# Patient Record
Sex: FEMALE | Race: WHITE | NOT HISPANIC OR LATINO | Employment: OTHER | URBAN - METROPOLITAN AREA
[De-identification: names, ages, dates, MRNs, and addresses within clinical notes are randomized per-mention and may not be internally consistent; named-entity substitution may affect disease eponyms.]

---

## 2017-01-16 ENCOUNTER — THERAPY VISIT (OUTPATIENT)
Dept: OCCUPATIONAL THERAPY | Facility: CLINIC | Age: 57
End: 2017-01-16
Payer: COMMERCIAL

## 2017-01-16 DIAGNOSIS — S52.591D OTHER CLOSED FRACTURE OF DISTAL END OF RIGHT RADIUS WITH ROUTINE HEALING, SUBSEQUENT ENCOUNTER: ICD-10-CM

## 2017-01-16 DIAGNOSIS — M25.531 RIGHT WRIST PAIN: Primary | ICD-10-CM

## 2017-01-16 DIAGNOSIS — M25.631 WRIST STIFFNESS, RIGHT: ICD-10-CM

## 2017-01-16 PROCEDURE — 97022 WHIRLPOOL THERAPY: CPT | Mod: GO | Performed by: OCCUPATIONAL THERAPIST

## 2017-01-16 PROCEDURE — 97110 THERAPEUTIC EXERCISES: CPT | Mod: GO | Performed by: OCCUPATIONAL THERAPIST

## 2017-01-16 PROCEDURE — 97140 MANUAL THERAPY 1/> REGIONS: CPT | Mod: GO | Performed by: OCCUPATIONAL THERAPIST

## 2017-01-16 PROCEDURE — 97035 APP MDLTY 1+ULTRASOUND EA 15: CPT | Mod: GO | Performed by: OCCUPATIONAL THERAPIST

## 2017-01-16 NOTE — PROGRESS NOTES
SOAP Note - Hand Therapy    Current Date:  1/16/17  Referring Physician: Dr. Nicholas Gonzalez ESTUARDO Mayo is a 56 year old right hand dominant female.    Diagnosis:Right Distal Radius Fracture  DOI:  9/10/16  DOS:  9/27/16  Procedure: Open reduction internal fixation right intraarticular distal radius fracture, greater than 3 parts with the use of corticocancellous bone graft.    Post: 15w 6d    Patient reports symptoms of pain, stiffness/loss of motion, weakness/loss of strength and edema of the right wrist and hand  which occurred due to a fall on gravel and landed on right wrist.     S:  Subjective changes as noted by patient:.  I don't feel the wrist has gotten any more motion.  Functional changes noted by patient: I can do most everything now in my everyday activities.    Upper Extremity Functional Index Score:  SCORE:   Column Totals: /80: 67   (A lower score indicates greater disability.)      O:  Pain:    VAS(0-10) 10/3/16 10/19/16 10/26/16 11/9/16 11/16/16 11/23/16 12/7/16 12/12/16 12/26/16 1/16/17   At Rest:  3/10 0/10 2/10 1/10 1/10 1/10 3/10 2/10 2/10 2/10   With Use:  5/10 3/10 3/10 2/10 2/10 2/10 3/10 2/10 2/10 2/10     Report of Pain:  Location: right wrist and thumb  Description: Ache, soreness  Frequency:  Occasional  Duration:  Worse during the day  Exacerbated by:  activity  Relieved by, ice,  rest  Progression:  Gradually improving    ROM:  Right Left Right Right Right Right Right Right Right Right Right Right   Wrist AROM  (PROM) 10/3/16 10/12/16 10/26/16 11/16/16  post 11/23/16  post 11/31/16 12/7/16  post 12/12/16  post 12/26/16 1/16/17   70 Ext -5 5 30 50 55 35 pre  55 post 55 60 45 pre  60 post 65 post   85 Flex 15 15 20 35 45 30 pre   45 post 40 post  50 post US 47 post  55 post US 40 pre  50 post 50 post  55 post us   25 RD  5  20 20 15 pre  23 post 20  20 pre  25 post 25   50 UD  5  20 25 15 pre  30 post 30  20 pre  35 post 35   90 Sup  30 40 65 70 65 pre   70 post 80 75 70 pre  80 post 80  post   85 Pron  20 40 75 80 70 pre  83 post 85 75 70 pre  83 post 85 post     ROM:  Thumb 10/3/16 10/3/16 10/12/16 12/1/16   AROM(PROM) Right Left Right Right   MP / /     IP / /     RAbd / /     PAbd       Retropulsion       Opposition  [facility] (0-10/10) 7/10 10/10 8/10 9/10     Digits: able to make full fist    Strength:   (Measured in pounds) deferred at this time   16   Trials Right Left Right Left Right Left  Right Left Right   1  2  3  Av  14  14  13 45  48  50  47 16  19  18  18  25  23  20  23  22  23  23  23  28  30  28  29     Lat Pinch 16   Trials Right Left Right Left Right Left  Right Left   1  2  3  Av 13 10  10  10      3 Pt.  Pinch 16   Trials Right Left Right Left Right Left  Right Left   1  2  3  Avg: 3 11 6  6  10      Edema (circumference)   Distal Wrist Crease 10/3/16 10/26/16 12/7/16              Right 17.5 17.3 16.6               Left 15.5          Edema:  []     None   [x]     Mild    []     Moderate      []     Severe                  Location: right wrist     Scar:  []    NA           [x]  Adherent      []   Non-adherent       []   Raised     []   Flattened              Palpation:  []     Normal        [x]   Tender       [x]  Mild         []   Moderate []   Severe     Location: radial and ulnar sides of wrist and on and around incision     Recommendations for Continued Therapy  Additions to Treatment Plan -  Therapeutic Exercise:  Isotonics and Stabilization    Please refer to the daily flowsheet for treatment provided today.     Home Program:   Warmth  AROM of wrist and forearm all planes  Forearm based wrist orthosis full time, remove for exercise and hygiene  Avoid activities that exacerbate pain   Scar mobilization  PROM for wrist E/F, UD/RD, and S/P   strengthening  Wrist PREs     Next Visit:   Heat  US  A/PROM of wrist and fingers  Strengthening of  wrist and hand

## 2017-01-30 ENCOUNTER — THERAPY VISIT (OUTPATIENT)
Dept: OCCUPATIONAL THERAPY | Facility: CLINIC | Age: 57
End: 2017-01-30
Payer: COMMERCIAL

## 2017-01-30 DIAGNOSIS — M25.531 RIGHT WRIST PAIN: Primary | ICD-10-CM

## 2017-01-30 DIAGNOSIS — M25.631 WRIST STIFFNESS, RIGHT: ICD-10-CM

## 2017-01-30 DIAGNOSIS — S52.591D OTHER CLOSED FRACTURE OF DISTAL END OF RIGHT RADIUS WITH ROUTINE HEALING, SUBSEQUENT ENCOUNTER: ICD-10-CM

## 2017-01-30 PROCEDURE — 97022 WHIRLPOOL THERAPY: CPT | Mod: GO | Performed by: OCCUPATIONAL THERAPIST

## 2017-01-30 PROCEDURE — 97035 APP MDLTY 1+ULTRASOUND EA 15: CPT | Mod: GO | Performed by: OCCUPATIONAL THERAPIST

## 2017-01-30 PROCEDURE — 97530 THERAPEUTIC ACTIVITIES: CPT | Mod: GO | Performed by: OCCUPATIONAL THERAPIST

## 2017-01-30 PROCEDURE — 97110 THERAPEUTIC EXERCISES: CPT | Mod: GO | Performed by: OCCUPATIONAL THERAPIST

## 2017-01-30 NOTE — PROGRESS NOTES
SOAP Note - Hand Therapy    Current Date:  1/30/17  Referring Physician: Dr. Nicholas MARTE Gael is a 56 year old right hand dominant female.    Diagnosis:Right Distal Radius Fracture  DOI:  9/10/16  DOS:  9/27/16  Procedure: Open reduction internal fixation right intraarticular distal radius fracture, greater than 3 parts with the use of corticocancellous bone graft.    Post: 17w 6d    Patient reports symptoms of pain, stiffness/loss of motion, weakness/loss of strength and edema of the right wrist and hand  which occurred due to a fall on gravel and landed on right wrist.     S:  Subjective changes as noted by patient:.  The wrist is about the same.  I have trouble reaching behind my back to fasten my bra still  Functional changes noted by patient:  The wrist is getting stronger.    Upper Extremity Functional Index Score:  SCORE:   Column Totals: /80: 67   (A lower score indicates greater disability.)      O:  Pain:    VAS(0-10) 10/3/16 10/19/16 10/26/16 11/9/16 11/16/16 11/23/16 12/7/16 12/12/16 12/26/16 1/16/17 1/30/17   At Rest:  3/10 0/10 2/10 1/10 1/10 1/10 3/10 2/10 2/10 2/10 2/10   With Use:  5/10 3/10 3/10 2/10 2/10 2/10 3/10 2/10 2/10 2/10 2/10     Report of Pain:  Location: right wrist and thumb  Description: Ache, soreness  Frequency:  Occasional  Duration:  Worse during the day  Exacerbated by:  activity  Relieved by, ice,  rest  Progression:  unchanged    ROM:  Right Left Right Right Right Right Right Right Right Right Right Right Right   Wrist AROM  (PROM) 10/3/16 10/12/16 10/26/16 11/16/16  post 11/23/16  post 11/31/16 12/7/16  post 12/12/16  post 12/26/16 1/16/17 1/30/17   70 Ext -5 5 30 50 55 35 pre  55 post 55 60 45 pre  60 post 65 post 58 pre  62   85 Flex 15 15 20 35 45 30 pre   45 post 40 post  50 post US 47 post  55 post US 40 pre  50 post 50 post  55 post us 40 pre  50 post  55 post US     25 RD  5  20 20 15 pre  23 post 20  20 pre  25 post 25 25   50 UD  5  20 25 15 pre  30 post 30   20 pre  35 post 35 30 pre  35 post   90 Sup  30 40 65 70 65 pre   70 post 80 75 70 pre  80 post 80 post 65 pre  80 post   85 Pron  20 40 75 80 70 pre  83 post 85 75 70 pre  83 post 85 post 80 pre     ROM:  Thumb 10/3/16 10/3/16 10/12/16 12/1/16   AROM(PROM) Right Left Right Right   MP / /     IP / /     RAbd / /     PAbd       Retropulsion       Opposition  [facility] Opposition Scale (0-10/10) 7/10 10/10 8/10 9/10     Digits: able to make full fist    Strength:   (Measured in pounds) deferred at this time   16   Trials Right Left Right Left Right Left  Right Left Right Right   1  2  3  Av  14  14  13 45  48  50  47 16  19  18  18  25  23  20  23  22  23  23  23  28  30  28  29 36  34  32  34     Lat Pinch 16   Trials Right Left Right Left Right Left  Right Left   1  2  3  Av 13 10  10  10      3 Pt.  Pinch 16   Trials Right Left Right Left Right Left  Right Left   1  2  3  Avg: 3 11 6  6  10      Edema (circumference)   Distal Wrist Crease 10/3/16 10/26/16 12/7/16              Right 17.5 17.3 16.6               Left 15.5          Edema:  []     None   [x]     Mild    []     Moderate      []     Severe                  Location: right wrist     Scar:  []    NA           [x]  Adherent      []   Non-adherent       []   Raised     []   Flattened              Palpation:  []     Normal        [x]   Tender       [x]  Mild         []   Moderate []   Severe     Location: radial and ulnar sides of wrist and on and around incision     Recommendations for Continued Therapy  Additions to Treatment Plan -  Therapeutic Exercise:  Isotonics and Stabilization    Please refer to the daily flowsheet for treatment provided today.     Home Program:   Warmth  AROM of wrist and forearm all planes  Forearm based wrist orthosis full time, remove for exercise and hygiene  Avoid activities that exacerbate pain   Scar  mobilization  PROM for wrist E/F, UD/RD, and S/P   strengthening  Wrist PREs     Next Visit: 2 weeks  Heat  US  A/PROM of wrist and fingers  Strengthening of wrist and hand

## 2017-03-01 ENCOUNTER — THERAPY VISIT (OUTPATIENT)
Dept: OCCUPATIONAL THERAPY | Facility: CLINIC | Age: 57
End: 2017-03-01
Payer: COMMERCIAL

## 2017-03-01 DIAGNOSIS — M25.631 WRIST STIFFNESS, RIGHT: ICD-10-CM

## 2017-03-01 DIAGNOSIS — M25.531 RIGHT WRIST PAIN: ICD-10-CM

## 2017-03-01 DIAGNOSIS — S52.591D OTHER CLOSED FRACTURE OF DISTAL END OF RIGHT RADIUS WITH ROUTINE HEALING, SUBSEQUENT ENCOUNTER: ICD-10-CM

## 2017-03-01 PROCEDURE — 97530 THERAPEUTIC ACTIVITIES: CPT | Mod: GO | Performed by: OCCUPATIONAL THERAPIST

## 2017-03-01 PROCEDURE — 97140 MANUAL THERAPY 1/> REGIONS: CPT | Mod: GO | Performed by: OCCUPATIONAL THERAPIST

## 2017-03-01 PROCEDURE — 97110 THERAPEUTIC EXERCISES: CPT | Mod: GO | Performed by: OCCUPATIONAL THERAPIST

## 2017-03-01 PROCEDURE — 97035 APP MDLTY 1+ULTRASOUND EA 15: CPT | Mod: GO | Performed by: OCCUPATIONAL THERAPIST

## 2017-03-01 NOTE — MR AVS SNAPSHOT
After Visit Summary   3/1/2017    Carlos Mayo    MRN: [id number]           Patient Information     Date Of Birth          1960        Visit Information        Provider Department      3/1/2017 3:00 PM Holly Wills OT [facility]        Today's Diagnoses     Right wrist pain        Wrist stiffness, right        Other closed fracture of distal end of right radius with routine healing, subsequent encounter           Follow-ups after your visit        Your next 10 appointments already scheduled     Mar 13, 2017  9:15 AM CDT   Return Visit with Sammie Peetrson MD   [facility] ([facility]    [address] [place] [address] [address]  [facility] [address] [phone]            Mar 13, 2017 10:00 AM CDT   CRISTOBAL Hand with Holly Wills OT   [facility] ([facility])    [address] [place], [place] [address]  [facility] [address]  [place] [id number] [phone]              Who to contact     If you have questions or need follow up information about today's clinic visit or your schedule please contact [facility] directly at [phone].  Normal or non-critical lab and imaging results will be communicated to you by [employer], letter or phone within 4 business days after the clinic has received the results. If you do not hear from us within 7 days, please contact the clinic through [employer] or phone. If you have a critical or abnormal lab result, we will notify you by phone as soon as possible.  Submit refill requests through [employer] or call your pharmacy and they will forward the refill request to us. Please allow 3 business days for your refill to be completed.          Additional Information About Your Visit        MyChart Information     [employer] gives you secure access to your electronic health record. If you see a primary care provider, you can also send messages to your care team and make appointments. If you have questions, please call  your primary care clinic.  If you do not have a primary care provider, please call [phone] and they will assist you.        Care EveryWhere ID     This is your Care EveryWhere ID. This could be used by other organizations to access your [facility] medical records  [id number]        Your Vitals Were     Last Period                   11/01/2007            Blood Pressure from Last 3 Encounters:   12/12/16 127/80   10/31/16 131/84   10/03/16 130/78    Weight from Last 3 Encounters:   09/27/16 72.8 kg (160 lb 9.6 oz)   09/26/16 72.8 kg (160 lb 9.6 oz)   09/26/16 71.7 kg (158 lb)              We Performed the Following     MANUAL THER TECH,1+REGIONS,EA 15 MIN     THERAPEUTIC ACTIVITIES     THERAPEUTIC EXERCISES     ULTRASOUND THERAPY        Primary Care Provider Office Phone # Fax #    Mayelin Briggs [phone]       [facility] [address] [place] [address]        Thank you!     Thank you for choosing [facility]  for your care. Our goal is always to provide you with excellent care. Hearing back from our patients is one way we can continue to improve our services. Please take a few minutes to complete the written survey that you may receive in the mail after your visit with us. Thank you!             Your Updated Medication List - Protect others around you: Learn how to safely use, store and throw away your medicines at www.disposemymeds.org.          This list is accurate as of: 3/1/17  6:03 PM.  Always use your most recent med list.                   Brand Name Dispense Instructions for use    acetaminophen 325 MG tablet    TYLENOL    100 tablet    Take 2 tablets (650 mg) by mouth every 6 hours       vitamin B complex with vitamin C Tabs tablet      Take 1 tablet by mouth daily       VITAMIN C PO          vitamin D 2000 UNITS Caps

## 2017-03-13 ENCOUNTER — OFFICE VISIT (OUTPATIENT)
Dept: ORTHOPEDICS | Facility: CLINIC | Age: 57
End: 2017-03-13
Payer: COMMERCIAL

## 2017-03-13 ENCOUNTER — THERAPY VISIT (OUTPATIENT)
Dept: OCCUPATIONAL THERAPY | Facility: CLINIC | Age: 57
End: 2017-03-13
Payer: COMMERCIAL

## 2017-03-13 VITALS — DIASTOLIC BLOOD PRESSURE: 62 MMHG | SYSTOLIC BLOOD PRESSURE: 122 MMHG

## 2017-03-13 DIAGNOSIS — M25.531 RIGHT WRIST PAIN: ICD-10-CM

## 2017-03-13 DIAGNOSIS — S52.571D OTHER CLOSED INTRA-ARTICULAR FRACTURE OF DISTAL END OF RIGHT RADIUS WITH ROUTINE HEALING, SUBSEQUENT ENCOUNTER: Primary | ICD-10-CM

## 2017-03-13 DIAGNOSIS — M25.631 WRIST STIFFNESS, RIGHT: ICD-10-CM

## 2017-03-13 DIAGNOSIS — S52.591D OTHER CLOSED FRACTURE OF DISTAL END OF RIGHT RADIUS WITH ROUTINE HEALING, SUBSEQUENT ENCOUNTER: ICD-10-CM

## 2017-03-13 PROCEDURE — 97140 MANUAL THERAPY 1/> REGIONS: CPT | Mod: GO | Performed by: OCCUPATIONAL THERAPIST

## 2017-03-13 PROCEDURE — 97035 APP MDLTY 1+ULTRASOUND EA 15: CPT | Mod: GO | Performed by: OCCUPATIONAL THERAPIST

## 2017-03-13 PROCEDURE — 99213 OFFICE O/P EST LOW 20 MIN: CPT | Performed by: ORTHOPAEDIC SURGERY

## 2017-03-13 PROCEDURE — 97530 THERAPEUTIC ACTIVITIES: CPT | Mod: GO | Performed by: OCCUPATIONAL THERAPIST

## 2017-03-13 PROCEDURE — 97110 THERAPEUTIC EXERCISES: CPT | Mod: GO | Performed by: OCCUPATIONAL THERAPIST

## 2017-03-13 ASSESSMENT — PAIN SCALES - GENERAL: PAINLEVEL: NO PAIN (1)

## 2017-03-13 NOTE — PROGRESS NOTES
Discharge Note - Hand Therapy    Current Date:  3/13/17  Initial Evaluation: 10/3/17  Reporting Period:  3/1/17 to 3/13/17  Number of visits: 16  Referring Physician: Dr. Nicholas Gonzalez ESTUARDO Mayo is a 56 year old right hand dominant female.    Diagnosis:Right Distal Radius Fracture  DOI:  9/10/16  DOS:  9/27/16  Procedure: Open reduction internal fixation right intraarticular distal radius fracture, greater than 3 parts with the use of corticocancellous bone graft.    Post: 5 months     Patient reports symptoms of pain, stiffness/loss of motion, weakness/loss of strength and edema of the right wrist and hand  which occurred due to a fall on gravel and landed on right wrist.     S:  Subjective changes as noted by patient:  I think I can go on my own now.  What do you think?  Functional changes noted by patient: Independent with most functional tasks.  Still has trouble with reaching and applying seat belt.    Upper Extremity Functional Index Score:  SCORE:   Column Totals: /80: 69   (A lower score indicates greater disability.)    O:  Pain:    VAS(0-10) 10/3/16 11/9/16 11/16/16 12/7/16 12/26/16 1/16/17 1/30/17 3/1/17 3/13/17   At Rest:  3/10 1/10 1/10 3/10 2/10 2/10 2/10 0/10 0/10   With Use:  5/10 2/10 2/10 3/10 2/10 2/10 2/10 1/10 1/10     Report of Pain:  Location: right ulnar side of wrist   Description: Ache, soreness  Frequency:  Occasional  Duration:  Worse during the day  Exacerbated by:  activity  Relieved by, ice,  rest  Progression:  improved    ROM:  Right Left Right Right Right Right Right Right Right Right Right Right Right Right Right   Wrist AROM  (PROM) 10/3/16 10/12/16 10/26/16 11/16/16  post 11/23/16  post 11/31/16 12/7/16  post 12/12/16  post 12/26/16 1/16/17 1/30/17 3/1/17 3/13/17   70 Ext -5 5 30 50 55 35 pre  55 post 55 60 45 pre  60 post 65 post 58 pre  62 58 pre  65 post 60 pre  70 post   85 Flex 15 15 20 35 45 30 pre   45 post 40 post  50 post US 47 post  55 post US 40 pre  50 post 50  pre  55 post us 40 pre  50 post  55 post US   45 pre  60 post 50 pre  60 post   25 RD  5  20 20 15 pre  23 post 20  20 pre  25 post 25 25 25 25 pre   50 UD  5  20 25 15 pre  30 post 30  20 pre  35 post 35 30 pre  35 post 38 pre  40 post 30 pre  35 pre   90 Sup  30 40 65 70 65 pre   70 post 80 75 70 pre  80 post 80 post 65 pre  80 post 78 pre  87 post 70 pre  80 post   85 Pron  20 40 75 80 70 pre  83 post 85 75 70 pre  83 post 85 post 80 pre 85 85 pre     ROM:  Thumb 10/3/16 10/3/16 10/12/16 12/1/16   AROM(PROM) Right Left Right Right   MP / /     IP / /     RAbd / /     PAbd       Retropulsion       Opposition  [facility] Opposition Scale (0-10/10) 7/10 10/10 8/10 9/10     Digits: able to make full fist    Strength:   (Measured in pounds) deferred at this time   11/9/16 11/23/16 12/7/16 12/26/16 1/16/17 1/30/16 3/1/17 3/13/17   Trials Right Left Right Left Right Left  Right Left Right Right Right Right   1  2  3  Av  14  14  13 45  48  50  47 16  19  18  18  25  23  20  23  22  23  23  23  28  30  28  29 36  34  32  34 37  38  40  38 42  40  39  40     Lat Pinch 11/9/16 12/7/16 12/26/16 1/16/17 3/1/17   Trials Right Left Right Left Right Left  Right Left Right   1  2  3  Av 13 10  10  10  10     3 Pt.  Pinch 11/9/16 12/7/16 12/26/16 1/16/17 3/1/17   Trials Right Left Right Left Right Left  Right Left Right   1  2  3  Avg: 3 11 6  6  10  10        Scar:  []    NA           [x]  Adherent      []   Non-adherent       []   Raised     []   Flattened              Palpation:  [x]     Normal        []   Tender       []  Mild         []   Moderate []   Severe         Please refer to the daily flowsheet for treatment provided today.     Assessment:  Overall Assessment:  Patient's symptoms are resolving.  Patient is independent in home exercise program.  Patient is ready to be discharged from therapy and continue their home treatment program.  STG/LTG:  See goal sheet for details and  updates.    Plan:  Frequency/Duration:  Hold chart open for one month, if no contact is received from patient, discharge will occur at that time with this note serving as the discharge summary.    Recommendations for Home Program -   Home Program:   Warmth  AROM of wrist and forearm all planes  Forearm based wrist orthosis full time, remove for exercise and hygiene  Avoid activities that exacerbate pain   Scar mobilization  PROM for wrist E/F, UD/RD, and S/P   strengthening  Wrist PREs

## 2017-03-13 NOTE — PATIENT INSTRUCTIONS
Thanks for coming today.  [facility]  [address] [place] [address]    To schedule future appointments in Ortho Clinic, you may call [phone].    To schedule ordered imaging by your provider:   Call Central Imaging [phone]    To schedule an injection ordered by your provider:  Call [facility] Injection scheduling line: [phone]  [facility] available online at:  [email or web address]    Please call if any further questions or concerns ([phone]).  Clinic hours 8 am to 5 pm.    Return to clinic (call) if symptoms worsen or fail to improve.

## 2017-03-13 NOTE — MR AVS SNAPSHOT
After Visit Summary   3/13/2017    Carlos Mayo    MRN: [id number]           Patient Information     Date Of Birth          1960        Visit Information        Provider Department      3/13/2017 9:15 AM Sammie Peterson MD [facility]        Today's Diagnoses     Other closed intra-articular fracture of distal end of right radius with routine healing, subsequent encounter    -  1      Care Instructions    Thanks for coming today.  Or[facility]  [address] [place] [address]    To schedule future appointments in Ortho Clinic, you may call [phone].    To schedule ordered imaging by your provider:   Call [facility] [phone]    To schedule an injection ordered by your provider:  Call [facility] Injection scheduling line: [phone]  [employer] available online at:  [email or web address]    Please call if any further questions or concerns ([phone]).  Clinic hours 8 am to 5 pm.    Return to clinic (call) if symptoms worsen or fail to improve.          Follow-ups after your visit        Your next 10 appointments already scheduled     Aug 07, 2017  9:00 AM CDT   Return Visit with Sammie Peterson MD   [facility] ([facility])    [address] [place] [address] [phone]              Who to contact     If you have questions or need follow up information about today's clinic visit or your schedule please contact [facility] directly at [phone].  Normal or non-critical lab and imaging results will be communicated to you by [employer], letter or phone within 4 business days after the clinic has received the results. If you do not hear from us within 7 days, please contact the clinic through [employer] or phone. If you have a critical or abnormal lab result, we will notify you by phone as soon as possible.  Submit refill requests through [employer]  or call your pharmacy and they will forward the refill request to us. Please allow 3 business days for your refill to be completed.          Additional Information About Your Visit        [employer] Information     [employer] gives you secure access to your electronic health record. If you see a primary care provider, you can also send messages to your care team and make appointments. If you have questions, please call your primary care clinic.  If you do not have a primary care provider, please call [phone] and they will assist you.      [employer] is an electronic gateway that provides easy, online access to your medical records. With [employer] you can request a clinic appointment, read your test results, renew a prescription or communicate with your care team.     To access your existing account, please contact your [facility] or call [phone] for assistance.        Care EveryWhere ID     This is your Care EveryWhere ID. This could be used by other organizations to access your [facility] medical records  [id number]        Your Vitals Were     Last Period                   11/01/2007            Blood Pressure from Last 3 Encounters:   03/13/17 122/62   12/12/16 127/80   10/31/16 131/84    Weight from Last 3 Encounters:   09/27/16 72.8 kg (160 lb 9.6 oz)   09/26/16 72.8 kg (160 lb 9.6 oz)   09/26/16 71.7 kg (158 lb)               Primary Care Provider Office Phone # Fax #    Mayelin Briggs [phone]       [facility] [address] [place] [address]        Thank you!     Thank you for choosing [facility]  for your care. Our goal is always to provide you with excellent care. Hearing back from our patients is one way we can continue to improve our services. Please take a few minutes to complete the written survey that you may receive in the mail after your visit with us. Thank you!             Your Updated Medication List - Protect  others around you: Learn how to safely use, store and throw away your medicines at www.disposemymeds.org.          This list is accurate as of: 3/13/17 11:59 PM.  Always use your most recent med list.                   Brand Name Dispense Instructions for use    acetaminophen 325 MG tablet    TYLENOL    100 tablet    Take 2 tablets (650 mg) by mouth every 6 hours       vitamin B complex with vitamin C Tabs tablet      Take 1 tablet by mouth daily       VITAMIN C PO          vitamin D 2000 UNITS Caps

## 2017-03-13 NOTE — NURSING NOTE
"Carlos Mayo's goals for this visit include: Recheck right distal radius ORIF, sx: 927/16  She requests these members of her care team be copied on today's visit information: yes    PCP: Mayelin Briggs    Referring Provider:  No referring provider defined for this encounter.    Chief Complaint   Patient presents with     RECHECK     Right ORIF distal radius, sx: 9/27/16       Initial /62  LMP 11/01/2007 Estimated body mass index is 26.73 kg/(m^2) as calculated from the following:    Height as of 9/27/16: 1.651 m (5' 5\").    Weight as of 9/27/16: 72.8 kg (160 lb 9.6 oz).  Medication Reconciliation: complete    "

## 2017-03-13 NOTE — PROGRESS NOTES
"Date of Service: Mar 13, 2017  Procedure: Open reduction internal fixation of right intraarticular distal radius fracture, greater than 3 parts, with the use of corticocancellous bone graft    Date of Procedure: Sep 27, 2016    History of Present Illness: Carlos Mayo is a 56 year old, right handed female now 5.5 months status post the above-stated procedure who presents today for routine follow up. She reports that things are going well overall, but does have persisting stiffness with wrist extension. She characterizes a sensation of something inside of her wrist stopping her from full extension of the right wrist. Additionally, she notes some ulnar-sided \"tugging\" with right wrist supination. We had previously discussed the prospect of removing the implants, but we had elected to hold off on doing so until around 1 year status post ORIF. She reports no new injuries. We had also previously obtained TSH, Ca, and Vitamin D levels that were all within normal limits. We had also discussed the prospect of obtaining a DEXA, but she states that she has not yet done this as she has not yet followed up with her primary care provider.     Physical examination:  Vitals: /62  LMP 11/01/2007  Pain is rated 1 out of 10 on the visual analog scale.  QUICKDASH: 9.09   strength: Level 3  are 45 pounds bilaterally.   GENERAL: Healthy-appearing adult female in no acute distress.  Alert and oriented times three.  Respiratory: Breathing regular and non-labored.  Examination of the right upper extremity reveals 75 degrees of supination (compared to 68 degrees 3 months ago). Full pronation.  She has 65 degrees of wrist flexion. She is able to oppose the right thumb to the middle phalanx of the small finger. Finger abduction, EPL and FPL remain intact. The patient's neurovascular exam is intact as well, and the digits are warm and well perfused with capillary refill in less than 2 seconds.   Skin: Intact without any " rashes or abrasions.    Radiographs: 3 views of the right wrist, dated 12/12/16, were reviewed. These demonstrated unchanged, proper alignment of the dorsal plate and subchondral screws and a well healed fracture.     Assessment: 56 year old, right handed female now 5.5 months status post open reduction and internal fixation of a right intra-articular distal radius fracture; doing very well but with some concern for stiffness and soreness secondary to the dorsal implants.     Plan: Mrs. Mayo and I had another discussion about her reassuring progress since her injury and we again discussed the possibility of removing the implants sometime in September. I reassured her that this is by no means a required procedure, but if she has persisting pain or wrist stiffness, then this would be a reasonable next step. I will plan to see her back in August, at which time we will obtain radiographs and reassess the necessity of removing the dorsal plate and screws. We will also order the DEXA scan for her today as well. She is comfortable with the plan and all of her questions were answered.     I, Sammie Peterson MD, have reviewed the above note and agree with the scribe's notation as written.   I, Medhat Argueta, am serving as a scribe to document services personally performed by Sammie Peterson MD, based upon my observations and the provider's statements to me. All documentation has been reviewed by the aforementioned doctor prior to being entered into the official medical record.

## 2017-03-13 NOTE — MR AVS SNAPSHOT
After Visit Summary   3/13/2017    Carlos Mayo    MRN: [id number]           Patient Information     Date Of Birth          1960        Visit Information        Provider Department      3/13/2017 10:00 AM Holly Wills OT [facility]        Today's Diagnoses     Right wrist pain        Wrist stiffness, right        Other closed fracture of distal end of right radius with routine healing, subsequent encounter           Follow-ups after your visit        Your next 10 appointments already scheduled     Aug 07, 2017  9:00 AM CDT   Return Visit with Sammie Peterson MD   [facility]    [address] [place] [id number] [phone]              Future tests that were ordered for you today     Open Future Orders        Priority Expected Expires Ordered    DX Hip/Pelvis/Spine Routine  3/13/2018 3/13/2017            Who to contact     If you have questions or need follow up information about today's clinic visit or your schedule please contact [facility] directly at [phone].  Normal or non-critical lab and imaging results will be communicated to you by [employer], letter or phone within 4 business days after the clinic has received the results. If you do not hear from us within 7 days, please contact the clinic through [employer] or phone. If you have a critical or abnormal lab result, we will notify you by phone as soon as possible.  Submit refill requests through [employer] or call your pharmacy and they will forward the refill request to us. Please allow 3 business days for your refill to be completed.          Additional Information About Your Visit        [employer] Information     [employer] gives you secure access to your electronic health record. If you see a primary care provider, you can also send messages to your care team and make appointments. If you have questions, please call your primary care clinic.   If you do not have a primary care provider, please call [phone] and they will assist you.        Care EveryWhere ID     This is your Care EveryWhere ID. This could be used by other organizations to access your [facility] medical records  [id number]        Your Vitals Were     Last Period                   11/01/2007            Blood Pressure from Last 3 Encounters:   03/13/17 122/62   12/12/16 127/80   10/31/16 131/84    Weight from Last 3 Encounters:   09/27/16 72.8 kg (160 lb 9.6 oz)   09/26/16 72.8 kg (160 lb 9.6 oz)   09/26/16 71.7 kg (158 lb)              We Performed the Following     MANUAL THER TECH,1+REGIONS,EA 15 MIN     THERAPEUTIC ACTIVITIES     THERAPEUTIC EXERCISES     ULTRASOUND THERAPY        Primary Care Provider Office Phone # Fax #    Mayelin Briggs [phone]       [place] [facility] [address] [place] [address]        Thank you!     Thank you for choosing [facility]  for your care. Our goal is always to provide you with excellent care. Hearing back from our patients is one way we can continue to improve our services. Please take a few minutes to complete the written survey that you may receive in the mail after your visit with us. Thank you!             Your Updated Medication List - Protect others around you: Learn how to safely use, store and throw away your medicines at www.disposemymeds.org.          This list is accurate as of: 3/13/17  5:37 PM.  Always use your most recent med list.                   Brand Name Dispense Instructions for use    acetaminophen 325 MG tablet    TYLENOL    100 tablet    Take 2 tablets (650 mg) by mouth every 6 hours       vitamin B complex with vitamin C Tabs tablet      Take 1 tablet by mouth daily       VITAMIN C PO          vitamin D 2000 UNITS Caps

## 2017-04-24 ENCOUNTER — RADIANT APPOINTMENT (OUTPATIENT)
Dept: BONE DENSITY | Facility: CLINIC | Age: 57
End: 2017-04-24
Attending: ORTHOPAEDIC SURGERY
Payer: COMMERCIAL

## 2017-04-24 DIAGNOSIS — S52.571D OTHER CLOSED INTRA-ARTICULAR FRACTURE OF DISTAL END OF RIGHT RADIUS WITH ROUTINE HEALING, SUBSEQUENT ENCOUNTER: ICD-10-CM

## 2017-04-24 PROCEDURE — 77080 DXA BONE DENSITY AXIAL: CPT | Performed by: RADIOLOGY

## 2017-08-03 DIAGNOSIS — S52.571D OTHER INTRAARTICULAR FRACTURE OF LOWER END OF RIGHT RADIUS, SUBSEQUENT ENCOUNTER FOR CLOSED FRACTURE WITH ROUTINE HEALING: Primary | ICD-10-CM

## 2017-08-07 ENCOUNTER — RADIANT APPOINTMENT (OUTPATIENT)
Dept: GENERAL RADIOLOGY | Facility: CLINIC | Age: 57
End: 2017-08-07
Attending: ORTHOPAEDIC SURGERY
Payer: COMMERCIAL

## 2017-08-07 ENCOUNTER — TELEPHONE (OUTPATIENT)
Dept: ORTHOPEDICS | Facility: CLINIC | Age: 57
End: 2017-08-07

## 2017-08-07 ENCOUNTER — OFFICE VISIT (OUTPATIENT)
Dept: ORTHOPEDICS | Facility: CLINIC | Age: 57
End: 2017-08-07
Payer: COMMERCIAL

## 2017-08-07 VITALS — DIASTOLIC BLOOD PRESSURE: 76 MMHG | SYSTOLIC BLOOD PRESSURE: 114 MMHG | HEART RATE: 66 BPM | OXYGEN SATURATION: 98 %

## 2017-08-07 DIAGNOSIS — S52.571D OTHER INTRAARTICULAR FRACTURE OF LOWER END OF RIGHT RADIUS, SUBSEQUENT ENCOUNTER FOR CLOSED FRACTURE WITH ROUTINE HEALING: ICD-10-CM

## 2017-08-07 DIAGNOSIS — S52.571D OTHER INTRAARTICULAR FRACTURE OF LOWER END OF RIGHT RADIUS, SUBSEQUENT ENCOUNTER FOR CLOSED FRACTURE WITH ROUTINE HEALING: Primary | ICD-10-CM

## 2017-08-07 PROCEDURE — 99213 OFFICE O/P EST LOW 20 MIN: CPT | Performed by: ORTHOPAEDIC SURGERY

## 2017-08-07 PROCEDURE — 73110 X-RAY EXAM OF WRIST: CPT | Mod: RT | Performed by: RADIOLOGY

## 2017-08-07 ASSESSMENT — PAIN SCALES - GENERAL: PAINLEVEL: NO PAIN (0)

## 2017-08-07 NOTE — NURSING NOTE
"Carlos Mayo's goals for this visit include: ORIF right distal sx: 9/27/16  She requests these members of her care team be copied on today's visit information: yes    PCP: Mayelin Briggs    Referring Provider:  No referring provider defined for this encounter.    Chief Complaint   Patient presents with     RECHECK       Initial /76  Pulse 66  LMP 11/01/2007  SpO2 98% Estimated body mass index is 26.73 kg/(m^2) as calculated from the following:    Height as of 9/27/16: 1.651 m (5' 5\").    Weight as of 9/27/16: 72.8 kg (160 lb 9.6 oz).  Medication Reconciliation: complete  "

## 2017-08-07 NOTE — PATIENT INSTRUCTIONS
[facility]  [address] [place] [address]  Phone [phone]  Fax [phone]    SURGICAL INFORMATION & INSTRUCTIONS  Dr. Sammie Peterson  Name of Surgery: Right distal radius removal of hardware  Date of Surgery:   A surgery scheduler will contact you within 1 week of your office visit with the surgeon.  If you don't receive a phone call, please call [phone].   Arrival Time:   Time of Surgery:    Please arrive early so that we can prepare you for surgery.  Please note that scheduled times may change.  A nurse will call you before surgery to confirm your instructions.    Location of Surgery:     ?  [facility]  [address] [place] [address]  2nd floor check-in 5  Phone [phone]  Fax [phone]  [email or web address]    Prior to surgery  ?   Call your insurance company   Ask if you need pre-approval for your surgery.  If pre-approval is needed, please call our surgery scheduler for assistance with the pre-approval process.   If you do not have insurance, please let us know.       ?   Schedule an appointment with a Primary Care Provider for a Pre-Op Physical.  This should be done within 30 days of surgery  If you do not have a primary care provider, you may call [facility] at [phone], for an appointment.  Please have your office note and any labs or tests faxed to the facility where you are having surgery. Please be sure to request a copy of your pre-op physical and bring it with you on the day of surgery.      Tell your provider if you have any of the following:   - IF you have a pacemaker or an ICD (implanted cardiac defibrillator). If you do, please bring the ID card with you on the day of surgery  - IF you're a smoker. People who smoke have a higher risk of infection after surgery. Ask your provider how you can quit smoking.  - If you have diabetes, work with your provider to control your blood sugar. If its not  well-controlled, we may need to delay surgery (or you may have problems healing afterward).  - If your surgeon asks you to see your dentist: You'll need to complete any dental work before surgery. Your dentist must send a letter to your surgery center saying it's ok to do the surgery.  ?   Pre-Op Phone Call  You will receive a pre-op phone call 1-3 days before surgery to review your eating and drinking restrictions, review medications, and confirm surgery times.      ?   7-10 days BEFORE surgery  ? Stop taking aspirin, Plavix or aspirin products 10 days before surgery or as directed by your doctor.    ? Stop taking non-steroidal anti-inflammatory medications (naproxen/Aleve, ibuprofen/Advil/Motrin, celecoxib/Celebrex, meloxicam/Mobic) 1 week before surgery or as directed by your surgeon.  This will reduce the risk of bleeding during surgery.    ? Stop taking fish oil (Omega-3-fatty acid) 1 week before surgery.    ? It is OK to take acetaminophen (Tylenol) up until 2 hours prior to surgery.    ? Take prescription medications as directed by your doctor.  Discuss which medications to take or hold prior to your surgery, with your primary care doctor.      ? If you have diabetes, ask your primary care doctor or endocrinologist how you should take your medication(s).  ?   24 hours BEFORE surgery    ? Stop drinking alcohol (beer, wine, liquor) at least 24-hours before and after your surgery.     ?   Evening BEFORE surgery      You may eat a normal meal the night before surgery, but eat nothing after midnight.       Take a shower - to help wash away bacteria (germs) from your skin.  It s normal to have bacteria on your skin and skin protects us from these germs.  When you have surgery, we cut the skin.  Sometimes germs get into the cuts and cause infection (illness caused by germs).  By following the showering instructions and using the special soap, you will lower the number of germs on your skin.  This decreases your  chance of an infection.    o Do not shave within 12 inches of your incision (surgical cut) area for at least 3 days before surgery.  Shaving can make small cuts in the skin.  This puts you at a higher risk of infection.  Items you will need for each shower:     Newly washed towel    4 ounces of one of the recommended soaps    Follow these instructions, the evening before surgery       Wash your hair and body with your regular shampoo and soap. Make sure you rinse the shampoo and soap from your hair and body.      Using clean hands, apply about 2 ounces of soap gently on your skin from the neck to your toes. Use on your groin area last. Do not use this soap on your face or head. If you get any soap in your eyes, ears or mouth, rinse right away.       Repeat step 2. It is very important to let the soap stay on your skin for at least 1 minute.       Rinse well and dry off using a clean towel.  If you feel any tingling, itching or other irritation, rinse right away. It is normal to feel some coolness on the skin after using the antiseptic soap. Your skin may feel a bit dry after the shower, but do not use any lotions, creams or moisturizers. Do not use hair spray or other products in your hair.      Dress in freshly washed clothes or pajamas. Use fresh pillowcases and sheets on your bed.    ?   Day of Surgery    You may drink clear liquids up to 2 hours before surgery or as directed by your surgeon.  Clear liquids include: Water,  Pedialyte, Gatorade, apple juice and liquids you can read through.  Please avoid liquids that are red or orange in color.    Do NOT drink: milk, coffee, liquids that have pulp, orange juice, and lemonade or tomato juice.     Do NOT chew gum, chew tobacco or suck on hard candy.    Take another shower  Follow these instructions:        Wash your hair and body with your regular shampoo and soap. Make sure you rinse the shampoo and soap from your hair and body.      Using clean hands, apply about  2 ounces of soap gently on your skin from the neck to your toes. Use on your groin area last. Do not use this soap on your face or head. If you get any soap in your eyes, ears or mouth, rinse right away.       Repeat step 2. It is very important to let the soap stay on your skin for at least 1 minute.       Rinse well and dry off using a clean towel.  If you feel any tingling, itching or other irritation, rinse right away. It is normal to feel some coolness on the skin after using the antiseptic soap. Your skin may feel a bit dry after the shower, but do not use any lotions, creams or moisturizers. Do not use hair spray or other products in your hair.      Dress in freshly washed clothes.    Do not wear deodorant, cologne, lotion, makeup, nail polish or jewelry to surgery.  ?   If there is any change in your health PRIOR to your surgery, please contact your surgeon's office  Such as a fever, cold, cough, rash, etc     ? Arrange for someone to drive you home after surgery.    will need to be a responsible adult (18 years or older) that will provide transportation to and from surgery and stay in the waiting room during your surgery. You may not drive yourself or take public transportation to and from surgery.    ?   Arrange for someone to stay with you for 24 hours after you go home.   This person must be a responsible adult (18 years or older).    ?   Bring these items to the hospital/surgery center:   ? Insurance card(s)  ? Money for parking and co-pays, if needed  ? A list of all the medications you take, including vitamins, minerals, herbs and over the counter medications.    ? A copy of your Advance Health Care Directive, if you have one.  This tells us what treatment(s) you would or would not want, and who would make health care decisions, if you could no longer speak for yourself.    ? A case for glasses, contact lenses, hearing aids or dentures.   ? Your inhaler or CPAP machine, if you use these at  home    ?   Leave extra cash, jewelry and other valuables at home.       ?   Other information:     Sleep Apnea: Let your nurse know if you have a history of sleep apnea, only if you are having surgery at the [facility].    When you arrive for surgery  When you get to the surgery center/hospital, you will:    Check in. If you are under age 18, you must be with a parent or legal guardian.      Sign consent forms, if you haven t already. These forms state that you know the risks and benefits of surgery. When you sign the forms, you give us permission to do the surgery. Do not sign them unless you understand what will happen during and after your surgery. If you have any questions about your surgery, ask to speak with your doctor before you sign the forms. If you don t understand the answers, ask again.      Receive a copy of the Patient s Bill of Rights. If you do not receive a copy, please ask for one.      Change into hospital clothes. Your belongings will be placed in a bag. We will return them to you after surgery.      Meet with the anesthesia provider. He or she will tell you what kind of anesthesia (medicine) will be used to keep you comfortable during surgery.      Remember: it s okay to remind doctors and nurses to wash their hands before touching you.      In most cases, your surgeon will use a marker to write his or her initials on the surgery site. This ensures that the exact site is operated on.      For safety reasons, we will ask you the same questions many times. For example, we may ask your name and birth date over and over again.      Friends and family can stay with you until it s time for surgery. While you re in surgery, they will be in the waiting area. Please note that cell phones are not allowed in some patient care areas.      If you have questions about what will happen in the operating room, talk to your care team.      You will meet with an anesthesiologist, before your  surgery.  He or she may reference types of anesthesia commonly used for surgeries:     General:  This involves the use of an IV for injection of medication and anesthesia. You are put into a sleep and have a breathing tube to assist you with breathing.    Sedation:  You are asleep, but not so deply that you need a breathing tube.     Local or Regional: a nerve is injected to numb the surgical area.    Spinal: you are numbed from the waist down with medicine injected into your back.    Femoral Nerve Block:  Anesthesia injected into the groin of leg which you are having surgery on.      After surgery  We will move you to a recovery room, where we will watch you closely. If you have any pain or discomfort, tell your nurse. He or she will try to make you comfortable.      If you are staying overnight, we will move you to your hospital room after you are awake.      If you are going home, we will move you to another room. Friends and family may be able to join you. The length of time you spend in recovery depend on the type of medicine you received, your medical condition, the type of surgery you had, or your response to the anesthesia given during your procedure.      When you are discharged from the recovery room, the nurses will review instructions with you and your caregiver.      Please wash your hands every time you touch the wound or change bandages or dressings.      Do not submerge the wound in water.  You may not use a bathtub or hot tub until the wound is closed. The wait time frame is generally 2-3 weeks, but any open area can be a source of incoming bacteria, so it is better to be on the safe side and avoid water submersion until your wound is fully healed.  Keep all dressings clean and dry.       If you had surgery on your arm or leg, elevate it as much as possible to help reduce swelling.      You may put ice on the surgical area for comfort, keeping ice on area for up to 20 minutes then off for 40  minutes.  You may do this the first few days after surgery to help reduce pain and swelling.        Drink at least 8-10 glasses of liquid each day to help your body heal.      Keep your lungs clear by coughing and taking deep breaths every couple hours.  This is especially important the first 48 hours after surgery.      Notify your doctor if you have any of the following:   o Fever of 101 F or higher  o Numbness and/or tingling  o Increased pain, redness or swelling  o Drainage from wound  o Prolonged or uncontrolled bleeding  o Difficulty with movement    Follow-up Appointments, in Clinic  If you don't already have an appointment scheduled, please call to set up an appointment at [phone].    ?   Nurse Only Appointment, For dressing change and wound check.   ?   Post-Op appointment with provider.     ?   Hand therapy appointment [phone]    Dealing with pain  A nurse will check your comfort level often during your stay. He or she will work with you to manage your pain.  It s expected that you will have pain after surgery.  Our goal is to reduce or minimize pain by:     Remember pain is real. There are many ways to control pain. We can help you decide what works best for you.    Ask for pain medicine when you need it.  Don t try to  tough it out --this can make you feel worse. Always take your medicine as ordered.    Medicine doesn t work the same for everyone. If your medicine isn t working, tell your nurse. There may be other medicines or treatments we can try.    Medication Refills.  If you need refills on your pain medication, please call the clinic as soon as possible.  It may take 72-business hours to obtain a refill.  Refills must be picked up at check-in 2, [facility] or mailed to a pharmacy of your choice.      It is expected that you will wean off the pain medications in a timely manner.     Constipation is a common side effect of pain medication, decreased activity and  anesthesia from surgery.  Take a stool softener as prescribed by your doctor at the time of discharge.  You may also use over the counter medications as needed.  Be sure to increase your fiber (fruits and vegetables) and your water intake.      Please call the clinic at [phone], if you experience any problems or have questions.  If you are having an emergency, always call 911 or seek immediate evaluation at the emergency room.    Thank you for selecting the [facility] for your care!  ---------------------------------------------          Thanks for coming today.  [facility]  [address] [place] [address]    To schedule future appointments in Ortho Clinic, you may call [phone].    To schedule ordered imaging by your provider:   Call [facility] [phone]    To schedule an injection ordered by your provider:  Call [facility] Injection scheduling line: [phone]  [email or web address] available online at:  [email or web address]    Please call if any further questions or concerns ([phone]).  Clinic hours 8 am to 5 pm.    Return to clinic (call) if symptoms worsen or fail to improve.

## 2017-08-07 NOTE — MR AVS SNAPSHOT
After Visit Summary   8/7/2017    Carlos Mayo    MRN: [id number]           Patient Information     Date Of Birth          1960        Visit Information        Provider Department      8/7/2017 9:00 AM Sammie Peterson MD [facility]        Care Instructions      [facility]  [address] [place] [address]  Phone [phone]  Fax [phone]    SURGICAL INFORMATION & INSTRUCTIONS  Dr. Sammie Peterson  Name of Surgery: Right distal radius removal of hardware  Date of Surgery:   A surgery scheduler will contact you within 1 week of your office visit with the surgeon.  If you don't receive a phone call, please call [phone].   Arrival Time:   Time of Surgery:    Please arrive early so that we can prepare you for surgery.  Please note that scheduled times may change.  A nurse will call you before surgery to confirm your instructions.    Location of Surgery:     ?  [address] [address]  2nd floor check-in 5  Phone [phone]  Fax [phone]  [email or web address]    Prior to surgery  ?   Call your insurance company   Ask if you need pre-approval for your surgery.  If pre-approval is needed, please call our surgery scheduler for assistance with the pre-approval process.   If you do not have insurance, please let us know.       ?   Schedule an appointment with a Primary Care Provider for a Pre-Op Physical.  This should be done within 30 days of surgery  If you do not have a primary care provider, you may call [facility] at [phone], for an appointment.  Please have your office note and any labs or tests faxed to the facility where you are having surgery. Please be sure to request a copy of your pre-op physical and bring it with you on the day of surgery.      Tell your provider if you have any of the following:   - IF you have a pacemaker or an ICD (implanted cardiac  defibrillator). If you do, please bring the ID card with you on the day of surgery  - IF you're a smoker. People who smoke have a higher risk of infection after surgery. Ask your provider how you can quit smoking.  - If you have diabetes, work with your provider to control your blood sugar. If its not well-controlled, we may need to delay surgery (or you may have problems healing afterward).  - If your surgeon asks you to see your dentist: You'll need to complete any dental work before surgery. Your dentist must send a letter to your surgery center saying it's ok to do the surgery.  ?   Pre-Op Phone Call  You will receive a pre-op phone call 1-3 days before surgery to review your eating and drinking restrictions, review medications, and confirm surgery times.      ?   7-10 days BEFORE surgery  ? Stop taking aspirin, Plavix or aspirin products 10 days before surgery or as directed by your doctor.    ? Stop taking non-steroidal anti-inflammatory medications (naproxen/Aleve, ibuprofen/Advil/Motrin, celecoxib/Celebrex, meloxicam/Mobic) 1 week before surgery or as directed by your surgeon.  This will reduce the risk of bleeding during surgery.    ? Stop taking fish oil (Omega-3-fatty acid) 1 week before surgery.    ? It is OK to take acetaminophen (Tylenol) up until 2 hours prior to surgery.    ? Take prescription medications as directed by your doctor.  Discuss which medications to take or hold prior to your surgery, with your primary care doctor.      ? If you have diabetes, ask your primary care doctor or endocrinologist how you should take your medication(s).  ?   24 hours BEFORE surgery    ? Stop drinking alcohol (beer, wine, liquor) at least 24-hours before and after your surgery.     ?   Evening BEFORE surgery      You may eat a normal meal the night before surgery, but eat nothing after midnight.       Take a shower - to help wash away bacteria (germs) from your skin.  It s normal to have bacteria on your skin  and skin protects us from these germs.  When you have surgery, we cut the skin.  Sometimes germs get into the cuts and cause infection (illness caused by germs).  By following the showering instructions and using the special soap, you will lower the number of germs on your skin.  This decreases your chance of an infection.    o Do not shave within 12 inches of your incision (surgical cut) area for at least 3 days before surgery.  Shaving can make small cuts in the skin.  This puts you at a higher risk of infection.  Items you will need for each shower:     Newly washed towel    4 ounces of one of the recommended soaps    Follow these instructions, the evening before surgery       Wash your hair and body with your regular shampoo and soap. Make sure you rinse the shampoo and soap from your hair and body.      Using clean hands, apply about 2 ounces of soap gently on your skin from the neck to your toes. Use on your groin area last. Do not use this soap on your face or head. If you get any soap in your eyes, ears or mouth, rinse right away.       Repeat step 2. It is very important to let the soap stay on your skin for at least 1 minute.       Rinse well and dry off using a clean towel.  If you feel any tingling, itching or other irritation, rinse right away. It is normal to feel some coolness on the skin after using the antiseptic soap. Your skin may feel a bit dry after the shower, but do not use any lotions, creams or moisturizers. Do not use hair spray or other products in your hair.      Dress in freshly washed clothes or pajamas. Use fresh pillowcases and sheets on your bed.    ?   Day of Surgery    You may drink clear liquids up to 2 hours before surgery or as directed by your surgeon.  Clear liquids include: Water,  Pedialyte, Gatorade, apple juice and liquids you can read through.  Please avoid liquids that are red or orange in color.    Do NOT drink: milk, coffee, liquids that have pulp, orange juice, and  lemonade or tomato juice.     Do NOT chew gum, chew tobacco or suck on hard candy.    Take another shower  Follow these instructions:        Wash your hair and body with your regular shampoo and soap. Make sure you rinse the shampoo and soap from your hair and body.      Using clean hands, apply about 2 ounces of soap gently on your skin from the neck to your toes. Use on your groin area last. Do not use this soap on your face or head. If you get any soap in your eyes, ears or mouth, rinse right away.       Repeat step 2. It is very important to let the soap stay on your skin for at least 1 minute.       Rinse well and dry off using a clean towel.  If you feel any tingling, itching or other irritation, rinse right away. It is normal to feel some coolness on the skin after using the antiseptic soap. Your skin may feel a bit dry after the shower, but do not use any lotions, creams or moisturizers. Do not use hair spray or other products in your hair.      Dress in freshly washed clothes.    Do not wear deodorant, cologne, lotion, makeup, nail polish or jewelry to surgery.  ?   If there is any change in your health PRIOR to your surgery, please contact your surgeon's office  Such as a fever, cold, cough, rash, etc     ? Arrange for someone to drive you home after surgery.    will need to be a responsible adult (18 years or older) that will provide transportation to and from surgery and stay in the waiting room during your surgery. You may not drive yourself or take public transportation to and from surgery.    ?   Arrange for someone to stay with you for 24 hours after you go home.   This person must be a responsible adult (18 years or older).    ?   Bring these items to the hospital/surgery center:   ? Insurance card(s)  ? Money for parking and co-pays, if needed  ? A list of all the medications you take, including vitamins, minerals, herbs and over the counter medications.    ? A copy of your Advance Health  Care Directive, if you have one.  This tells us what treatment(s) you would or would not want, and who would make health care decisions, if you could no longer speak for yourself.    ? A case for glasses, contact lenses, hearing aids or dentures.   ? Your inhaler or CPAP machine, if you use these at home    ?   Leave extra cash, jewelry and other valuables at home.       ?   Other information:     Sleep Apnea: Let your nurse know if you have a history of sleep apnea, only if you are having surgery at the [facility].    When you arrive for surgery  When you get to the surgery center/hospital, you will:    Check in. If you are under age 18, you must be with a parent or legal guardian.      Sign consent forms, if you haven t already. These forms state that you know the risks and benefits of surgery. When you sign the forms, you give us permission to do the surgery. Do not sign them unless you understand what will happen during and after your surgery. If you have any questions about your surgery, ask to speak with your doctor before you sign the forms. If you don t understand the answers, ask again.      Receive a copy of the Patient s Bill of Rights. If you do not receive a copy, please ask for one.      Change into hospital clothes. Your belongings will be placed in a bag. We will return them to you after surgery.      Meet with the anesthesia provider. He or she will tell you what kind of anesthesia (medicine) will be used to keep you comfortable during surgery.      Remember: it s okay to remind doctors and nurses to wash their hands before touching you.      In most cases, your surgeon will use a marker to write his or her initials on the surgery site. This ensures that the exact site is operated on.      For safety reasons, we will ask you the same questions many times. For example, we may ask your name and birth date over and over again.      Friends and family can stay with you until it s time  for surgery. While you re in surgery, they will be in the waiting area. Please note that cell phones are not allowed in some patient care areas.      If you have questions about what will happen in the operating room, talk to your care team.      You will meet with an anesthesiologist, before your surgery.  He or she may reference types of anesthesia commonly used for surgeries:     General:  This involves the use of an IV for injection of medication and anesthesia. You are put into a sleep and have a breathing tube to assist you with breathing.    Sedation:  You are asleep, but not so deply that you need a breathing tube.     Local or Regional: a nerve is injected to numb the surgical area.    Spinal: you are numbed from the waist down with medicine injected into your back.    Femoral Nerve Block:  Anesthesia injected into the groin of leg which you are having surgery on.      After surgery  We will move you to a recovery room, where we will watch you closely. If you have any pain or discomfort, tell your nurse. He or she will try to make you comfortable.      If you are staying overnight, we will move you to your hospital room after you are awake.      If you are going home, we will move you to another room. Friends and family may be able to join you. The length of time you spend in recovery depend on the type of medicine you received, your medical condition, the type of surgery you had, or your response to the anesthesia given during your procedure.      When you are discharged from the recovery room, the nurses will review instructions with you and your caregiver.      Please wash your hands every time you touch the wound or change bandages or dressings.      Do not submerge the wound in water.  You may not use a bathtub or hot tub until the wound is closed. The wait time frame is generally 2-3 weeks, but any open area can be a source of incoming bacteria, so it is better to be on the safe side and avoid water  submersion until your wound is fully healed.  Keep all dressings clean and dry.       If you had surgery on your arm or leg, elevate it as much as possible to help reduce swelling.      You may put ice on the surgical area for comfort, keeping ice on area for up to 20 minutes then off for 40 minutes.  You may do this the first few days after surgery to help reduce pain and swelling.        Drink at least 8-10 glasses of liquid each day to help your body heal.      Keep your lungs clear by coughing and taking deep breaths every couple hours.  This is especially important the first 48 hours after surgery.      Notify your doctor if you have any of the following:   o Fever of 101 F or higher  o Numbness and/or tingling  o Increased pain, redness or swelling  o Drainage from wound  o Prolonged or uncontrolled bleeding  o Difficulty with movement    Follow-up Appointments, in Clinic  If you don't already have an appointment scheduled, please call to set up an appointment at [phone].    ?   Nurse Only Appointment, For dressing change and wound check.   ?   Post-Op appointment with provider.     ?   Hand therapy appointment [phone]    Dealing with pain  A nurse will check your comfort level often during your stay. He or she will work with you to manage your pain.  It s expected that you will have pain after surgery.  Our goal is to reduce or minimize pain by:     Remember pain is real. There are many ways to control pain. We can help you decide what works best for you.    Ask for pain medicine when you need it.  Don t try to  tough it out --this can make you feel worse. Always take your medicine as ordered.    Medicine doesn t work the same for everyone. If your medicine isn t working, tell your nurse. There may be other medicines or treatments we can try.    Medication Refills.  If you need refills on your pain medication, please call the clinic as soon as possible.  It may take 72-business hours to obtain a  refill.  Refills must be picked up at check-in 2, [facility] or mailed to a pharmacy of your choice.      It is expected that you will wean off the pain medications in a timely manner.     Constipation is a common side effect of pain medication, decreased activity and anesthesia from surgery.  Take a stool softener as prescribed by your doctor at the time of discharge.  You may also use over the counter medications as needed.  Be sure to increase your fiber (fruits and vegetables) and your water intake.      Please call the clinic at [phone], if you experience any problems or have questions.  If you are having an emergency, always call 911 or seek immediate evaluation at the emergency room.    Thank you for selecting the [facility] for your care!  ---------------------------------------------          Thanks for coming today.  Ortho[facility] Medicine Clinic  [address] [place] [address]    To schedule future appointments in [facility], you may call [phone].    To schedule ordered imaging by your provider:   Call Central Imaging [phone]    To schedule an injection ordered by your provider:  Call Central Imaging Injection scheduling line: [phone]  MyChart available online at:  [email or web address]    Please call if any further questions or concerns ([phone]).  Clinic hours 8 am to 5 pm.    Return to clinic (call) if symptoms worsen or fail to improve.            Follow-ups after your visit        Who to contact     If you have questions or need follow up information about today's clinic visit or your schedule please contact [facility] directly at [phone].  Normal or non-critical lab and imaging results will be communicated to you by MyChart, letter or phone within 4 business days after the clinic has received the results. If you do not hear from us within 7 days, please contact the clinic through  [employer] or phone. If you have a critical or abnormal lab result, we will notify you by phone as soon as possible.  Submit refill requests through [employer] or call your pharmacy and they will forward the refill request to us. Please allow 3 business days for your refill to be completed.          Additional Information About Your Visit        [employer] Information     [employer] gives you secure access to your electronic health record. If you see a primary care provider, you can also send messages to your care team and make appointments. If you have questions, please call your primary care clinic.  If you do not have a primary care provider, please call [phone] and they will assist you.      [employer] is an electronic gateway that provides easy, online access to your medical records. With [employer], you can request a clinic appointment, read your test results, renew a prescription or communicate with your care team.     To access your existing account, please contact your [facility] or call [phone] for assistance.        Care EveryWhere ID     This is your Care EveryWhere ID. This could be used by other organizations to access your [facility] medical records  [id number]        Your Vitals Were     Pulse Last Period Pulse Oximetry             66 11/01/2007 98%          Blood Pressure from Last 3 Encounters:   08/07/17 114/76   03/13/17 122/62   12/12/16 127/80    Weight from Last 3 Encounters:   09/27/16 72.8 kg (160 lb 9.6 oz)   09/26/16 72.8 kg (160 lb 9.6 oz)   09/26/16 71.7 kg (158 lb)              Today, you had the following     No orders found for display       Primary Care Provider Office Phone # Fax #    Mayelin Briggs [phone]       [facility] [place] [address] [place] [address]        Equal Access to Services     ANTOINE ANDRADE AH: Michelle Mendoza, renetta blakely, qabeth jeff, prakash ramirez  ah. So [facility] [phone].    ATENCIÓN: Si taiwola francia, tiene a gonzalez disposición servicios gratuitos de asistencia lingüística. Billy al [phone].    We comply with applicable federal civil rights laws and [place] laws. We do not discriminate on the basis of race, color, national origin, age, disability sex, sexual orientation or gender identity.            Thank you!     Thank you for choosing [facility]  for your care. Our goal is always to provide you with excellent care. Hearing back from our patients is one way we can continue to improve our services. Please take a few minutes to complete the written survey that you may receive in the mail after your visit with us. Thank you!             Your Updated Medication List - Protect others around you: Learn how to safely use, store and throw away your medicines at www.disposemymeds.org.          This list is accurate as of: 8/7/17  9:13 AM.  Always use your most recent med list.                   Brand Name Dispense Instructions for use Diagnosis    acetaminophen 325 MG tablet    TYLENOL    100 tablet    Take 2 tablets (650 mg) by mouth every 6 hours    Other closed intra-articular fracture of distal end of right radius with routine healing, subsequent encounter       vitamin B complex with vitamin C Tabs tablet      Take 1 tablet by mouth daily        VITAMIN C PO           vitamin D 2000 UNITS Caps

## 2017-08-07 NOTE — TELEPHONE ENCOUNTER
Date Scheduled: 9-14-17  Facility: [facility]  Surgeon: Dr. Peterson   Post-op appointment scheduled:   Next 5 appointments (look out 90 days)     Sep 25, 2017  8:00 AM CDT   Return Visit with Sammie Peterson MD   [facility] ([facility])    [address] [place] [id number] [phone]                   scheduled?: No  Surgery packet/instructions confirmed received?  Yes  Special Considerations:   Suad Ferrara, Surgery Scheduling Coordinator

## 2017-08-07 NOTE — TELEPHONE ENCOUNTER
Procedure: right distal radius removal of hardware  Facility: [facility]  Length: 90minute(s)  Surgeon: Nicholas RHODES  Anesthesia: Regional  BMI: There is no height or weight on file to calculate BMI. (If over 43 for general or 45 for MAC cannot be scheduled at [facility])   Pre-op Appointments needed: H&P within 30 days  Post-op appointments needed:2 weeks   provider only   needed:  No  Surgery packet/instructions given to patient?  Yes   Special Considerations: none  Maria E Dumont RN

## 2017-08-21 NOTE — PROGRESS NOTES
[facility]  [address] [address]  [phone]  Dept: [phone]    PRE-OP EVALUATION:  Today's date: 2017    Carlos Mayo (: 1960) presents for pre-operative evaluation assessment as requested by Dr. Peterson.  She requires evaluation and anesthesia risk assessment prior to undergoing surgery/procedure for treatment of right wrist .  Proposed procedure: REMOVE HARDWARE WRIST    Date of Surgery/ Procedure: 2017  Time of Surgery/ Procedure: 10:30AM  Hospital/Surgical Facility: [facility]  Primary Physician: Mayelin Briggs  Type of Anesthesia Anticipated: Regional    Patient has a Health Care Directive or Living Will:  NO    1. NO - Do you have a history of heart attack, stroke, stent, bypass or surgery on an artery in the head, neck, heart or legs?  2. NO - Do you ever have any pain or discomfort in your chest?  3. NO - Do you have a history of  Heart Failure?  4. YES - Are you troubled by shortness of breath when: walking on the level, up a slight hill or at night?  5. NO - Do you currently have a cold, bronchitis or other respiratory infection?  6. NO - Do you have a cough, shortness of breath or wheezing?  7. NO - Do you sometimes get pains in the calves of your legs when you walk?  8. YES - Do you or anyone in your family have previous history of blood clots? Father  9. NO - Do you or does anyone in your family have a serious bleeding problem such as prolonged bleeding following surgeries or cuts?  10. NO - Have you ever had problems with anemia or been told to take iron pills?  11. NO - Have you had any abnormal blood loss such as black, tarry or bloody stools, or abnormal vaginal bleeding?  12. NO - Have you ever had a blood transfusion?  13. YES - Have you or any of your relatives ever had problems with anesthesia? Father  14. NO - Do you have sleep apnea, excessive snoring or daytime drowsiness?  15. NO - Do you have any prosthetic  heart valves?  16. NO - Do you have prosthetic joints?  17. NO - Is there any chance that you may be pregnant?        HPI:                                                      Brief HPI related to upcoming procedure: Patient is a history of a right wrist fracture approximately one year ago. Open reduction internal fixation was done. Overall, she has done well, but notes some pain in the right wrist from the surgical hardware placed at the time of the repair. She comes in today for preop evaluation for removal of the surgical hardware.      See problem list for active medical problems.  Problems all longstanding and stable, except as noted/documented.  See ROS for pertinent symptoms related to these conditions.                                                                                                  .    MEDICAL HISTORY:                                                      Patient Active Problem List    Diagnosis Date Noted     Advanced directives, counseling/discussion 05/24/2016     Priority: Medium     Advance Care Planning 5/24/2016: Information given             24 hr info given 03/26/2012     Priority: Medium     EMERGENCY CARE PLAN  Presenting Problem Signs and Symptoms Treatment Plan    Questions or conerns during clinic hours    I will call the clinic directly     Questions or conerns outside clinic hours    I will call the 24 hour nurse line at [phone]    Patient needs to schedule an appointment    I will call the 24 hour scheduling team at [phone] or clinic directly    Same day treatment     I will call the clinic first, nurse line if after hours, urgent care and express care if needed                                 CARDIOVASCULAR SCREENING; LDL GOAL LESS THAN 160 10/31/2010     Priority: Medium     Cervical stenosis (uterine cervix) 09/09/2009     Priority: Medium     PALPITATIONS - rare 08/16/2001     Priority: Medium      In 2001, 2005 had palpitations w/u negative with stress test,  event monitor, holter, labs all normal.    Thought due to anxiety.          Past Medical History:   Diagnosis Date     Esophageal reflux     Resolved     Palpitations     Holter monitor cleared per patient.      Unspecified acute reaction to stress      Past Surgical History:   Procedure Laterality Date     OPEN REDUCTION INTERNAL FIXATION WRIST Right 9/27/2016    Procedure: OPEN REDUCTION INTERNAL FIXATION WRIST;  Surgeon: Sammie Peterson MD;  Location:  OR     SURGICAL HISTORY OF -   1980    Extraction of [place] teeth     Current Outpatient Prescriptions   Medication Sig Dispense Refill     acetaminophen (TYLENOL) 325 MG tablet Take 2 tablets (650 mg) by mouth every 6 hours 100 tablet 0     Cholecalciferol (VITAMIN D) 2000 UNITS CAPS        Ascorbic Acid (VITAMIN C PO)        vitamin  B complex with vitamin C (VITAMIN  B COMPLEX) TABS Take 1 tablet by mouth daily       OTC products: None, except as noted above    No Known Allergies   Latex Allergy: NO    Social History   Substance Use Topics     Smoking status: Never Smoker     Smokeless tobacco: Not on file     Alcohol use 1.0 oz/week      Comment: 2-3 times a month     History   Drug Use No       REVIEW OF SYSTEMS:                                                    Constitutional, HEENT, cardiovascular, pulmonary, gi and gu systems are negative, except as otherwise noted.      EXAM:                                                    [facility] 11/01/2007    GENERAL APPEARANCE: healthy, alert and no distress     EYES: EOMI, PERRL     HENT: ear canals and TM's normal and nose and mouth without ulcers or lesions     RESP: lungs clear to auscultation - no rales, rhonchi or wheezes     CV: regular rates and rhythm     ABDOMEN:  soft, nontender     SKIN: no suspicious lesions or rashes     NEURO: Normal strength and tone, sensory exam grossly normal, mentation intact and speech normal     PSYCH: mentation appears normal. and affect normal/bright     LYMPHATICS: No  axillary, cervical, or supraclavicular nodes    DIAGNOSTICS:                                                    EKG: Sinus rhythm, incomplete right bundle branch. No acute ischemic changes noted.    Recent Labs   Lab Test  02/09/15   0802  11/22/11   0830  06/23/09   0913   HGB   --   13.2  13.0   NA  139   --   143   POTASSIUM  4.5   --   5.0   CR  0.78   --   0.76      Results for orders placed or performed in visit on 08/28/17   Basic metabolic panel  (Ca, Cl, CO2, Creat, Gluc, K, Na, BUN)   Result Value Ref Range    Sodium 137 133 - 144 mmol/L    Potassium 3.8 3.4 - 5.3 mmol/L    Chloride 103 94 - 109 mmol/L    Carbon Dioxide 28 20 - 32 mmol/L    Anion Gap 6 3 - 14 mmol/L    Glucose 110 (H) 70 - 99 mg/dL    Urea Nitrogen 12 7 - 30 mg/dL    Creatinine 0.72 0.52 - 1.04 mg/dL    GFR Estimate 83 >60 mL/min/1.7m2    GFR Estimate If Black >90 >60 mL/min/1.7m2    Calcium 9.2 8.5 - 10.1 mg/dL   CBC with platelets   Result Value Ref Range    WBC 10.2 4.0 - 11.0 10e9/L    RBC Count 4.30 3.8 - 5.2 10e12/L    Hemoglobin 12.6 11.7 - 15.7 g/dL    Hematocrit 40.5 35.0 - 47.0 %    MCV 94 78 - 100 fl    MCH 29.3 26.5 - 33.0 pg    MCHC 31.1 (L) 31.5 - 36.5 g/dL    RDW 13.4 10.0 - 15.0 %    Platelet Count 243 150 - 450 10e9/L      IMPRESSION:                                                        The proposed surgical procedure is considered INTERMEDIATE risk.    REVISED CARDIAC RISK INDEX  The patient has the following serious cardiovascular risks for perioperative complications such as (MI, PE, VFib and 3  AV Block):  No serious cardiac risks  INTERPRETATION: 1 risks: Class II (low risk - 0.9% complication rate)    The patient has the following additional risks for perioperative complications:  No identified additional risks    (Z01.818) Preop general physical exam  (primary encounter diagnosis)  Plan: EKG 12-lead complete w/read - Clinics, Basic         metabolic panel  (Ca, Cl, CO2, Creat, Gluc, K,         Na, BUN), CBC with  platelets      (Z00.00) Routine general medical examination at a [facility] facility      (S62.101S) Fracture of wrist, right, sequela          RECOMMENDATIONS:                                                        Advised the patient to discontinue supplements prior to surgery.    The incomplete right bundle branch seen on EKG should not impact the surgery.    Anticoagulant or Antiplatelet Medication Use  NSAIDS: Ibuprofen (Motrin):         Stop one day prior to surgery      APPROVAL GIVEN to proceed with proposed procedure, without further diagnostic evaluation       Signed Electronically by: Mayelin Briggs MD    Copy of this evaluation report is provided to requesting physician.    [facility] Preop Guidelines

## 2017-08-21 NOTE — PROGRESS NOTES
SUBJECTIVE:   CC: Carlos Mayo is an 56 year old woman who presents for preventive health visit.     Healthy Habits:    Do you get at least three servings of calcium containing foods daily (dairy, green leafy vegetables, etc.)? yes    Amount of exercise or daily activities, outside of work: 2-3 day(s) per week    Problems taking medications regularly No    Medication side effects: No    Have you had an eye exam in the past two years? yes    Do you see a dentist twice per year? yes    Do you have sleep apnea, excessive snoring or daytime drowsiness?yes, drowsiness    - Ongoing right shoulder pain since fall last year.    - FMHx of stroke - grandfather early 60'  s; CHF grandfather in 80's    Today's PHQ-2 Score:   PHQ-2 ( 1999 [employer]) 5/24/2016 2/12/2015   Q1: Little interest or pleasure in doing things 0 0   Q2: Feeling down, depressed or hopeless 0 0   PHQ-2 Score 0 0   Q1: Little interest or pleasure in doing things - -   Q2: Feeling down, depressed or hopeless - -   PHQ-2 Score - -         Abuse: Current or Past(Physical, Sexual or Emotional)- No  Do you feel safe in your environment - Yes  Social History   Substance Use Topics     Smoking status: Never Smoker     Smokeless tobacco: Not on file     Alcohol use 1.0 oz/week      Comment: 2-3 times a month     The patient does not drink >3 drinks per day nor >7 drinks per week.    Reviewed orders with patient.  Reviewed health maintenance and updated orders accordingly - Yes      Patient over age 50, mutual decision to screen reflected in health maintenance.      Pertinent mammograms are reviewed under the imaging tab.  History of abnormal Pap smear:   NO - age 30- 65 PAP every 3 years recommended  Last 3 Pap Results:   PAP (no units)   Date Value   02/12/2015 NIL   11/22/2011 NIL   10/04/2010 ASC-US (A)       Reviewed and updated as needed this visit by clinical staff         Reviewed and updated as needed this visit by Provider          ROS:  C: NEGATIVE  "for fever, chills, change in weight  I: NEGATIVE for worrisome rashes, moles or lesions  E: NEGATIVE for vision changes or irritation  ENT: NEGATIVE for ear, mouth and throat problems  R: NEGATIVE for significant cough or SOB  B: NEGATIVE for masses, tenderness or discharge  CV: NEGATIVE for chest pain, palpitations or peripheral edema  GI: NEGATIVE for nausea, abdominal pain, heartburn, or change in bowel habits  : NEGATIVE for unusual urinary or vaginal symptoms. No vaginal bleeding.  M: POSITIVE for right shoulder pain. NEGATIVE for other significant arthralgias or myalgia  N: NEGATIVE for weakness, dizziness or paresthesias  P: NEGATIVE for changes in mood or affect     This document serves as a record of the services and decisions personally performed and made by Mayelin Briggs MD. It was created on his behalf by Franco Shah, a trained medical scribe. The creation of this document is based the provider's statements to the medical scribe.  Franco Shah 4:29 PM August 28, 2017  OBJECTIVE:   /64  Pulse 62  Ht 5' 5\" (1.651 m)  Wt 160 lb 2 oz (72.6 kg)  LMP 11/01/2007  BMI 26.65 kg/m2       EXAM:  GENERAL APPEARANCE: healthy, alert and no distress  EYES: Eyes grossly normal to inspection, conjunctivae and sclerae normal  HENT: ear canals and TM's normal, nose and mouth without ulcers or lesions  RESP: lungs clear to auscultation - no rales, rhonchi or wheezes  CV: regular rate and rhythm, normal S1 S2, no murmur  MS: no musculoskeletal defects are noted and gait is age appropriate without ataxia  SKIN: no suspicious lesions or rashes  NEURO: Normal strength and tone, sensory exam grossly normal, mentation intact and speech normal  PSYCH: mentation appears normal and affect normal/bright      ASSESSMENT/PLAN:   (Z00.00) Routine general medical examination at a health care facility  (primary encounter diagnosis)  Comment: Reviewed lifestyle, current conditions, medications, routine screenings, " "labs.  Plan: Annual physical in 1 year, sooner for other health maintenance.     (Z01.818) Preop general physical exam  Comment: EKG was normal. Renal function and CBC results pending.   Plan: EKG 12-lead complete w/read - Clinics, Basic         metabolic panel  (Ca, Cl, CO2, Creat, Gluc, K,         Na, BUN), CBC with platelets          Patient Instructions     Preventive Health Recommendations  Female Ages 50 - 64        *   Call about setting up a mammogram: [phone].       COUNSELING:   Reviewed preventive health counseling, as reflected in patient instructions       Regular exercise       Healthy diet/nutrition         reports that she has never smoked. She does not have any smokeless tobacco history on file.    Estimated body mass index is 26.73 kg/(m^2) as calculated from the following:    Height as of 9/27/16: 5' 5\" (1.651 m).    Weight as of 9/27/16: 160 lb 9.6 oz (72.8 kg).     Patient will follow up in 12 months or sooner, PRN. Patient instructed to call with any questions or concerns.      Counseling Resources:  ATP IV Guidelines  Pooled Cohorts Equation Calculator  Breast Cancer Risk Calculator  FRAX Risk Assessment  ICSI Preventive Guidelines  Dietary Guidelines for Americans, 2010  USDA's MyPlate  ASA Prophylaxis  Lung CA Screening  The information in this document, created by a scribe for me, accurately reflects the services I personally performed and the decisions made by me. I have reviewed and approved this document for accuracy. 4:29 PM 8/28/2017    Mayelin Briggs MD  [facility]      "

## 2017-08-21 NOTE — PATIENT INSTRUCTIONS
Preventive Health Recommendations  Female Ages 50 - 64        *   Call about setting up a mammogram: [phone].       Yearly exam: See your health care provider every year in order to  o Review health changes.   o Discuss preventive care.    o Review your medicines if your doctor has prescribed any.      Get a Pap test every three years (unless you have an abnormal result and your provider advises testing more often).    If you get Pap tests with HPV test, you only need to test every 5 years, unless you have an abnormal result.     You do not need a Pap test if your uterus was removed (hysterectomy) and you have not had cancer.    You should be tested each year for STDs (sexually transmitted diseases) if you're at risk.     Have a mammogram every 1 to 2 years.    Have a colonoscopy at age 50, or have a yearly FIT test (stool test). These exams screen for colon cancer.      Have a cholesterol test every 5 years, or more often if advised.    Have a diabetes test (fasting glucose) every three years. If you are at risk for diabetes, you should have this test more often.     If you are at risk for osteoporosis (brittle bone disease), think about having a bone density scan (DEXA).    Shots: Get a flu shot each year. Get a tetanus shot every 10 years.    Nutrition:     Eat at least 5 servings of fruits and vegetables each day.    Eat whole-grain bread, whole-wheat pasta and brown rice instead of white grains and rice.    Talk to your provider about Calcium and Vitamin D.     Lifestyle    Exercise at least 150 minutes a week (30 minutes a day, 5 days a week). This will help you control your weight and prevent disease.    Limit alcohol to one drink per day.    No smoking.     Wear sunscreen to prevent skin cancer.     See your dentist every six months for an exam and cleaning.    See your eye doctor every 1 to 2 years.    Before Your Surgery      Call your surgeon if there is any change in your health. This includes  signs of a cold or flu (such as a sore throat, runny nose, cough, rash or fever).    Do not smoke, drink alcohol or take over the counter medicine (unless your surgeon or primary care doctor tells you to) for the 24 hours before and after surgery.    If you take prescribed drugs: Follow your doctor s orders about which medicines to take and which to stop until after surgery.    Eating and drinking prior to surgery: follow the instructions from your surgeon    Take a shower or bath the night before surgery. Use the soap your surgeon gave you to gently clean your skin. If you do not have soap from your surgeon, use your regular soap. Do not shave or scrub the surgery site.  Wear clean pajamas and have clean sheets on your bed.

## 2017-08-28 ENCOUNTER — OFFICE VISIT (OUTPATIENT)
Dept: FAMILY MEDICINE | Facility: CLINIC | Age: 57
End: 2017-08-28
Payer: COMMERCIAL

## 2017-08-28 VITALS
BODY MASS INDEX: 26.68 KG/M2 | HEIGHT: 65 IN | HEART RATE: 62 BPM | DIASTOLIC BLOOD PRESSURE: 64 MMHG | SYSTOLIC BLOOD PRESSURE: 104 MMHG | WEIGHT: 160.13 LBS

## 2017-08-28 DIAGNOSIS — S62.101S FRACTURE OF WRIST, RIGHT, SEQUELA: ICD-10-CM

## 2017-08-28 DIAGNOSIS — Z00.00 ROUTINE GENERAL MEDICAL EXAMINATION AT A HEALTH CARE FACILITY: ICD-10-CM

## 2017-08-28 DIAGNOSIS — Z01.818 PREOP GENERAL PHYSICAL EXAM: Primary | ICD-10-CM

## 2017-08-28 LAB
ERYTHROCYTE [DISTWIDTH] IN BLOOD BY AUTOMATED COUNT: 13.4 % (ref 10–15)
HCT VFR BLD AUTO: 40.5 % (ref 35–47)
HGB BLD-MCNC: 12.6 G/DL (ref 11.7–15.7)
MCH RBC QN AUTO: 29.3 PG (ref 26.5–33)
MCHC RBC AUTO-ENTMCNC: 31.1 G/DL (ref 31.5–36.5)
MCV RBC AUTO: 94 FL (ref 78–100)
PLATELET # BLD AUTO: 243 10E9/L (ref 150–450)
RBC # BLD AUTO: 4.3 10E12/L (ref 3.8–5.2)
WBC # BLD AUTO: 10.2 10E9/L (ref 4–11)

## 2017-08-28 PROCEDURE — 80048 BASIC METABOLIC PNL TOTAL CA: CPT | Performed by: FAMILY MEDICINE

## 2017-08-28 PROCEDURE — 93000 ELECTROCARDIOGRAM COMPLETE: CPT | Performed by: FAMILY MEDICINE

## 2017-08-28 PROCEDURE — 36415 COLL VENOUS BLD VENIPUNCTURE: CPT | Performed by: FAMILY MEDICINE

## 2017-08-28 PROCEDURE — 99213 OFFICE O/P EST LOW 20 MIN: CPT | Mod: 25 | Performed by: FAMILY MEDICINE

## 2017-08-28 PROCEDURE — 99396 PREV VISIT EST AGE 40-64: CPT | Performed by: FAMILY MEDICINE

## 2017-08-28 PROCEDURE — 85027 COMPLETE CBC AUTOMATED: CPT | Performed by: FAMILY MEDICINE

## 2017-08-28 NOTE — NURSING NOTE
"Chief Complaint   Patient presents with     Physical     Pre-Op Exam       Initial /64  Pulse 62  Ht 5' 5\" (1.651 m)  Wt 160 lb 2 oz (72.6 kg)  LMP 11/01/2007  BMI 26.65 kg/m2 Estimated body mass index is 26.65 kg/(m^2) as calculated from the following:    Height as of this encounter: 5' 5\" (1.651 m).    Weight as of this encounter: 160 lb 2 oz (72.6 kg).  Medication Reconciliation: complete  "

## 2017-08-28 NOTE — MR AVS SNAPSHOT
After Visit Summary   8/28/2017    Carlos Mayo    MRN: [id number]           Patient Information     Date Of Birth          1960        Visit Information        Provider Department      8/28/2017 4:20 PM Mayelin Briggs MD [facility]        Today's Diagnoses     Routine general medical examination at a health care facility    -  1    Preop general physical exam          Care Instructions      Preventive Health Recommendations  Female Ages 50 - 64        *   Call about setting up a mammogram: [phone].       Yearly exam: See your health care provider every year in order to  o Review health changes.   o Discuss preventive care.    o Review your medicines if your doctor has prescribed any.      Get a Pap test every three years (unless you have an abnormal result and your provider advises testing more often).    If you get Pap tests with HPV test, you only need to test every 5 years, unless you have an abnormal result.     You do not need a Pap test if your uterus was removed (hysterectomy) and you have not had cancer.    You should be tested each year for STDs (sexually transmitted diseases) if you're at risk.     Have a mammogram every 1 to 2 years.    Have a colonoscopy at age 50, or have a yearly FIT test (stool test). These exams screen for colon cancer.      Have a cholesterol test every 5 years, or more often if advised.    Have a diabetes test (fasting glucose) every three years. If you are at risk for diabetes, you should have this test more often.     If you are at risk for osteoporosis (brittle bone disease), think about having a bone density scan (DEXA).    Shots: Get a flu shot each year. Get a tetanus shot every 10 years.    Nutrition:     Eat at least 5 servings of fruits and vegetables each day.    Eat whole-grain bread, whole-wheat pasta and brown rice instead of white grains and rice.    Talk to your provider about Calcium and Vitamin D.      Lifestyle    Exercise at least 150 minutes a week (30 minutes a day, 5 days a week). This will help you control your weight and prevent disease.    Limit alcohol to one drink per day.    No smoking.     Wear sunscreen to prevent skin cancer.     See your dentist every six months for an exam and cleaning.    See your eye doctor every 1 to 2 years.    Before Your Surgery      Call your surgeon if there is any change in your health. This includes signs of a cold or flu (such as a sore throat, runny nose, cough, rash or fever).    Do not smoke, drink alcohol or take over the counter medicine (unless your surgeon or primary care doctor tells you to) for the 24 hours before and after surgery.    If you take prescribed drugs: Follow your doctor s orders about which medicines to take and which to stop until after surgery.    Eating and drinking prior to surgery: follow the instructions from your surgeon    Take a shower or bath the night before surgery. Use the soap your surgeon gave you to gently clean your skin. If you do not have soap from your surgeon, use your regular soap. Do not shave or scrub the surgery site.  Wear clean pajamas and have clean sheets on your bed.           Follow-ups after your visit        Your next 10 appointments already scheduled     Sep 14, 2017   Procedure with Sammie Peterson MD   [facility] (--)    [id number] [place] [id number]   [phone]            Sep 14, 2017 10:30 AM CDT   XR SURGERY LIBRADO FLUORO L/T 5 MIN with [email or web address]   [facility]    [address] [place] [address]   [phone]           Please bring a list of your current medicines to your exam. (Include vitamins, minerals and over-thecounter medicines.) Leave your valuables at home.  Tell your doctor if there is a chance you may be pregnant.  You do not need to do anything special for this exam.            Sep 25, 2017  8:00  "AM CDT   Return Visit with Sammie Peterson MD   [facility] ([facility])    [address] [address] [place] [address] [phone]              Who to contact     Normal or non-critical lab and imaging results will be communicated to you by eSecure Systemshart, letter or phone within 4 business days after the clinic has received the results. If you do not hear from us within 7 days, please contact the clinic through eSecure Systemshart or phone. If you have a critical or abnormal lab result, we will notify you by phone as soon as possible.  Submit refill requests through [employer] (formerly [employer]) or call your pharmacy and they will forward the refill request to us. Please allow 3 business days for your refill to be completed.          If you need to speak with a  for additional information , please call: [phone]           Additional Information About Your Visit        [employer] (formerly [employer]) Information     [employer] ([employer]) gives you secure access to your electronic health record. If you see a primary care provider, you can also send messages to your care team and make appointments. If you have questions, please call your primary care clinic.  If you do not have a primary care provider, please call [phone] and they will assist you.        Care EveryWhere ID     This is your Care EveryWhere ID. This could be used by other organizations to access your [facility] medical records  [id number]        Your Vitals Were     Pulse Height Last Period BMI (Body Mass Index)          62 5' 5\" (1.651 m) 11/01/2007 26.65 kg/m2         Blood Pressure from Last 3 Encounters:   08/28/17 104/64   08/07/17 114/76   03/13/17 122/62    Weight from Last 3 Encounters:   08/28/17 160 lb 2 oz (72.6 kg)   09/27/16 160 lb 9.6 oz (72.8 kg)   09/26/16 160 lb 9.6 oz (72.8 kg)              We Performed the Following     Basic metabolic panel  (Ca, Cl, CO2, Creat, Gluc, K, Na, BUN)     CBC with platelets     EKG 12-lead complete w/read - " [facility]        Primary Care Provider Office Phone # Fax #    Mayelin Briggs [phone] [address] [place] [address]        Equal Access to Services     ANTOINE ANDRADE : Hadii jag martini michaelo Kelly, waaxda luqadaha, qaybta kaalmada adezhouyada, prakash quiroz laKayadelvin brady. So [facility] [phone].    ATENCIÓN: Si habla español, tiene a gonzalez disposición servicios gratuitos de asistencia lingüística. Llame al [phone].    We comply with applicable federal civil rights laws and [place] laws. We do not discriminate on the basis of race, color, national origin, age, disability sex, sexual orientation or gender identity.            Thank you!     Thank you for choosing [facility]  for your care. Our goal is always to provide you with excellent care. Hearing back from our patients is one way we can continue to improve our services. Please take a few minutes to complete the written survey that you may receive in the mail after your visit with us. Thank you!             Your Updated Medication List - Protect others around you: Learn how to safely use, store and throw away your medicines at www.disposemymeds.org.          This list is accurate as of: 8/28/17  4:38 PM.  Always use your most recent med list.                   Brand Name Dispense Instructions for use Diagnosis    acetaminophen 325 MG tablet    TYLENOL    100 tablet    Take 2 tablets (650 mg) by mouth every 6 hours    Other closed intra-articular fracture of distal end of right radius with routine healing, subsequent encounter       vitamin B complex with vitamin C Tabs tablet      Take 1 tablet by mouth daily        VITAMIN C PO           vitamin D 2000 UNITS Caps

## 2017-08-29 LAB
ANION GAP SERPL CALCULATED.3IONS-SCNC: 6 MMOL/L (ref 3–14)
BUN SERPL-MCNC: 12 MG/DL (ref 7–30)
CALCIUM SERPL-MCNC: 9.2 MG/DL (ref 8.5–10.1)
CHLORIDE SERPL-SCNC: 103 MMOL/L (ref 94–109)
CO2 SERPL-SCNC: 28 MMOL/L (ref 20–32)
CREAT SERPL-MCNC: 0.72 MG/DL (ref 0.52–1.04)
GFR SERPL CREATININE-BSD FRML MDRD: 83 ML/MIN/1.7M2
GLUCOSE SERPL-MCNC: 110 MG/DL (ref 70–99)
POTASSIUM SERPL-SCNC: 3.8 MMOL/L (ref 3.4–5.3)
SODIUM SERPL-SCNC: 137 MMOL/L (ref 133–144)

## 2017-09-13 ENCOUNTER — ANESTHESIA EVENT (OUTPATIENT)
Dept: SURGERY | Facility: AMBULATORY SURGERY CENTER | Age: 57
End: 2017-09-13

## 2017-09-14 ENCOUNTER — HOSPITAL ENCOUNTER (OUTPATIENT)
Facility: AMBULATORY SURGERY CENTER | Age: 57
Discharge: HOME | End: 2017-09-14
Attending: ORTHOPAEDIC SURGERY | Admitting: ORTHOPAEDIC SURGERY
Payer: COMMERCIAL

## 2017-09-14 ENCOUNTER — ANESTHESIA (OUTPATIENT)
Dept: SURGERY | Facility: AMBULATORY SURGERY CENTER | Age: 57
End: 2017-09-14

## 2017-09-14 VITALS
DIASTOLIC BLOOD PRESSURE: 68 MMHG | OXYGEN SATURATION: 99 % | RESPIRATION RATE: 16 BRPM | SYSTOLIC BLOOD PRESSURE: 109 MMHG | TEMPERATURE: 98.3 F

## 2017-09-14 DIAGNOSIS — S52.571D OTHER CLOSED INTRA-ARTICULAR FRACTURE OF DISTAL END OF RIGHT RADIUS WITH ROUTINE HEALING, SUBSEQUENT ENCOUNTER: Primary | ICD-10-CM

## 2017-09-14 PROCEDURE — G8907 PT DOC NO EVENTS ON DISCHARG: HCPCS

## 2017-09-14 PROCEDURE — 20680 REMOVAL OF IMPLANT DEEP: CPT

## 2017-09-14 PROCEDURE — G8918 PT W/O PREOP ORDER IV AB PRO: HCPCS

## 2017-09-14 PROCEDURE — 20680 REMOVAL OF IMPLANT DEEP: CPT | Mod: RT | Performed by: ORTHOPAEDIC SURGERY

## 2017-09-14 RX ORDER — ALBUTEROL SULFATE 0.83 MG/ML
2.5 SOLUTION RESPIRATORY (INHALATION) EVERY 4 HOURS PRN
Status: DISCONTINUED | OUTPATIENT
Start: 2017-09-14 | End: 2017-09-15 | Stop reason: HOSPADM

## 2017-09-14 RX ORDER — CEFAZOLIN SODIUM 2 G/100ML
2 INJECTION, SOLUTION INTRAVENOUS
Status: COMPLETED | OUTPATIENT
Start: 2017-09-14 | End: 2017-09-14

## 2017-09-14 RX ORDER — KETOROLAC TROMETHAMINE 30 MG/ML
30 INJECTION, SOLUTION INTRAMUSCULAR; INTRAVENOUS EVERY 6 HOURS PRN
Status: DISCONTINUED | OUTPATIENT
Start: 2017-09-14 | End: 2017-09-15 | Stop reason: HOSPADM

## 2017-09-14 RX ORDER — DEXAMETHASONE SODIUM PHOSPHATE 4 MG/ML
4 INJECTION, SOLUTION INTRA-ARTICULAR; INTRALESIONAL; INTRAMUSCULAR; INTRAVENOUS; SOFT TISSUE EVERY 10 MIN PRN
Status: DISCONTINUED | OUTPATIENT
Start: 2017-09-14 | End: 2017-09-15 | Stop reason: HOSPADM

## 2017-09-14 RX ORDER — NALOXONE HYDROCHLORIDE 0.4 MG/ML
.1-.4 INJECTION, SOLUTION INTRAMUSCULAR; INTRAVENOUS; SUBCUTANEOUS
Status: DISCONTINUED | OUTPATIENT
Start: 2017-09-14 | End: 2017-09-15 | Stop reason: HOSPADM

## 2017-09-14 RX ORDER — HYDROMORPHONE HYDROCHLORIDE 1 MG/ML
.3-.5 INJECTION, SOLUTION INTRAMUSCULAR; INTRAVENOUS; SUBCUTANEOUS EVERY 10 MIN PRN
Status: DISCONTINUED | OUTPATIENT
Start: 2017-09-14 | End: 2017-09-15 | Stop reason: HOSPADM

## 2017-09-14 RX ORDER — ONDANSETRON 2 MG/ML
4 INJECTION INTRAMUSCULAR; INTRAVENOUS EVERY 30 MIN PRN
Status: DISCONTINUED | OUTPATIENT
Start: 2017-09-14 | End: 2017-09-15 | Stop reason: HOSPADM

## 2017-09-14 RX ORDER — ONDANSETRON 4 MG/1
4 TABLET, ORALLY DISINTEGRATING ORAL EVERY 30 MIN PRN
Status: DISCONTINUED | OUTPATIENT
Start: 2017-09-14 | End: 2017-09-15 | Stop reason: HOSPADM

## 2017-09-14 RX ORDER — SODIUM CHLORIDE, SODIUM LACTATE, POTASSIUM CHLORIDE, CALCIUM CHLORIDE 600; 310; 30; 20 MG/100ML; MG/100ML; MG/100ML; MG/100ML
INJECTION, SOLUTION INTRAVENOUS CONTINUOUS
Status: DISCONTINUED | OUTPATIENT
Start: 2017-09-14 | End: 2017-09-15 | Stop reason: HOSPADM

## 2017-09-14 RX ORDER — FENTANYL CITRATE 50 UG/ML
25-50 INJECTION, SOLUTION INTRAMUSCULAR; INTRAVENOUS
Status: DISCONTINUED | OUTPATIENT
Start: 2017-09-14 | End: 2017-09-15 | Stop reason: HOSPADM

## 2017-09-14 RX ORDER — LIDOCAINE HYDROCHLORIDE 20 MG/ML
INJECTION, SOLUTION INFILTRATION; PERINEURAL PRN
Status: DISCONTINUED | OUTPATIENT
Start: 2017-09-14 | End: 2017-09-14

## 2017-09-14 RX ORDER — CEFAZOLIN SODIUM 1 G/3ML
1 INJECTION, POWDER, FOR SOLUTION INTRAMUSCULAR; INTRAVENOUS SEE ADMIN INSTRUCTIONS
Status: DISCONTINUED | OUTPATIENT
Start: 2017-09-14 | End: 2017-09-15 | Stop reason: HOSPADM

## 2017-09-14 RX ORDER — PROPOFOL 10 MG/ML
INJECTION, EMULSION INTRAVENOUS CONTINUOUS PRN
Status: DISCONTINUED | OUTPATIENT
Start: 2017-09-14 | End: 2017-09-14

## 2017-09-14 RX ORDER — GABAPENTIN 300 MG/1
300 CAPSULE ORAL ONCE
Status: COMPLETED | OUTPATIENT
Start: 2017-09-14 | End: 2017-09-14

## 2017-09-14 RX ORDER — MEPERIDINE HYDROCHLORIDE 25 MG/ML
12.5 INJECTION INTRAMUSCULAR; INTRAVENOUS; SUBCUTANEOUS
Status: DISCONTINUED | OUTPATIENT
Start: 2017-09-14 | End: 2017-09-15 | Stop reason: HOSPADM

## 2017-09-14 RX ORDER — ACETAMINOPHEN 325 MG/1
975 TABLET ORAL ONCE
Status: COMPLETED | OUTPATIENT
Start: 2017-09-14 | End: 2017-09-14

## 2017-09-14 RX ORDER — BUPIVACAINE HYDROCHLORIDE AND EPINEPHRINE 5; 5 MG/ML; UG/ML
INJECTION, SOLUTION PERINEURAL PRN
Status: DISCONTINUED | OUTPATIENT
Start: 2017-09-14 | End: 2017-09-14

## 2017-09-14 RX ORDER — LIDOCAINE HCL/EPINEPHRINE/PF 2%-1:200K
VIAL (ML) INJECTION PRN
Status: DISCONTINUED | OUTPATIENT
Start: 2017-09-14 | End: 2017-09-14

## 2017-09-14 RX ORDER — LIDOCAINE 40 MG/G
CREAM TOPICAL
Status: DISCONTINUED | OUTPATIENT
Start: 2017-09-14 | End: 2017-09-15 | Stop reason: HOSPADM

## 2017-09-14 RX ORDER — FLUMAZENIL 0.1 MG/ML
0.2 INJECTION, SOLUTION INTRAVENOUS
Status: DISCONTINUED | OUTPATIENT
Start: 2017-09-14 | End: 2017-09-15 | Stop reason: HOSPADM

## 2017-09-14 RX ADMIN — ACETAMINOPHEN 975 MG: 325 TABLET ORAL at 07:30

## 2017-09-14 RX ADMIN — BUPIVACAINE HYDROCHLORIDE AND EPINEPHRINE 15 ML: 5; 5 INJECTION, SOLUTION PERINEURAL at 07:55

## 2017-09-14 RX ADMIN — FENTANYL CITRATE 50 MCG: 50 INJECTION, SOLUTION INTRAMUSCULAR; INTRAVENOUS at 07:40

## 2017-09-14 RX ADMIN — LIDOCAINE HYDROCHLORIDE 40 MG: 20 INJECTION, SOLUTION INFILTRATION; PERINEURAL at 08:03

## 2017-09-14 RX ADMIN — LIDOCAINE HYDROCHLORIDE 80 MG: 20 INJECTION, SOLUTION INFILTRATION; PERINEURAL at 08:00

## 2017-09-14 RX ADMIN — SODIUM CHLORIDE, SODIUM LACTATE, POTASSIUM CHLORIDE, CALCIUM CHLORIDE: 600; 310; 30; 20 INJECTION, SOLUTION INTRAVENOUS at 07:20

## 2017-09-14 RX ADMIN — PROPOFOL 125 MCG/KG/MIN: 10 INJECTION, EMULSION INTRAVENOUS at 08:00

## 2017-09-14 RX ADMIN — CEFAZOLIN SODIUM 2 G: 2 INJECTION, SOLUTION INTRAVENOUS at 07:57

## 2017-09-14 RX ADMIN — GABAPENTIN 300 MG: 300 CAPSULE ORAL at 07:30

## 2017-09-14 NOTE — DISCHARGE INSTRUCTIONS
[facility]  Same-Day Surgery   Adult Discharge Orders & Instructions   For 24 hours after surgery  1. Get plenty of rest.  A responsible adult must stay with you for at least 24 hours after you leave the hospital.   2. Do not drive or use heavy equipment.  If you have weakness or tingling, don't drive or use heavy equipment until this feeling goes away.  3. Do not drink alcohol.  4. Avoid strenuous or risky activities.  Ask for help when climbing stairs.   5. You may feel lightheaded.  IF so, sit for a few minutes before standing.  Have someone help you get up.   6. If you have nausea (feel sick to your stomach): Drink only clear liquids such as apple juice, ginger ale, broth or 7-Up.  Rest may also help.  Be sure to drink enough fluids.  Move to a regular diet as you feel able.  7. You may have a slight fever. Call the doctor if your fever is over 100 F (37.7 C) (taken under the tongue) or lasts longer than 24 hours.  8. You may have a dry mouth, a sore throat, muscle aches or trouble sleeping.  These should go away after 24 hours.  9. Do not make important or legal decisions.   Call your doctor for any of the followin.  Signs of infection (fever, growing tenderness at the surgery site, a large amount of drainage or bleeding, severe pain, foul-smelling drainage, redness, swelling).    2. It has been over 8 to 10 hours since surgery and you are still not able to urinate (pass water).    3.  Headache for over 24 hours.    4.  Numbness, tingling or weakness the day after surgery (if you had spinal anesthesia).  To contact a Dr. Peterson call:  [phone] - Ask for Jennifer or Megan   [phone] - Ask for the orthopedic resident on call

## 2017-09-14 NOTE — LETTER
[facility]  [address] [place] [address]  [phone]          September 14, 2017    RE:  Carlos Mayo                                                                                                                                                       [address] [place] [id number]            To whom it may concern:    Carlos Mayo is under my professional care for her right wrist.    She may return to work on Monday, September 18th in her splint.  No lifting, pushing, or pulling with the right hand.  Typing and writing is fine.      Sincerely,        Sammie Peterson MD

## 2017-09-14 NOTE — BRIEF OP NOTE
Orthopedic Brief Operative Note    September 14, 2017    Pre-operative diagnosis: Right distal radius fracture, s/p ORIF   Post-operative diagnosis: Same   Procedure: Right distal radius removal of implants, deep   Surgeon: Sammie Peterson   Assistant(s): None   Anesthesia: Regional and MAC   Estimated blood loss: 5cc   Total IV fluids: (See anesthesia record)   Drains: None   Specimens: None   Implants: Plate and 7 screws removed.   Findings: Removed successfully.   Complications: None   Condition: Stable   Weight bearing status: Partial weight bearing (30 - 50%)   Activity: Activity as tolerated  Patient may move about with assist as indicated or with supervision   Plan: Elevate  Ice  Splint until follow-up    Follow-up: 2 weeks.    Sammie Peterson MD  [phone]

## 2017-09-14 NOTE — ANESTHESIA CARE TRANSFER NOTE
Patient: Carlos MARTE Billing    Procedure(s):  Right distal radius removal of hardware    Diagnosis: right distal radius fracture  Diagnosis Additional Information: No value filed.    Anesthesia Type:   MAC     Note:  Airway :Room Air  Patient transferred to:Phase II        Vitals: (Last set prior to Anesthesia Care Transfer)    CRNA VITALS  9/14/2017 0847 - 9/14/2017 0923      9/14/2017             Pulse: 74    SpO2: 100 %                Electronically Signed By: MANOJ Wagner CRNA  September 14, 2017  9:23 AM

## 2017-09-14 NOTE — ANESTHESIA PROCEDURE NOTES
Peripheral Nerve Block Procedure Note    Staff:     Anesthesiologist:  HIEU DUNCAN  Procedure Start/Stop TImes:      9/14/2017 7:45 AM     9/14/2017 7:55 AM    patient identified, IV checked, site marked, risks and benefits discussed, informed consent, monitors and equipment checked, pre-op evaluation, at physician/surgeon's request and post-op pain management      Correct Patient: Yes      Correct Position: Yes      Correct Site: Yes      Correct Procedure: Yes      Correct Laterality:  Yes    Site Marked:  Yes  Procedure details:     Procedure:  Supraclavicular    Laterality:  Right    Position:  Supine and Sitting    Sterile Prep: chloraprep, mask and sterile gloves      Local skin infiltration:  None    Needle:  Short bevel    Needle gauge:  21    Needle length (inches):  4    Ultrasound: Yes      Ultrasound used to identify targeted nerve, plexus, or vascular structure and placed a needle adjacent to it      Permanent Image entered into patiient's record      Abnormal pain on injection: No      Blood Aspirated: No      Paresthesias:  No    Bleeding at site: No      Test dose negative for signs of intravascular injection: Yes      Bolus via:  Needle    Infusion Method:  Single Shot    Complications:  None  Assessment/Narrative:     Injection made incrementally with aspirations every (mL):  5

## 2017-09-14 NOTE — ANESTHESIA POSTPROCEDURE EVALUATION
Patient: Carlos MARTE Billgarrett    Procedure(s):  Right distal radius removal of hardware    Diagnosis:right distal radius fracture  Diagnosis Additional Information: No value filed.    Anesthesia Type:  MAC    Note:  Anesthesia Post Evaluation    Patient location during evaluation: PACU  Patient participation: Able to fully participate in evaluation  Level of consciousness: awake  Pain management: adequate  Airway patency: patent  Cardiovascular status: acceptable  Respiratory status: acceptable  Hydration status: balanced  PONV: none     Anesthetic complications: None          Last vitals:  Vitals:    09/14/17 0750 09/14/17 0917 09/14/17 0930   BP: 137/83 103/61 109/68   Resp: 16 16 16   Temp:      SpO2: 99% 99% 99%         Electronically Signed By: Meek Hernandez MD  September 14, 2017  3:08 PM

## 2017-09-14 NOTE — ANESTHESIA PREPROCEDURE EVALUATION
Anesthesia Evaluation     .             ROS/MED HX    ENT/Pulmonary:  - neg pulmonary ROS     Neurologic:  - neg neurologic ROS     Cardiovascular:  - neg cardiovascular ROS       METS/Exercise Tolerance:     Hematologic:  - neg hematologic  ROS       Musculoskeletal:  - neg musculoskeletal ROS       GI/Hepatic:  - neg GI/hepatic ROS   (+) GERD       Renal/Genitourinary:  - ROS Renal section negative       Endo:  - neg endo ROS       Psychiatric:  - neg psychiatric ROS       Infectious Disease:  - neg infectious disease ROS       Malignancy:      - no malignancy   Other:    - neg other ROS                 Physical Exam  Normal systems: cardiovascular, pulmonary and dental    Airway   Mallampati: II  TM distance: >3 FB  Neck ROM: full    Dental     Cardiovascular       Pulmonary                     Anesthesia Plan      History & Physical Review  History and physical reviewed and following examination; no interval change.    ASA Status:  1 .    NPO Status:  > 8 hours    Plan for MAC and Periph. Nerve Block for postop pain with Intravenous induction. Maintenance will be TIVA.  Reason for MAC:  Deep or markedly invasive procedure (G8)  PONV prophylaxis:  Ondansetron (or other 5HT-3) and Dexamethasone or Solumedrol       Postoperative Care  Postoperative pain management:  IV analgesics and Oral pain medications.      Consents  Anesthetic plan, risks, benefits and alternatives discussed with:  Patient..                          .

## 2017-09-14 NOTE — IP AVS SNAPSHOT
MRN:[id number]                      After Visit Summary   9/14/2017    Carlos Mayo    MRN: [id number]           Thank you!     Thank you for choosing [facility] for your care. Our goal is always to provide you with excellent care. Hearing back from our patients is one way we can continue to improve our services. Please take a few minutes to complete the written survey that you may receive in the mail after you visit with us. Thank you!        Patient Information     Date Of Birth          1960        About your hospital stay     You were admitted on:  September 14, 2017 You last received care in the:  [facility]    You were discharged on:  September 14, 2017       Who to Call     For medical emergencies, please call 911.  For non-urgent questions about your medical care, please call your primary care provider or clinic, [phone]  For questions related to your surgery, please call your surgery clinic        Attending Provider     Provider Sammie Collins MD Orthopedics       Primary Care Provider Office Phone # Fax #    Mayelin Briggs [phone]      After Care Instructions      Diet as Tolerated       Return to diet before surgery, unless instructed otherwise.            Discharge Instructions       Review outpatient procedure discharge instructions with patient as directed by Provider            Ice to affected area       Ice pack to surgical site every 15 minutes per hour for 24 hours            No Alcohol       For 24 hours post procedure            No Dressing Change       No dressing change until follow up appointment. Keep splint clean, dry, and intact            No driving or operating machinery        until the day after procedure            Notify Provider       For signs and symptoms of infection: Fever greater than 101, redness, swelling, heat at site, drainage, pus.            Return to clinic       Return to clinic in  2 weeks            Weight bearing - Partial       No lifting more than a coffee cup.  Finger ROM encouraged beginning today.                  Your next 10 appointments already scheduled     Sep 14, 2017 10:30 AM CDT   XR SURGERY LIBRADO PEDERSON L/T 5 MIN with MGCARM1   [facility]    [address] [place] [id number] [phone]           Please bring a list of your current medicines to your exam. (Include vitamins, minerals and over-thecounter medicines.) Leave your valuables at home.  Tell your doctor if there is a chance you may be pregnant.  You do not need to do anything special for this exam.            Sep 25, 2017  8:00 AM CDT   Return Visit with Sammie Peterson MD   [facility] ([facility])    [address] [place] [address] [phone]              Further instructions from your care team       [facility]  Same-Day Surgery   Adult Discharge Orders & Instructions   For 24 hours after surgery  1. Get plenty of rest.  A responsible adult must stay with you for at least 24 hours after you leave the hospital.   2. Do not drive or use heavy equipment.  If you have weakness or tingling, don't drive or use heavy equipment until this feeling goes away.  3. Do not drink alcohol.  4. Avoid strenuous or risky activities.  Ask for help when climbing stairs.   5. You may feel lightheaded.  IF so, sit for a few minutes before standing.  Have someone help you get up.   6. If you have nausea (feel sick to your stomach): Drink only clear liquids such as apple juice, ginger ale, broth or 7-Up.  Rest may also help.  Be sure to drink enough fluids.  Move to a regular diet as you feel able.  7. You may have a slight fever. Call the doctor if your fever is over 100 F (37.7 C) (taken under the tongue) or lasts longer than 24 hours.  8. You may have a dry mouth, a sore throat, muscle aches  or trouble sleeping.  These should go away after 24 hours.  9. Do not make important or legal decisions.   Call your doctor for any of the followin.  Signs of infection (fever, growing tenderness at the surgery site, a large amount of drainage or bleeding, severe pain, foul-smelling drainage, redness, swelling).    2. It has been over 8 to 10 hours since surgery and you are still not able to urinate (pass water).    3.  Headache for over 24 hours.    4.  Numbness, tingling or weakness the day after surgery (if you had spinal anesthesia).  To contact a Dr. Peterson call:  [phone] - Ask for Jennifer or Megan   [phone] - Ask for the orthopedic resident on call      Pending Results     No orders found from 2017 to 9/15/2017.            Admission Information     Date & Time Provider Department Dept. Phone    2017 Smamie Peterson MD [facility] [phone]      Your Vitals Were     Blood Pressure Temperature Respirations Last Period Pulse Oximetry       137/83 98.3  F (36.8  C) (Temporal) 16 2007 99%       MyChart Information     [employer] gives you secure access to your electronic health record. If you see a primary care provider, you can also send messages to your care team and make appointments. If you have questions, please call your primary care clinic.  If you do not have a primary care provider, please call [phone] and they will assist you.        Care EveryWhere ID     This is your Care EveryWhere ID. This could be used by other organizations to access your [facility] medical records  [id number]        Equal Access to Services     [facility] : Hadii jag avilez Socarlos alberto, waaxda luqadaha, qaybta kaalmaprakash correa . So [facility] [phone].    ATENCIÓN: Si habla español, tiene a gonzalez disposición servicios gratuitos de asistencia lingüística. Llame al [phone].    We comply with applicable federal civil rights laws  and [place] laws. We do not discriminate on the basis of race, color, national origin, age, disability sex, sexual orientation or gender identity.               Review of your medicines      CONTINUE these medicines which have NOT CHANGED        Dose / Directions    acetaminophen 325 MG tablet   Commonly known as:  TYLENOL   Used for:  Other closed intra-articular fracture of distal end of right radius with routine healing, subsequent encounter        Dose:  650 mg   Take 2 tablets (650 mg) by mouth every 6 hours   Quantity:  100 tablet   Refills:  0       vitamin B complex with vitamin C Tabs tablet        Dose:  1 tablet   Take 1 tablet by mouth daily   Refills:  0       VITAMIN C PO        Refills:  0       vitamin D 2000 UNITS Caps        Refills:  0                Protect others around you: Learn how to safely use, store and throw away your medicines at www.disposemymeds.org.             Medication List: This is a list of all your medications and when to take them. Check marks below indicate your daily home schedule. Keep this list as a reference.      Medications           Morning Afternoon Evening Bedtime As Needed    acetaminophen 325 MG tablet   Commonly known as:  TYLENOL   Take 2 tablets (650 mg) by mouth every 6 hours   Last time this was given:  975 mg on 9/14/2017  7:30 AM                                vitamin B complex with vitamin C Tabs tablet   Take 1 tablet by mouth daily                                VITAMIN C PO                                vitamin D 2000 UNITS Caps

## 2017-09-14 NOTE — IP AVS SNAPSHOT
[facility] [place]    [address] [place] [address]    Phone:  [phone]                                       After Visit Summary   9/14/2017    Carlos Mayo    MRN: [id number]           After Visit Summary Signature Page     I have received my discharge instructions, and my questions have been answered. I have discussed any challenges I see with this plan with the nurse or doctor.    ..........................................................................................................................................  Patient/Patient Representative Signature      ..........................................................................................................................................  Patient Representative Print Name and Relationship to Patient    ..................................................               ................................................  Date                                            Time    ..........................................................................................................................................  Reviewed by Signature/Title    ...................................................              ..............................................  Date                                                            Time

## 2017-09-18 NOTE — OP NOTE
DATE OF SERVICE:  09/14/2017      PREOPERATIVE DIAGNOSIS:  Right intra-articular distal radius fracture, status post open reduction internal fixation.      POSTOPERATIVE DIAGNOSIS:  Right intra-articular distal radius fracture, status post open reduction internal fixation.      PROCEDURE:  Right distal radius removal of implants, deep.      ATTENDING SURGEON:  Sammie Peterson MD      ASSISTANT:  None.      ANESTHESIA:  Regional block, plus monitored anesthetic care.      ESTIMATED BLOOD LOSS:  5 cc.      TOURNIQUET TIME:  38 minutes.      SPECIMENS:  None.      DRAINS:  None.      IMPLANTS:  A plate and 7 screws respectively removed from the dorsal distal radius.      COMPLICATIONS:  None apparent.      BRIEF HISTORY:  Ms. Carlos Mayo is a 57-year-old right-hand-dominant female who is now 11 months status post open reduction internal fixation of a right intra-articular distal radius fracture through a dorsal approach.  We had discussed implant removal at approximately the 1-year claude.  Additionally, she was having soreness and tenderness as well as decreased wrist flexion, which was thought perhaps to be related to the underlying plate.  I described the procedure, postoperative protocol, and expected outcomes.  I also described the risks, which include bleeding, infection, nerve or vessel damage, wound healing problems, persistent pain, persistent stiffness, fracture through a screw hole, the possibility that she requires further surgery.  Anesthetic risks are rare, but include breathing problems, heart problems, stroke, and death.  After a full discussion of risks, benefits, and alternatives to surgery, the patient elected to proceed, and informed consent was obtained.      DESCRIPTION OF PROCEDURE:  The patient was identified in the preoperative holding area.  Her consent was reviewed and signed, and her operative site was identified and marked.  Her history and physical was reviewed.  She received a regional  block by my Anesthesia colleagues.  She was brought to the operating room and transferred to the operating table in a supine position.  A tourniquet was applied to her right upper arm, and her arm was placed onto an arm table.  She was given sedation.  The right arm was prepped and draped in a standard sterile fashion.  A timeout was performed, verifying the correct patient, procedure, site, and side.  Preoperative prophylactic antibiotics were administered.  An Esmarch was used to exsanguinate the limb, and the tourniquet was inflated to 250 mmHg.  Her previous dorsal longitudinal incision was utilized.  This was taken through skin.  Hemostasis was achieved.  The level overlying the fascia was identified, and the skin flaps were elevated both radial and ulnar.  The previous Ethibond sutures from her retinacular repair were identified.  The extensor pollicis longus was identified radially and freed from surrounding soft tissue and protected throughout the procedure.  The extensor retinaculum was again longitudinally incised in line with the previous repair, and the suture material was removed.  The second compartment was elevated radial, and the fourth compartment was elevated ulnar.  The plate was exposed and freed of any overlying soft tissue and bony overgrowth.  There was some bony overgrowth distally at the location of Charles's tubercle where this had been repaired over the plate.  All of these screws were freed of any soft tissue, and sequentially removed, with four distal screws and three shaft screws removed successfully.  An osteotome was placed beneath the plate to help free it from surrounding bony growth, and this was then gently elevated and removed successfully.  A rongeur was used to debride the excess bone and biofilm layer.  The wound was thoroughly irrigated with normal saline.  Final images were obtained to reveal successful removal of the plate and screws.  The extensor retinaculum was repaired  with interrupted 3-0 Ethibond.  The tourniquet was deflated and hemostasis was achieved.  The skin was closed with buried 3-0 Vicryl, followed by a running subcuticular 4-0 Monocryl.  Benzoin and Steri-Strips were applied.  Soft sterile dressings were placed, followed by a well-padded volar resting splint.  The patient tolerated the procedure well and there were no immediate complications.  She was awakened and brought to the recovery room in stable condition.  All sponge and needle counts were correct at the end of the case.      POSTOPERATIVE PLAN:  The patient will discharge to home today with oral pain medications.  She will elevate and ice.  She will begin finger range of motion today.  She will return in a week and a half for splint removal and wound evaluation.  She will transition to a removable brace.  No images are needed at that time.         ERLIN CHRISTOPHER MD             D: 2017 11:51   T: 2017 23:48   MT: EM#[id number]      Name:     GOOD HAY   MRN:      [id number]        Account:        [id number]   :      1960           Procedure Date: 2017      Document: [id number]       cc: Mayelin Briggs MD

## 2017-09-25 ENCOUNTER — OFFICE VISIT (OUTPATIENT)
Dept: ORTHOPEDICS | Facility: CLINIC | Age: 57
End: 2017-09-25
Payer: COMMERCIAL

## 2017-09-25 VITALS — OXYGEN SATURATION: 99 % | DIASTOLIC BLOOD PRESSURE: 64 MMHG | SYSTOLIC BLOOD PRESSURE: 110 MMHG | HEART RATE: 66 BPM

## 2017-09-25 DIAGNOSIS — S52.571D OTHER CLOSED INTRA-ARTICULAR FRACTURE OF DISTAL END OF RIGHT RADIUS WITH ROUTINE HEALING, SUBSEQUENT ENCOUNTER: Primary | ICD-10-CM

## 2017-09-25 PROCEDURE — 99024 POSTOP FOLLOW-UP VISIT: CPT | Performed by: ORTHOPAEDIC SURGERY

## 2017-09-25 ASSESSMENT — PAIN SCALES - GENERAL: PAINLEVEL: NO PAIN (0)

## 2017-09-25 NOTE — MR AVS SNAPSHOT
After Visit Summary   2017    Carlos Mayo    MRN: [id number]           Patient Information     Date Of Birth          1960        Visit Information        Provider Department      2017 8:00 AM Sammie Peterson MD [facility]        Today's Diagnoses     Other closed intra-articular fracture of distal end of right radius with routine healing, subsequent encounter    -  1      Care Instructions    Thanks for coming today.  [facility]  [address] [place] [address]    To schedule future appointments in Ortho Clinic, you may call [phone].    To schedule ordered imaging by your provider:   Call Central Imaging [phone]    To schedule an injection ordered by your provider:  Call Central Imaging Injection scheduling line: [phone]  Rumgr available online at:  [email or web address]    Please call if any further questions or concerns ([phone]).  Clinic hours 8 am to 5 pm.    Return to clinic (call) if symptoms worsen or fail to improve.            Follow-ups after your visit        Who to contact     If you have questions or need follow up information about today's clinic visit or your schedule please contact [facility] directly at [phone].  Normal or non-critical lab and imaging results will be communicated to you by [employer]art, letter or phone within 4 business days after the clinic has received the results. If you do not hear from us within 7 days, please contact the clinic through [employer] or phone. If you have a critical or abnormal lab result, we will notify you by phone as soon as possible.  Submit refill requests through Rumgr or call your pharmacy and they will forward the refill request to us. Please allow 3 business days for your refill to be completed.          Additional Information About Your Visit        [email or web address] Information     Rumgr gives you secure access to your  electronic health record. If you see a primary care provider, you can also send messages to your care team and make appointments. If you have questions, please call your primary care clinic.  If you do not have a primary care provider, please call [phone] and they will assist you.      [employer] is an electronic gateway that provides easy, online access to your medical records. With [employer], you can request a clinic appointment, read your test results, renew a prescription or communicate with your care team.     To access your existing account, please contact your [facility] or call [phone] for assistance.        Care EveryWhere ID     This is your Care EveryWhere ID. This could be used by other organizations to access your [facility] medical records  [id number]        Your Vitals Were     Pulse Last Period Pulse Oximetry             66 11/01/2007 99%          Blood Pressure from Last 3 Encounters:   09/25/17 110/64   09/14/17 109/68   08/28/17 104/64    Weight from Last 3 Encounters:   08/28/17 72.6 kg (160 lb 2 oz)   09/27/16 72.8 kg (160 lb 9.6 oz)   09/26/16 72.8 kg (160 lb 9.6 oz)              Today, you had the following     No orders found for display       Primary Care Provider Office Phone # Fax #    Mayelin Briggs [phone] [address] [place] [facility] [address]        Equal Access to Services     [facility]: Hadii aad vini hadasho Soomaali, waaxda luqadaha, qaybta kaalmada adeegyada, prakash ramirez . So [facility] [phone].    ATENCIÓN: Si habla español, tiene a gonzalez disposición servicios gratuitos de asistencia lingüística. Llame al [phone].    We comply with applicable federal civil rights laws and [place] laws. We do not discriminate on the basis of race, color, national origin, age, disability, sex, sexual orientation, or gender identity.            Thank you!     Thank you for choosing [facility]  for your care. Our goal is always to provide you with excellent care. Hearing back from our patients is one way we can continue to improve our services. Please take a few minutes to complete the written survey that you may receive in the mail after your visit with us. Thank you!             Your Updated Medication List - Protect others around you: Learn how to safely use, store and throw away your medicines at www.disposemymeds.org.          This list is accurate as of: 9/25/17 11:59 PM.  Always use your most recent med list.                   Brand Name Dispense Instructions for use Diagnosis    acetaminophen 325 MG tablet    TYLENOL    100 tablet    Take 2 tablets (650 mg) by mouth every 6 hours    Other closed intra-articular fracture of distal end of right radius with routine healing, subsequent encounter       vitamin B complex with vitamin C Tabs tablet      Take 1 tablet by mouth daily        VITAMIN C PO           vitamin D 2000 UNITS Caps

## 2017-09-25 NOTE — PROGRESS NOTES
Date of Service: September 25, 2017  Procedure: Right distal radius implant removal    Date of Procedure: September 14, 2017    History of Present Illness: Carlos Mayo is a 56 year old, right handed female now almost two weeks status post the above-stated procedure who presents today for routine follow up. The patient reports she is doing well and only experiences mild irritation when flexing and extending her thumb. She denies any numbness or tingling.  No fever or chills.    MEDICATIONS, ALLERGIES, and PAST MEDICAL HISTORY were reviewed and are up-to-date in [facility].    Physical examination:  Vitals: /64  Pulse 66  LMP 11/01/2007  SpO2 99%  Pain is rated 0 out of 10 on the visual analog scale.  QUICKDASH: 18.18  GENERAL: Healthy-appearing adult female in no acute distress.  Alert and oriented times three.  HEENT: Head is normocephalic and atraumatic.  Extraocular movements are intact.  Respiratory: Breathing regular and non-labored.  Examination of the right upper extremity reveals a well healing surgical incision over the dorsal aspect of the wrist covered by Steri Strips. Finger abduction, EPL and FPL remain intact. The patient's neurovascular exam is intact as well, and the digits are warm and well perfused with capillary refill in less than 2 seconds.   Skin: Intact without any rashes or abrasions.    Assessment: 56 year old, right handed female now almost two weeks status post right distal radius hardware removal.    Plan: Mrs. Mayo and I had a discussion about her ongoing recovery. I am very happy with her results thus far and believe she will continue to improve. I explained that I believe the mild stiffness and decreased range of motion will improve with time. I also believe she would benefit from some therapy with Holly, which she will do after our visit today. I encouraged her to contact me with any need to follow up or with other concerns in the future. She is agreeable to the plan and all  of her questions were answered at this time.     I, Sammie Peterson MD, have reviewed the above note and agree with the scribe's notation as written.   I, Mitchell Garcia, am serving as a scribe to document services personally performed by Sammie Peterson MD, based upon my observations and the provider's statements to me. All documentation has been reviewed by the aforementioned doctor prior to being entered into the official medical record.

## 2017-09-25 NOTE — PATIENT INSTRUCTIONS
Thanks for coming today.  [facility]  [address] [place] [address]    To schedule future appointments in Ortho Clinic, you may call [phone].    To schedule ordered imaging by your provider:   Call Central Imaging [phone]    To schedule an injection ordered by your provider:  Call Central Imaging Injection scheduling line: [phone]  [employer] available online at:  [email or web address]    Please call if any further questions or concerns ([phone]).  Clinic hours 8 am to 5 pm.    Return to clinic (call) if symptoms worsen or fail to improve.

## 2017-09-25 NOTE — NURSING NOTE
"Carlos Mayo's goals for this visit include: Right distal radius hardware removal, sx: 9/14/17  She requests these members of her care team be copied on today's visit information: yes    PCP: Mayelin Briggs    Referring Provider:  No referring provider defined for this encounter.    Chief Complaint   Patient presents with     RECHECK     Right distal radius hardware removal, sx: 9/14/17       Initial /64  Pulse 66  LMP 11/01/2007  SpO2 99% Estimated body mass index is 26.65 kg/(m^2) as calculated from the following:    Height as of 8/28/17: 1.651 m (5' 5\").    Weight as of 8/28/17: 72.6 kg (160 lb 2 oz).  Medication Reconciliation: complete    "

## 2017-12-18 ENCOUNTER — HOSPITAL ENCOUNTER (OUTPATIENT)
Dept: MAMMOGRAPHY | Facility: CLINIC | Age: 57
Discharge: HOME | End: 2017-12-18
Attending: INTERNAL MEDICINE | Admitting: INTERNAL MEDICINE
Payer: COMMERCIAL

## 2017-12-18 DIAGNOSIS — Z12.31 VISIT FOR SCREENING MAMMOGRAM: ICD-10-CM

## 2017-12-18 PROCEDURE — G0202 SCR MAMMO BI INCL CAD: HCPCS

## 2018-02-09 ENCOUNTER — TELEPHONE (OUTPATIENT)
Dept: OTHER | Facility: CLINIC | Age: 58
End: 2018-02-09

## 2018-02-09 NOTE — TELEPHONE ENCOUNTER
2/9/2018    Call Regarding Onboarding P1 [facility]    Attempt 1    Message on voicemail     Comments: DEPS DID NOT SHOW           Outreach   AT

## 2018-02-15 NOTE — TELEPHONE ENCOUNTER
2/15/2018       Call Regarding Onboarding P1 [facility]     Attempt 2     Message on voicemail      Comments: DEPS DID NOT SHOW               Outreach ,  Rosaline Humphreys

## 2018-04-03 ENCOUNTER — OFFICE VISIT (OUTPATIENT)
Dept: FAMILY MEDICINE | Facility: CLINIC | Age: 58
End: 2018-04-03
Payer: COMMERCIAL

## 2018-04-03 ENCOUNTER — RADIANT APPOINTMENT (OUTPATIENT)
Dept: GENERAL RADIOLOGY | Facility: CLINIC | Age: 58
End: 2018-04-03
Attending: FAMILY MEDICINE
Payer: COMMERCIAL

## 2018-04-03 VITALS
SYSTOLIC BLOOD PRESSURE: 118 MMHG | HEART RATE: 80 BPM | WEIGHT: 169.38 LBS | HEIGHT: 65 IN | BODY MASS INDEX: 28.22 KG/M2 | DIASTOLIC BLOOD PRESSURE: 80 MMHG

## 2018-04-03 DIAGNOSIS — M25.561 RIGHT KNEE PAIN, UNSPECIFIED CHRONICITY: ICD-10-CM

## 2018-04-03 DIAGNOSIS — Z11.59 NEED FOR HEPATITIS C SCREENING TEST: ICD-10-CM

## 2018-04-03 DIAGNOSIS — M54.2 NECK PAIN: Primary | ICD-10-CM

## 2018-04-03 DIAGNOSIS — I83.90 VARICOSE VEIN OF LEG: ICD-10-CM

## 2018-04-03 PROCEDURE — 99214 OFFICE O/P EST MOD 30 MIN: CPT | Performed by: FAMILY MEDICINE

## 2018-04-03 PROCEDURE — 73560 X-RAY EXAM OF KNEE 1 OR 2: CPT | Mod: RT

## 2018-04-03 PROCEDURE — 86803 HEPATITIS C AB TEST: CPT | Performed by: FAMILY MEDICINE

## 2018-04-03 PROCEDURE — 36415 COLL VENOUS BLD VENIPUNCTURE: CPT | Performed by: FAMILY MEDICINE

## 2018-04-03 NOTE — PROGRESS NOTES
SUBJECTIVE:   Carlos Mayo is a 57 year old female who presents to clinic today for the following health issues:    - Requesting Hep C screen    - Patient has a dark spot on her left temple.    - Upcoming dental appointment, she is wondering about getting at prophylactic prior to appointment.     Knee Pain    Onset: 2 months    Description:   Location: Right knee  Character: Sharp/burning pain when lifting leg, going down steps or when first waking in the morning.    Intensity: moderate    Progression of Symptoms: better    Accompanying Signs & Symptoms:  Other symptoms: Pain started on the outside of knee and now is on the inside. There is a tight feeling at the back of knee and area is swollen    History:   Previous similar pain: no       Precipitating factors:   Trauma or overuse: no     Alleviating factors:  Improved by: ice    Therapies Tried and outcome: Ice with minimal improvment    -She states she had a varicose veins on the outside that could be painful. Her pain now is mostly on the inside of her R knee but has tenderness along the top of her knee as well. She endorses tightness in the back of her knee and this can cause some difficulty when going down stairs.     Neck Pain  Onset: 5 months    Description:   Location: below base of skull on right side  Radiation: Radiates to right shoulder    Intensity: moderate    Progression of Symptoms:  worsening    Accompanying Signs & Symptoms:  Burning, prickly sensation (paresthesias) in arm(s): no   Numbness in arm(s): no   Weakness in arm(s):  no   Fever: no   Headache: YES- occasional  Nausea and/or vomiting: no     History:   Trauma: YES- She states that she did fall 1-2 years ago, fracturing wrist, she was not having neck pain at time of injury  Previous neck pain: no   Previous surgery or injections: no   Previous Imaging (MRI,X ray): no     Precipitating factors:   Does movement increase the pain:  YES    Alleviating factors:  None    Therapies Tried  "and outcome:  Ice/heat pack, stretching, Tylenol and ibuprofen without improvement      Problem list and histories reviewed & adjusted, as indicated.  Additional history: as documented    Problem list, Medication list, Allergies, and Medical/Social/Surgical/Family histories reviewed in [facility] and updated as appropriate.  Labs reviewed in EPIC    ROS:  CONSTITUTIONAL: NEGATIVE for fever, chills, change in weight  INTEGUMENTARY/SKIN: POSITIVE for dark spot on L temple otherwise negative for worrisome rashes, moles or lesions  RESP: NEGATIVE for significant cough or SOB  CV: NEGATIVE for chest pain, palpitations or peripheral edema  MUSCULOSKELETAL: NEGATIVE for significant arthralgias. POSITIVE for R knee and R neck pain.   PSYCHIATRIC: NEGATIVE for changes in mood or affect    This document serves as a record of the services and decisions personally performed and made by Mayelin Briggs MD. It was created on his behalf by Idalia Regalado, a trained medical scribe. The creation of this document is based the provider's statements to the medical scribe.    Idalia Rgealado April 3, 2018 2:05 PM  OBJECTIVE:     /80  Pulse 80  Ht 1.651 m (5' 5\")  Wt 76.8 kg (169 lb 6 oz)  LMP 11/01/2007  BMI 28.19 kg/m2  Body mass index is 28.19 kg/(m^2).  GENERAL: Healthy, alert and no distress  EYES: Eyes grossly normal to inspection, conjunctivae and sclerae normal  RESP: Lungs clear to auscultation - no rales, rhonchi or wheezes  CV: Regular rate and rhythm, normal S1 S2, no murmur  MS: No gross musculoskeletal defects noted, no edema . Mild tenderness along the superior aspect of the right trapezius muscle. Maximal tender over R occiput. No point cervical tenderness noted. R knee showed no significant effusion. Medial and lateral joint lines nontender. Lateral and medial varicose veins noted, medial vein tender to palpation without induration.   NEURO: Normal strength and tone, mentation intact and speech normal  PSYCH: Mentation " appears normal, affect normal/bright    Diagnostic Test Results:    XR Knee Standing Right was ordered. Impression: Mild medial compartment joint space narrowing. Otherwise unremarkable.     ASSESSMENT/PLAN:   (M54.2) Neck pain  (primary encounter diagnosis)  Comment: Given patients persistent symptoms referral given to PT.  Plan: [facility] REFERRAL    (Z11.59) Need for hepatitis C screening test  Comment: Routine screening.   Plan: Hepatitis C Screen Reflex to HCV RNA Quant and         Genotype          (M25.561) Right knee pain, unspecified chronicity  Comment: Given patients persistent symptoms will get X-ray and refer to PT.   Plan: XR Knee Standing Right 2 Views  X-ray and PT.     (I83.90) Varicose vein of leg  Comment: Recommend surgery consult.   Plan: GENERAL SURG ADULT REFERRAL              Patient Instructions   *   You don't need to take antibiotics before dental visits.     *    For the knee and neck, see physical therapy. Call [phone].     *     Repeat the bone density at age 63.     *    You're due for pap smear.     *    Colonoscopy next year, 2019.     *     Also, see Dr. Morris about the varicose vein. Call [phone].       Patient will follow up PRN. Patient instructed to call with any questions or concerns.      The information in this documents, created by a scribe for me, accurately reflects the services I personally performed and the decisions made by me. I have reviewed and approved this document for accuracy. 2:25 PM April 3, 2018    Mayelin Briggs MD  [facility]

## 2018-04-03 NOTE — MR AVS SNAPSHOT
After Visit Summary   4/3/2018    Carlos Mayo    MRN: [id number]           Patient Information     Date Of Birth          1960        Visit Information        Provider Department      4/3/2018 1:40 PM Mayelin Briggs MD [facility]        Today's Diagnoses     Neck pain    -  1    Need for hepatitis C screening test        Right knee pain, unspecified chronicity        Varicose vein of leg          Care Instructions    *   You don't need to take antibiotics before dental visits.     *    For the knee and neck, see physical therapy. Call [phone].     *     Repeat the bone density at age 63.     *    You're due for pap smear.     *    Colonoscopy next year, 2019.     *     Also, see Dr. Morris about the varicose vein. Call [phone].           Follow-ups after your visit        Additional Services     GENERAL SURG ADULT REFERRAL       [place] [facility] [place] [facility] [phone]   [email or web address] Dr. [email or web address].     Please be aware that coverage of these services is subject to the terms and limitations of your health insurance plan.  Call member services at your health plan with any benefit or coverage questions.      Please bring the following with you to your appointment:    (1) Any X-Rays, CTs or MRIs which have been performed.  Contact the facility where they were done to arrange for  prior to your scheduled appointment.   (2) List of current medications   (3) This referral request   (4) Any documents/labs given to you for this referral            [facility] PT, HAND, AND CHIROPRACTIC REFERRAL       **This order will print in the [facility] Scheduling Office**    Physical Therapy, Hand Therapy and Chiropractic Care are available through:    *[facility]  *[facility]  *[facility]    Call one number to schedule at any of the above locations:  [phone].    Your provider has referred you to: [place] [facility] [place] [facility] [place]    Indication/Reason for Referral: Neck Pain, knee pain.  Onset of Illness: 6 months.   Therapy Orders: Evaluate and Treat  Special Programs: None  Special Request: None    Rodolfo Reynolds      Additional Comments for the Therapist or Chiropractor:     Please be aware that coverage of these services is subject to the terms and limitations of your health insurance plan.  Call member services at your health plan with any benefit or coverage questions.      Please bring the following to your appointment:    *Your personal calendar for scheduling future appointments  *Comfortable clothing                  Who to contact     Normal or non-critical lab and imaging results will be communicated to you by Zipzoomhart, letter or phone within 4 business days after the clinic has received the results. If you do not hear from us within 7 days, please contact the clinic through Zipzoomhart or phone. If you have a critical or abnormal lab result, we will notify you by phone as soon as possible.  Submit refill requests through [employer] or call your pharmacy and they will forward the refill request to us. Please allow 3 business days for your refill to be completed.          If you need to speak with a  for additional information , please call: [phone]           Additional Information About Your Visit        [employer]I Information     [employer] gives you secure access to your electronic health record. If you see a primary care provider, you can also send messages to your care team and make appointments. If you have questions, please call your primary care clinic.  If you do not have a primary care provider, please call [phone] and they will assist you.        Care EveryWhere ID     This is your Care EveryWhere ID. This could be used by other organizations to access your [facility] medical records  [id number]        Your Vitals Were      "Pulse Height Last Period BMI (Body Mass Index)          80 5' 5\" (1.651 m) 11/01/2007 28.19 kg/m2         Blood Pressure from Last 3 Encounters:   04/03/18 118/80   09/25/17 110/64   09/14/17 109/68    Weight from Last 3 Encounters:   04/03/18 169 lb 6 oz (76.8 kg)   08/28/17 160 lb 2 oz (72.6 kg)   09/27/16 160 lb 9.6 oz (72.8 kg)              We Performed the Following     GENERAL SURG ADULT REFERRAL     Hepatitis C Screen Reflex to HCV RNA Quant and Genotype     [facility] PT, HAND, AND CHIROPRACTIC REFERRAL        Primary Care Provider Office Phone # Fax #    Mayelin Briggs [phone] [address] [place] [address]        Equal Access to Services     [facility] : Hadii aad ku hadasho Socarlos alberto, waaxda luqadaha, qaybta kaalmada adeegyada, prakash ramirez . So [facility] [phone].    ATENCIÓN: Si habla español, tiene a gonzalez disposición servicios gratuitos de asistencia lingüística. [facility] [phone].    We comply with applicable federal civil rights laws and [place] laws. We do not discriminate on the basis of race, color, national origin, age, disability, sex, sexual orientation, or gender identity.            Thank you!     Thank you for choosing [facility] [place]  for your care. Our goal is always to provide you with excellent care. Hearing back from our patients is one way we can continue to improve our services. Please take a few minutes to complete the written survey that you may receive in the mail after your visit with us. Thank you!             Your Updated Medication List - Protect others around you: Learn how to safely use, store and throw away your medicines at www.disposemymeds.org.          This list is accurate as of 4/3/18  2:55 PM.  Always use your most recent med list.                   Brand Name Dispense Instructions for use Diagnosis    acetaminophen 325 MG tablet    TYLENOL    100 tablet    Take 2 tablets (650 mg) by mouth every " 6 hours    Other closed intra-articular fracture of distal end of right radius with routine healing, subsequent encounter       vitamin B complex with vitamin C Tabs tablet      Take 1 tablet by mouth daily        VITAMIN C PO           vitamin D 2000 UNITS Caps

## 2018-04-03 NOTE — NURSING NOTE
"Chief Complaint   Patient presents with     Neck Pain       Initial /80  Pulse 80  Ht 5' 5\" (1.651 m)  Wt 169 lb 6 oz (76.8 kg)  LMP 11/01/2007  BMI 28.19 kg/m2 Estimated body mass index is 28.19 kg/(m^2) as calculated from the following:    Height as of this encounter: 5' 5\" (1.651 m).    Weight as of this encounter: 169 lb 6 oz (76.8 kg).  Medication Reconciliation: complete  "

## 2018-04-03 NOTE — PATIENT INSTRUCTIONS
*   You don't need to take antibiotics before dental visits.     *    For the knee and neck, see physical therapy. Call [phone].     *     Repeat the bone density at age 63.     *    You're due for pap smear.     *    Colonoscopy next year, 2019.     *     Also, see Dr. Morris about the varicose vein. Call [phone].

## 2018-04-04 LAB — HCV AB SERPL QL IA: NONREACTIVE

## 2018-04-11 ENCOUNTER — THERAPY VISIT (OUTPATIENT)
Dept: PHYSICAL THERAPY | Facility: CLINIC | Age: 58
End: 2018-04-11
Payer: COMMERCIAL

## 2018-04-11 DIAGNOSIS — M54.2 NECK PAIN: Primary | ICD-10-CM

## 2018-04-11 DIAGNOSIS — M25.561 RIGHT KNEE PAIN: ICD-10-CM

## 2018-04-11 PROCEDURE — 97161 PT EVAL LOW COMPLEX 20 MIN: CPT | Mod: GP | Performed by: PHYSICAL THERAPIST

## 2018-04-11 PROCEDURE — 97110 THERAPEUTIC EXERCISES: CPT | Mod: GP | Performed by: PHYSICAL THERAPIST

## 2018-04-11 NOTE — PROGRESS NOTES
[facility] Initial Evaluation  Subjective:  Patient is a 57 year old female presenting with rehab cervical spine hpi.   Carlos Mayo is a 57 year old female with a cervical spine condition.  Condition occurred with:  A fall/slip.  Condition occurred: at home.  This is a new condition  Irma reports today with complains of neck and R shoulder pain. She states that she broke her R arm a year or so ago. She then had a nerve block in sept 2017 and since has been having pain and stiffness in her R upper back shoulder and neck. She states that a lot of the pain stems from the base of her skull. She is R handed. She reports that her pain stops at her shoulder. Sleeping is ok. She reports that lifting and reaching out to the side bothers her shoulder. She states that she feels some clavicle issues when she reaches as well. She reports that her knee started about 6 months ago. She states that the pain is along the lateral aspect of her knee. She also does have some pain along the medial side of her knee. Does feel like its swollen. Also reports that there is crunching and she is having a tough time kneeling. She reports that there is tightness in the back of her knee and was having a tough time getting her knee straight. .    Patient reports pain:  Cervical right side, upper cervical spine, mid cervical spine and lower cervical spine.  Radiates to:  Shoulder right.  Pain is described as sharp, aching and burning and is constant and intermittent and reported as 4/10.  Associated symptoms:  Headache, loss of strength and loss of motion/stiffness. Pain is the same all the time.  Symptoms are exacerbated by rotating head, looking up or down, lifting and carrying and relieved by rest.  Since onset symptoms are unchanged.  Special testing: none]  Previous treatment: none.  Improvement with previous treatment: none.  General health as reported by patient is good.  Pertinent medical history includes:   Overweight and history of fractures.  Medical allergies: no.  Other surgeries include:  Other.    Current occupation is accounting.  Patient is working in normal job without restrictions.  Primary job tasks include:  Prolonged sitting, prolonged standing, lifting and repetitive tasks.    Barriers include:  None as reported by the patient.    Red flags:  None as reported by the patient.                        Objective:CERVICAL:    Posture: forward flexed posture, rounded shoulders    Neurological:    Motor Deficit:  Myotomes L R   C4 (shoulder elevation) wnl wnl   C5 (shoulder abduction) wnl wnl   C6 (elbow flexion) wnl wnl   C7 (elbow extension) wnl wnl   C8 (thumb extension) wnl wnl   T1 (finger add/abd) wnl wnl    Strength (lb)       Sensory Deficit, Reflexes, Dural Signs: wnl    AROM: (Major, Moderate, Minimal or Nil loss)  Movement Loss Jimi Mod Min Nil Pain   Protrusion   x     Flexion   x     Retraction  x      Extension  x      Left Rotation  x      Right Rotation  x      Left Side Bending  x      Right Side bending  x        Repeated movement testing:   (During: produces, abolishes, increases, decreases, no effect, centralizing, peripheralizing; After: better, worse, no better, no worse, no effect, centralized, peripheralized)    Repeated cerv retraction did decrease pain in neck and improved mobility. Following mulligan rotation patient did have improved R rotation and L rotation.    Suspect symptoms are due to degenerative changes in spine and overuse of upper trap muscles due to poor posture, Progress scap stab as tolerated improved cerv mobility     Knee pain due to tightness in lat quad and pes anserine, hamstrings and poor strength in quad muscles. Suspect possible degenerative changes within knee joint that can be contributing to some of patients apoorva will continue to assess.       System    Physical Exam    General     ROS    Assessment/Plan:    Patient is a 57 year old female with neck and right  side knee complaints.    Patient has the following significant findings with corresponding treatment plan.                Diagnosis 1:  Neck and Knee pain   Pain -  hot/cold therapy, US, manual therapy, self management, education and home program  Decreased ROM/flexibility - manual therapy, therapeutic exercise, therapeutic activity and home program  Decreased joint mobility - manual therapy, therapeutic exercise, therapeutic activity and home program  Decreased strength - therapeutic exercise, therapeutic activities and home program  Impaired balance - neuro re-education and home program  Impaired gait - gait training and home program  Impaired muscle performance - neuro re-education and home program  Decreased function - therapeutic activities and home program    Therapy Evaluation Codes:   1) History comprised of:   Personal factors that impact the plan of care:      Time since onset of symptoms and Work status.    Comorbidity factors that impact the plan of care are:      Overweight.     Medications impacting care: None.  2) Examination of Body Systems comprised of:   Body structures and functions that impact the plan of care:      Cervical spine and Knee.   Activity limitations that impact the plan of care are:      Bending, Cooking, Driving, Dressing, Lifting, Sitting, Sports, Squatting/kneeling, Stairs, Walking and Working.  3) Clinical presentation characteristics are:   Stable/Uncomplicated.  4) Decision-Making    Low complexity using standardized patient assessment instrument and/or measureable assessment of functional outcome.  Cumulative Therapy Evaluation is: Low complexity.    Previous and current functional limitations:  (See Goal Flow Sheet for this information)    Short term and Long term goals: (See Goal Flow Sheet for this information)     Communication ability:  Patient appears to be able to clearly communicate and understand verbal and written communication and follow directions  correctly.  Treatment Explanation - The following has been discussed with the patient:   RX ordered/plan of care  Anticipated outcomes  Possible risks and side effects  This patient would benefit from PT intervention to resume normal activities.   Rehab potential is good.    Frequency:  1 X week, once daily  Duration:  for 8 weeks  Discharge Plan:  Achieve all LTG.  Independent in home treatment program.  Reach maximal therapeutic benefit.    Please refer to the daily flowsheet for treatment today, total treatment time and time spent performing 1:1 timed codes.

## 2018-04-12 PROBLEM — M54.2 NECK PAIN: Status: ACTIVE | Noted: 2018-04-12

## 2018-04-12 PROBLEM — M25.561 RIGHT KNEE PAIN: Status: ACTIVE | Noted: 2018-04-12

## 2018-04-12 ASSESSMENT — ACTIVITIES OF DAILY LIVING (ADL)
AS_A_RESULT_OF_YOUR_KNEE_INJURY,_HOW_WOULD_YOU_RATE_YOUR_CURRENT_LEVEL_OF_DAILY_ACTIVITY?: ABNORMAL
RISE FROM A CHAIR: ACTIVITY IS SOMEWHAT DIFFICULT
SWELLING: THE SYMPTOM AFFECTS MY ACTIVITY SLIGHTLY
HOW_WOULD_YOU_RATE_THE_CURRENT_FUNCTION_OF_YOUR_KNEE_DURING_YOUR_USUAL_DAILY_ACTIVITIES_ON_A_SCALE_FROM_0_TO_100_WITH_100_BEING_YOUR_LEVEL_OF_KNEE_FUNCTION_PRIOR_TO_YOUR_INJURY_AND_0_BEING_THE_INABILITY_TO_PERFORM_ANY_OF_YOUR_USUAL_DAILY_ACTIVITIES?: 75
GO UP STAIRS: ACTIVITY IS MINIMALLY DIFFICULT
WEAKNESS: I HAVE THE SYMPTOM BUT IT DOES NOT AFFECT MY ACTIVITY
GIVING WAY, BUCKLING OR SHIFTING OF KNEE: THE SYMPTOM AFFECTS MY ACTIVITY SLIGHTLY
KNEE_ACTIVITY_OF_DAILY_LIVING_SUM: 43
GO DOWN STAIRS: ACTIVITY IS SOMEWHAT DIFFICULT
STAND: ACTIVITY IS MINIMALLY DIFFICULT
WALK: ACTIVITY IS MINIMALLY DIFFICULT
KNEEL ON THE FRONT OF YOUR KNEE: ACTIVITY IS VERY DIFFICULT
PAIN: THE SYMPTOM AFFECTS MY ACTIVITY MODERATELY
LIMPING: I HAVE THE SYMPTOM BUT IT DOES NOT AFFECT MY ACTIVITY
STIFFNESS: THE SYMPTOM AFFECTS MY ACTIVITY SLIGHTLY
KNEE_ACTIVITY_OF_DAILY_LIVING_SCORE: 61.43
SQUAT: ACTIVITY IS VERY DIFFICULT
RAW_SCORE: 43
HOW_WOULD_YOU_RATE_THE_OVERALL_FUNCTION_OF_YOUR_KNEE_DURING_YOUR_USUAL_DAILY_ACTIVITIES?: NEARLY NORMAL
SIT WITH YOUR KNEE BENT: ACTIVITY IS MINIMALLY DIFFICULT

## 2018-04-13 ENCOUNTER — HEALTH MAINTENANCE LETTER (OUTPATIENT)
Age: 58
End: 2018-04-13

## 2018-04-19 NOTE — TELEPHONE ENCOUNTER
4/19/2018    Call Regarding Onboarding P1 MMB    Attempt 3    Message on voicemail     Comments:           Outreach   AT

## 2018-04-27 ENCOUNTER — THERAPY VISIT (OUTPATIENT)
Dept: PHYSICAL THERAPY | Facility: CLINIC | Age: 58
End: 2018-04-27
Payer: COMMERCIAL

## 2018-04-27 DIAGNOSIS — M25.561 RIGHT KNEE PAIN: ICD-10-CM

## 2018-04-27 DIAGNOSIS — M54.2 NECK PAIN: ICD-10-CM

## 2018-04-27 PROCEDURE — 97140 MANUAL THERAPY 1/> REGIONS: CPT | Mod: GP | Performed by: PHYSICAL THERAPIST

## 2018-04-27 PROCEDURE — 97110 THERAPEUTIC EXERCISES: CPT | Mod: GP | Performed by: PHYSICAL THERAPIST

## 2018-05-04 ENCOUNTER — THERAPY VISIT (OUTPATIENT)
Dept: PHYSICAL THERAPY | Facility: CLINIC | Age: 58
End: 2018-05-04
Payer: COMMERCIAL

## 2018-05-04 DIAGNOSIS — M25.561 ACUTE PAIN OF RIGHT KNEE: ICD-10-CM

## 2018-05-04 DIAGNOSIS — M54.2 NECK PAIN: ICD-10-CM

## 2018-05-04 PROCEDURE — 97140 MANUAL THERAPY 1/> REGIONS: CPT | Mod: GP | Performed by: PHYSICAL THERAPY ASSISTANT

## 2018-05-04 PROCEDURE — 97112 NEUROMUSCULAR REEDUCATION: CPT | Mod: GP | Performed by: PHYSICAL THERAPY ASSISTANT

## 2018-05-04 PROCEDURE — 97110 THERAPEUTIC EXERCISES: CPT | Mod: GP | Performed by: PHYSICAL THERAPY ASSISTANT

## 2018-05-11 ENCOUNTER — THERAPY VISIT (OUTPATIENT)
Dept: PHYSICAL THERAPY | Facility: CLINIC | Age: 58
End: 2018-05-11
Payer: COMMERCIAL

## 2018-05-11 DIAGNOSIS — M25.561 ACUTE PAIN OF RIGHT KNEE: ICD-10-CM

## 2018-05-11 DIAGNOSIS — M54.2 NECK PAIN: ICD-10-CM

## 2018-05-11 PROCEDURE — 97110 THERAPEUTIC EXERCISES: CPT | Mod: GP | Performed by: PHYSICAL THERAPIST

## 2018-05-11 PROCEDURE — 97140 MANUAL THERAPY 1/> REGIONS: CPT | Mod: GP | Performed by: PHYSICAL THERAPIST

## 2018-05-16 ENCOUNTER — THERAPY VISIT (OUTPATIENT)
Dept: PHYSICAL THERAPY | Facility: CLINIC | Age: 58
End: 2018-05-16
Payer: COMMERCIAL

## 2018-05-16 DIAGNOSIS — M25.561 ACUTE PAIN OF RIGHT KNEE: ICD-10-CM

## 2018-05-16 DIAGNOSIS — M54.2 NECK PAIN: ICD-10-CM

## 2018-05-16 PROCEDURE — 97110 THERAPEUTIC EXERCISES: CPT | Mod: GP | Performed by: PHYSICAL THERAPIST

## 2018-05-16 PROCEDURE — 97140 MANUAL THERAPY 1/> REGIONS: CPT | Mod: GP | Performed by: PHYSICAL THERAPIST

## 2018-05-17 ENCOUNTER — APPOINTMENT (OUTPATIENT)
Dept: VASCULAR SURGERY | Facility: CLINIC | Age: 58
End: 2018-05-17
Payer: COMMERCIAL

## 2018-05-23 ENCOUNTER — THERAPY VISIT (OUTPATIENT)
Dept: PHYSICAL THERAPY | Facility: CLINIC | Age: 58
End: 2018-05-23
Payer: COMMERCIAL

## 2018-05-23 DIAGNOSIS — M54.2 NECK PAIN: ICD-10-CM

## 2018-05-23 DIAGNOSIS — M25.561 ACUTE PAIN OF RIGHT KNEE: ICD-10-CM

## 2018-05-23 PROCEDURE — 97110 THERAPEUTIC EXERCISES: CPT | Mod: GP | Performed by: PHYSICAL THERAPIST

## 2018-05-23 PROCEDURE — 97140 MANUAL THERAPY 1/> REGIONS: CPT | Mod: GP | Performed by: PHYSICAL THERAPIST

## 2018-05-30 ENCOUNTER — THERAPY VISIT (OUTPATIENT)
Dept: PHYSICAL THERAPY | Facility: CLINIC | Age: 58
End: 2018-05-30
Payer: COMMERCIAL

## 2018-05-30 DIAGNOSIS — M54.2 NECK PAIN: ICD-10-CM

## 2018-05-30 DIAGNOSIS — M25.561 ACUTE PAIN OF RIGHT KNEE: ICD-10-CM

## 2018-05-30 PROCEDURE — 97110 THERAPEUTIC EXERCISES: CPT | Mod: GP | Performed by: PHYSICAL THERAPIST

## 2018-05-30 PROCEDURE — 97140 MANUAL THERAPY 1/> REGIONS: CPT | Mod: GP | Performed by: PHYSICAL THERAPIST

## 2018-06-01 ENCOUNTER — APPOINTMENT (OUTPATIENT)
Dept: VASCULAR SURGERY | Facility: CLINIC | Age: 58
End: 2018-06-01
Payer: COMMERCIAL

## 2018-06-01 ENCOUNTER — OFFICE VISIT (OUTPATIENT)
Dept: VASCULAR SURGERY | Facility: CLINIC | Age: 58
End: 2018-06-01
Payer: COMMERCIAL

## 2018-06-01 DIAGNOSIS — Z53.9 ERRONEOUS ENCOUNTER--DISREGARD: Primary | ICD-10-CM

## 2018-06-01 PROCEDURE — 99203 OFFICE O/P NEW LOW 30 MIN: CPT | Performed by: SURGERY

## 2018-06-01 PROCEDURE — 93971 EXTREMITY STUDY: CPT | Performed by: SURGERY

## 2018-06-01 NOTE — PROGRESS NOTES
[facility] Consultation    Carlos Mayo is a 57-year-old female who presents with complaints of right leg pain and varicose veins.  The pain started about 6 weeks ago and seems to be centered in the area of the knee.  Specifically, she describes a burning pain on the lateral aspect of her knee in the area of a visible varicose vein.  She states she fell 2 years ago fracturing her right arm, requiring open reduction internal fixation.  She struck her knee at that time but did not have symptoms.  The symptoms in the right leg began about 6 months ago.  The pain is worse when she stands for long periods of time and really does not improve with compression or elevation.  She has no history of deep vein thrombosis or superficial thrombophlebitis.    Past medical history  Medical: Negative  Surgical: Open reduction internal fixation right arm fracture 2016, hardware removal right arm 2017    Medicines: None    Allergies: NKA    Social history: She is a non-smoker.  She has alcohol about once per week.  She is a mother of 2 children    12 point review of systems was completed and was reviewed.  It is significant for a 7 pound weight gain, dry cough, occasional irregular heartbeat, neck pain but is otherwise as noted in the history of present illness and past medical history.    Physical exam  General: Pleasant female in no acute distress.  She is 5 feet 5 inches tall weighs 162 pounds blood pressure 133/69 pulse 73 and regular  HEENT: Normocephalic, atraumatic.  EOMI.  External ears and nose normal.  Respiratory: Normal respiratory effort  Cardiovascular: Pulse is regular.  Psychiatric: Judgment, insight, mood and affect are normal  Musculoskeletal: Gait is altered somewhat because of the right knee pain.  There is no obvious deformity of the joints of her fingers or toes.  There is no cyanosis of her nailbeds.  Neurologic: Grossly normal  Extremities: Coursing down the lateral right thigh are small varicose veins  which coursed lateral to the right knee and onto the posterior lateral aspect of the right calf.  This is the area of her burning pain.  There is swelling of the right knee with some fluid on the anteromedial aspect of the right knee.  There is a suggestion of fullness in the right popliteal fossa.  There is tenderness along the medial joint line she has scattered telangiectasias about the right leg.  There are no venous stasis changes.  There is trace edema of her right ankle.  In the left lower extremity I appreciate no varicose veins.  There is no edema or effusion noted of her left knee.  She has scattered telangiectasias primarily below the left knee.  There are no venous stasis changes.  She has 3+ dorsalis pedis and posterior tibial pulses bilaterally.    Duplex ultrasound of her right lower extremity veins reveals no evidence of deep vein thrombosis.  Her right common femoral vein is mildly incompetent.  Her right great saphenous vein is competent except for short segment at the mid calf.  The right small saphenous vein, Vein of Giacomini an anterior accessory saphenous veins are competent.  There are no significant incompetent perforators appreciated.    She has a 7.5 x 2.7 x 1.5 cm Baker's cyst in the right popliteal fossa and a fluid collection on the anteromedial aspect of her right knee.    Impression  Right lower extremity pain and varicose veins with swelling.  It appears that she has knee pathology which likely is the major contributor to her discomfort.  She could have some symptoms from the varicosity coursing down the lateral right leg and lateral to the right knee but there is no underlying valve incompetence that needs to be corrected.  I do not feel the limited right great saphenous vein incompetence is clinically significant and does not need to be treated.    My recommendation would be to have her right knee evaluated by an orthopedic surgeon.  She is currently undergoing physical therapy.   She should wear a knee-high compression stocking and a sleeve over the right knee to see if this would help with her discomfort.    If she has her knee evaluated and the pain is felt not to be related to joint pathology, we could consider sclerotherapy of the varicosity on the lateral aspect of the right thigh and knee.  I was quite osmel in discussing the fact that I could not be sure that treating this vein would alleviate her pain.  She appeared to understand.    MARY Greco MD    Please send a copy of this dictation to Dr. Mayelin Briggs

## 2018-06-05 ENCOUNTER — THERAPY VISIT (OUTPATIENT)
Dept: PHYSICAL THERAPY | Facility: CLINIC | Age: 58
End: 2018-06-05
Payer: COMMERCIAL

## 2018-06-05 DIAGNOSIS — M25.561 ACUTE PAIN OF RIGHT KNEE: ICD-10-CM

## 2018-06-05 DIAGNOSIS — M54.2 NECK PAIN: ICD-10-CM

## 2018-06-05 PROCEDURE — 97140 MANUAL THERAPY 1/> REGIONS: CPT | Mod: GP | Performed by: PHYSICAL THERAPY ASSISTANT

## 2018-06-05 PROCEDURE — 97110 THERAPEUTIC EXERCISES: CPT | Mod: GP | Performed by: PHYSICAL THERAPY ASSISTANT

## 2018-06-12 ENCOUNTER — THERAPY VISIT (OUTPATIENT)
Dept: PHYSICAL THERAPY | Facility: CLINIC | Age: 58
End: 2018-06-12
Payer: COMMERCIAL

## 2018-06-12 DIAGNOSIS — M54.2 NECK PAIN: ICD-10-CM

## 2018-06-12 DIAGNOSIS — M25.561 ACUTE PAIN OF RIGHT KNEE: ICD-10-CM

## 2018-06-12 PROCEDURE — 97110 THERAPEUTIC EXERCISES: CPT | Mod: GP | Performed by: PHYSICAL THERAPIST

## 2018-06-12 PROCEDURE — 97530 THERAPEUTIC ACTIVITIES: CPT | Mod: GP | Performed by: PHYSICAL THERAPIST

## 2018-06-13 ASSESSMENT — ACTIVITIES OF DAILY LIVING (ADL)
STAND: ACTIVITY IS NOT DIFFICULT
KNEE_ACTIVITY_OF_DAILY_LIVING_SUM: 62
KNEEL ON THE FRONT OF YOUR KNEE: ACTIVITY IS SOMEWHAT DIFFICULT
GO DOWN STAIRS: ACTIVITY IS MINIMALLY DIFFICULT
SIT WITH YOUR KNEE BENT: ACTIVITY IS NOT DIFFICULT
AS_A_RESULT_OF_YOUR_KNEE_INJURY,_HOW_WOULD_YOU_RATE_YOUR_CURRENT_LEVEL_OF_DAILY_ACTIVITY?: NEARLY NORMAL
STIFFNESS: I HAVE THE SYMPTOM BUT IT DOES NOT AFFECT MY ACTIVITY
SQUAT: ACTIVITY IS MINIMALLY DIFFICULT
HOW_WOULD_YOU_RATE_THE_CURRENT_FUNCTION_OF_YOUR_KNEE_DURING_YOUR_USUAL_DAILY_ACTIVITIES_ON_A_SCALE_FROM_0_TO_100_WITH_100_BEING_YOUR_LEVEL_OF_KNEE_FUNCTION_PRIOR_TO_YOUR_INJURY_AND_0_BEING_THE_INABILITY_TO_PERFORM_ANY_OF_YOUR_USUAL_DAILY_ACTIVITIES?: 85
WEAKNESS: I DO NOT HAVE THE SYMPTOM
RAW_SCORE: 62
GIVING WAY, BUCKLING OR SHIFTING OF KNEE: I DO NOT HAVE THE SYMPTOM
WALK: ACTIVITY IS NOT DIFFICULT
SWELLING: I HAVE THE SYMPTOM BUT IT DOES NOT AFFECT MY ACTIVITY
KNEE_ACTIVITY_OF_DAILY_LIVING_SCORE: 88.57
RISE FROM A CHAIR: ACTIVITY IS NOT DIFFICULT
LIMPING: I DO NOT HAVE THE SYMPTOM
GO UP STAIRS: ACTIVITY IS MINIMALLY DIFFICULT
PAIN: I HAVE THE SYMPTOM BUT IT DOES NOT AFFECT MY ACTIVITY
HOW_WOULD_YOU_RATE_THE_OVERALL_FUNCTION_OF_YOUR_KNEE_DURING_YOUR_USUAL_DAILY_ACTIVITIES?: NEARLY NORMAL

## 2018-08-31 ENCOUNTER — OFFICE VISIT (OUTPATIENT)
Dept: FAMILY MEDICINE | Facility: CLINIC | Age: 58
End: 2018-08-31
Payer: COMMERCIAL

## 2018-08-31 VITALS
DIASTOLIC BLOOD PRESSURE: 70 MMHG | HEART RATE: 76 BPM | WEIGHT: 167.5 LBS | HEIGHT: 65 IN | BODY MASS INDEX: 27.91 KG/M2 | SYSTOLIC BLOOD PRESSURE: 106 MMHG

## 2018-08-31 DIAGNOSIS — M72.2 PLANTAR FASCIITIS: Primary | ICD-10-CM

## 2018-08-31 PROCEDURE — 99213 OFFICE O/P EST LOW 20 MIN: CPT | Performed by: FAMILY MEDICINE

## 2018-08-31 ASSESSMENT — PAIN SCALES - GENERAL: PAINLEVEL: SEVERE PAIN (7)

## 2018-08-31 NOTE — MR AVS SNAPSHOT
After Visit Summary   8/31/2018    Carlos Mayo    MRN: [id number]           Patient Information     Date Of Birth          1960        Visit Information        Provider Department      8/31/2018 1:20 PM Mayelin Briggs MD [facility]        Today's Diagnoses     Plantar fasciitis    -  1       Follow-ups after your visit        Additional Services     [facility] PT, HAND, AND CHIROPRACTIC REFERRAL       **This order will print in the [facility] Scheduling Office**    Physical Therapy, Hand Therapy and Chiropractic Care are available through:    *[facility]  *[facility]  *[facility]    Call one number to schedule at any of the above locations: [phone].    Your provider has referred you to: Physical Therapy at [facility] or [facility]    Indication/Reason for Referral: Foot Pain  Onset of Illness: 4 months.   Therapy Orders: Evaluate and Treat  Special Programs: None  Special Request: None    Rodolfo Reynolds      Additional Comments for the Therapist or Chiropractor:     Please be aware that coverage of these services is subject to the terms and limitations of your health insurance plan.  Call member services at your health plan with any benefit or coverage questions.      Please bring the following to your appointment:    *Your personal calendar for scheduling future appointments  *Comfortable clothing                  Follow-up notes from your care team     Return in about 1 year (around 8/31/2019).      Who to contact     Normal or non-critical lab and imaging results will be communicated to you by MyChart, letter or phone within 4 business days after the clinic has received the results. If you do not hear from us within 7 days, please contact the clinic through MyChart or phone. If you have a critical or abnormal lab result, we will notify you by phone as soon as possible.  Submit refill requests through iStoryTime or call your pharmacy and  "they will forward the refill request to us. Please allow 3 business days for your refill to be completed.          If you need to speak with a  for additional information , please call: [phone]           Additional Information About Your Visit        NexPlanarhart Information     [email or web address] gives you secure access to your electronic health record. If you see a primary care provider, you can also send messages to your care team and make appointments. If you have questions, please call your primary care clinic.  If you do not have a primary care provider, please call [phone] and they will assist you.        Care EveryWhere ID     This is your Care EveryWhere ID. This could be used by other organizations to access your [place] medical records  [id number]        Your Vitals Were     Pulse Height Last Period BMI (Body Mass Index)          76 5' 5\" (1.651 m) 11/01/2007 27.87 kg/m2         Blood Pressure from Last 3 Encounters:   08/31/18 106/70   04/03/18 118/80   09/25/17 110/64    Weight from Last 3 Encounters:   08/31/18 167 lb 8 oz (76 kg)   04/03/18 169 lb 6 oz (76.8 kg)   08/28/17 160 lb 2 oz (72.6 kg)              We Performed the Following     [facility] PT, HAND, AND CHIROPRACTIC REFERRAL        Primary Care Provider Office Phone # Fax #    Mayelin Briggs [phone] [address] [place] [facility] [address]        Equal Access to Services     [facility] AH: Hadii aad ku hadasho Socarlos alberto, waaxda luqadaha, qaybta kaalmada adeegyada, prakash plummer'delvin brady. So [facility] [phone].    ATENCIÓN: Si habla español, tiene a gonzalez disposición servicios gratuitos de asistencia lingüística. Llame al [phone].    We comply with applicable federal civil rights laws and [place] laws. We do not discriminate on the basis of race, color, national origin, age, disability, sex, sexual orientation, or gender identity.            Thank you!     Thank you for choosing [facility]  for your care. Our goal is always to provide you with excellent care. Hearing back from our patients is one way we can continue to improve our services. Please take a few minutes to complete the written survey that you may receive in the mail after your visit with us. Thank you!             Your Updated Medication List - Protect others around you: Learn how to safely use, store and throw away your medicines at www.disposemymeds.org.          This list is accurate as of 8/31/18 11:59 PM.  Always use your most recent med list.                   Brand Name Dispense Instructions for use Diagnosis    acetaminophen 325 MG tablet    TYLENOL    100 tablet    Take 2 tablets (650 mg) by mouth every 6 hours    Other closed intra-articular fracture of distal end of right radius with routine healing, subsequent encounter       vitamin B complex with vitamin C Tabs tablet      Take 1 tablet by mouth daily        VITAMIN C PO           vitamin D 2000 units Caps

## 2018-08-31 NOTE — PROGRESS NOTES
"  SUBJECTIVE:   Carlos Mayo is a 58 year old female who presents to clinic today for the following health issues:      Joint Pain    Onset: 4 months    Description:   Location: Right heel pain  Character: dull ache with burning feeling that becomes worse when on her feet for long periods    Intensity: moderate    Progression of Symptoms: worse    Accompanying Signs & Symptoms:  Other symptoms: Tingling and soreness into ball of foot    History:   Previous similar pain: no       Precipitating factors:   Trauma or overuse: no     Alleviating factors:  Improved by: None    Therapies Tried and outcome: Rolling foot on frozen ice bottle        Problem list and histories reviewed & adjusted, as indicated.  Additional history: as documented      Reviewed and updated as needed this visit by clinical staff       Reviewed and updated as needed this visit by Provider         OBJECTIVE:     /70  Pulse 76  Ht 5' 5\" (1.651 m)  Wt 167 lb 8 oz (76 kg)  LMP 11/01/2007  BMI 27.87 kg/m2  Body mass index is 27.87 kg/(m^2).    O:  gen: in NAD  Lumbar: no point tenderness to palpation.   MS: bilateral ankle exam is unremarkable, no joint line tenderness to palpation, achilles insertion is non tender. There is tenderness to palpation just distal to the calcaneous, on the sole, that reproduces her symptoms.         ASSESSMENT/PLAN:     (M72.2) Plantar fasciitis  (primary encounter diagnosis)  Comment: seems like a variation of plantar fascitis.   Plan: [facility] PT, HAND, AND CHIROPRACTIC REFERRAL        Activity as tolerated, follow up if not better after PT, sooner if worse, if resolved follow up will be prn.         Mayelin Briggs MD  [facility]    "

## 2018-09-18 ENCOUNTER — THERAPY VISIT (OUTPATIENT)
Dept: PHYSICAL THERAPY | Facility: CLINIC | Age: 58
End: 2018-09-18
Attending: FAMILY MEDICINE
Payer: COMMERCIAL

## 2018-09-18 DIAGNOSIS — M54.2 NECK PAIN: ICD-10-CM

## 2018-09-18 DIAGNOSIS — M79.671 RIGHT FOOT PAIN: Primary | ICD-10-CM

## 2018-09-18 PROCEDURE — 97110 THERAPEUTIC EXERCISES: CPT | Mod: GP | Performed by: PHYSICAL THERAPIST

## 2018-09-18 PROCEDURE — 97161 PT EVAL LOW COMPLEX 20 MIN: CPT | Mod: GP | Performed by: PHYSICAL THERAPIST

## 2018-09-18 NOTE — PROGRESS NOTES
[facility] Initial Evaluation  Subjective:  Patient is a 58 year old female presenting with rehab right ankle/foot hpi. The history is provided by the patient. No  was used.   Carlos Mayo is a 58 year old female with a right foot condition.  Condition occurred with:  Insidious onset.  Condition occurred: for unknown reasons.  This is a chronic condition  Patient reports history of right foot pain beginning 4 months ago with no specific precipitating event at onset. She reports that she was having right knee pain around the time of onset and thinks she may be walking differently. Most recently saw MD for this problem on 8/31/18.    Patient reports pain:  Medial calcaneal tuberosity.  Radiates to:  No radiation.  Pain is described as aching and other (tender) and is intermittent and reported as 6/10.  Associated symptoms:  Loss of motion/stiffness. Pain is worse in the A.M..  Symptoms are exacerbated by walking and standing and relieved by rest.  Since onset symptoms are unchanged.        General health as reported by patient is good.                                              Objective:    Gait:      Deviations:  Ankle:  Push off decr R    Flexibility/Screens:       Lower Extremity:      Decreased right lower extremity flexibility:  Gastroc and Soleus          Ankle/Foot Evaluation  ROM:    AROM:    Dorsiflexion:  Left:   12  Right:   2  Plantarflexion:  Left:  45    Right:  43  Inversion:  Left:  40     Right:  40  Eversion:  10     Right:  5        Strength:    Dorsiflexion:  Left: 5/5      Pain:-   Right: 5/5    Pain:-  Plantarflexion: Left: 5/5    Pain:-   Right: 5-/5   Pain:-  Inversion:Left: 5-/5   Pain:-     Right: 5-/5   Pain:-  Eversion:Left: 5-/5   Pain:-  Right: 5-/5   Pain:-                      PALPATION:     Right ankle tenderness present at:   medial calcaneal                                                    Repeated Movement Testing:  Repeated DF with  belt: produced calf strain during, NW after, decreased pain with ambulation  Repeated DF at wall: produced calf strain during, NW after, decreased pain with ambulation    General     ROS    Assessment/Plan:    Patient is a 58 year old female with right side ankle complaints.    Patient has the following significant findings with corresponding treatment plan.                Diagnosis 1:  Right foot pain    Pain -  self management, education, directional preference exercise and home program  Decreased ROM/flexibility - manual therapy, therapeutic exercise and home program  Decreased strength - therapeutic exercise, therapeutic activities and home program  Impaired gait - gait training  Impaired muscle performance - neuro re-education and home program  Decreased function - therapeutic activities and home program    Therapy Evaluation Codes:   1) History comprised of:   Personal factors that impact the plan of care:      None.    Comorbidity factors that impact the plan of care are:      None.     Medications impacting care: None.  2) Examination of Body Systems comprised of:   Body structures and functions that impact the plan of care:      Ankle.   Activity limitations that impact the plan of care are:      Standing and Walking.  3) Clinical presentation characteristics are:   Stable/Uncomplicated.  4) Decision-Making    Low complexity using standardized patient assessment instrument and/or measureable assessment of functional outcome.  Cumulative Therapy Evaluation is: Low complexity.    Previous and current functional limitations:  (See Goal Flow Sheet for this information)    Short term and Long term goals: (See Goal Flow Sheet for this information)     Communication ability:  Patient appears to be able to clearly communicate and understand verbal and written communication and follow directions correctly.  Treatment Explanation - The following has been discussed with the patient:   RX ordered/plan of  care  Anticipated outcomes  Possible risks and side effects  This patient would benefit from PT intervention to resume normal activities.   Rehab potential is good.    Frequency:  1 X week, once daily  Duration:  for 6 weeks  Discharge Plan:  Achieve all LTG.  Independent in home treatment program.  Reach maximal therapeutic benefit.    Please refer to the daily flowsheet for treatment today, total treatment time and time spent performing 1:1 timed codes.

## 2018-09-20 NOTE — PROGRESS NOTES
[facility] Initial Evaluation  Subjective:  Patient is a 58 year old female presenting with rehab left ankle/foot hpi.                                      Pertinent medical history includes:  Menopausal.    Other surgeries include:  Orthopedic surgery.    Current occupation is ACCOUNTING  .    Primary job tasks include:  Prolonged sitting.                                Objective:  System    Physical Exam    General     ROS    Assessment/Plan:

## 2018-09-25 ENCOUNTER — THERAPY VISIT (OUTPATIENT)
Dept: PHYSICAL THERAPY | Facility: CLINIC | Age: 58
End: 2018-09-25
Payer: COMMERCIAL

## 2018-09-25 DIAGNOSIS — M79.671 RIGHT FOOT PAIN: ICD-10-CM

## 2018-09-25 PROCEDURE — 97140 MANUAL THERAPY 1/> REGIONS: CPT | Mod: GP | Performed by: PHYSICAL THERAPY ASSISTANT

## 2018-09-25 PROCEDURE — 97110 THERAPEUTIC EXERCISES: CPT | Mod: GP | Performed by: PHYSICAL THERAPY ASSISTANT

## 2018-10-02 ENCOUNTER — THERAPY VISIT (OUTPATIENT)
Dept: PHYSICAL THERAPY | Facility: CLINIC | Age: 58
End: 2018-10-02
Payer: COMMERCIAL

## 2018-10-02 DIAGNOSIS — M79.671 RIGHT FOOT PAIN: ICD-10-CM

## 2018-10-02 PROCEDURE — 97110 THERAPEUTIC EXERCISES: CPT | Mod: GP | Performed by: PHYSICAL THERAPIST

## 2018-10-02 PROCEDURE — 97112 NEUROMUSCULAR REEDUCATION: CPT | Mod: GP | Performed by: PHYSICAL THERAPIST

## 2018-10-09 ENCOUNTER — THERAPY VISIT (OUTPATIENT)
Dept: PHYSICAL THERAPY | Facility: CLINIC | Age: 58
End: 2018-10-09
Payer: COMMERCIAL

## 2018-10-09 DIAGNOSIS — M79.671 RIGHT FOOT PAIN: ICD-10-CM

## 2018-10-09 PROCEDURE — 97112 NEUROMUSCULAR REEDUCATION: CPT | Mod: GP | Performed by: PHYSICAL THERAPIST

## 2018-10-09 PROCEDURE — 97110 THERAPEUTIC EXERCISES: CPT | Mod: GP | Performed by: PHYSICAL THERAPIST

## 2018-10-16 ENCOUNTER — THERAPY VISIT (OUTPATIENT)
Dept: PHYSICAL THERAPY | Facility: CLINIC | Age: 58
End: 2018-10-16
Payer: COMMERCIAL

## 2018-10-16 DIAGNOSIS — M79.671 RIGHT FOOT PAIN: ICD-10-CM

## 2018-10-16 PROCEDURE — 97112 NEUROMUSCULAR REEDUCATION: CPT | Mod: GP | Performed by: PHYSICAL THERAPIST

## 2018-10-16 PROCEDURE — 97110 THERAPEUTIC EXERCISES: CPT | Mod: GP | Performed by: PHYSICAL THERAPIST

## 2018-11-13 ENCOUNTER — THERAPY VISIT (OUTPATIENT)
Dept: PHYSICAL THERAPY | Facility: CLINIC | Age: 58
End: 2018-11-13
Payer: COMMERCIAL

## 2018-11-13 DIAGNOSIS — M79.671 RIGHT FOOT PAIN: ICD-10-CM

## 2018-11-13 PROCEDURE — 97110 THERAPEUTIC EXERCISES: CPT | Mod: GP | Performed by: PHYSICAL THERAPIST

## 2018-11-13 PROCEDURE — 97112 NEUROMUSCULAR REEDUCATION: CPT | Mod: GP | Performed by: PHYSICAL THERAPIST

## 2018-12-19 ENCOUNTER — TELEPHONE (OUTPATIENT)
Dept: FAMILY MEDICINE | Facility: CLINIC | Age: 58
End: 2018-12-19

## 2018-12-19 NOTE — LETTER
December 19, 2018      Carlos Mayo  [address] [place] [id number]        Dear Carlos,     As part of [facility]'s commitment to health and wellness we have reviewed your chart and it indicates that you are due for one or more of the following:    -- Physical/pap smear. The last pap that we have on file for you was from 2/12/2015. These are recommended every 3 years. Please call our clinic to schedule your pap smear / physical appointment.     -- Lab tests: You are due for the following: Blood Sugar and Lipid profile. Plan to come fasting 8-10 hours prior to your appointment - nothing to eat or drink EXCEPT water and your medications.    Please try to schedule and/or complete the tests above within the next 2-4 weeks.   The number to call to schedule an appointment at [facility] is [phone].    While we work hard to maintain accurate records, it is always possible that this notice does not accurately reflect tests that you may have had. To ensure that we do not send you unnecessary notices please verify that we have accurate dates of your tests (even if these were done many years ago) or if you are seeking care at another clinic.      Sincerely,     Mayelin Briggs MD

## 2018-12-19 NOTE — TELEPHONE ENCOUNTER
Panel Management Review      Patient has the following on her problem list: None      Composite cancer screening  Chart review shows that this patient is due/due soon for the following Pap Smear, Mammogram and Colonoscopy  Summary:    Patient is due/failing the following:   COLONOSCOPY, MAMMOGRAM and PAP    Action needed:   Patient needs office visit for physical/pap with fasting labs. Will address need for colonoscopy and mammo at visit.    Type of outreach:    Sent letter.    Questions for provider review:    None                                                                                                                                    Ignacia Mclaughlin CMA

## 2019-05-13 ENCOUNTER — OFFICE VISIT (OUTPATIENT)
Dept: FAMILY MEDICINE | Facility: CLINIC | Age: 59
End: 2019-05-13
Payer: COMMERCIAL

## 2019-05-13 VITALS
RESPIRATION RATE: 14 BRPM | HEIGHT: 65 IN | SYSTOLIC BLOOD PRESSURE: 139 MMHG | DIASTOLIC BLOOD PRESSURE: 80 MMHG | BODY MASS INDEX: 28.49 KG/M2 | TEMPERATURE: 97.9 F | HEART RATE: 68 BPM | WEIGHT: 171 LBS

## 2019-05-13 DIAGNOSIS — Z00.00 ROUTINE GENERAL MEDICAL EXAMINATION AT A HEALTH CARE FACILITY: Primary | ICD-10-CM

## 2019-05-13 DIAGNOSIS — Z23 NEED FOR VACCINATION: ICD-10-CM

## 2019-05-13 DIAGNOSIS — Z12.11 SCREEN FOR COLON CANCER: ICD-10-CM

## 2019-05-13 DIAGNOSIS — Z12.4 SCREENING FOR MALIGNANT NEOPLASM OF CERVIX: ICD-10-CM

## 2019-05-13 DIAGNOSIS — N81.4 PROLAPSED UTERUS: ICD-10-CM

## 2019-05-13 DIAGNOSIS — Z11.51 SCREENING FOR HUMAN PAPILLOMAVIRUS: ICD-10-CM

## 2019-05-13 PROCEDURE — 99213 OFFICE O/P EST LOW 20 MIN: CPT | Mod: 25 | Performed by: FAMILY MEDICINE

## 2019-05-13 PROCEDURE — 87624 HPV HI-RISK TYP POOLED RSLT: CPT | Performed by: FAMILY MEDICINE

## 2019-05-13 PROCEDURE — 99396 PREV VISIT EST AGE 40-64: CPT | Mod: 25 | Performed by: FAMILY MEDICINE

## 2019-05-13 PROCEDURE — 90750 HZV VACC RECOMBINANT IM: CPT | Performed by: FAMILY MEDICINE

## 2019-05-13 PROCEDURE — 90471 IMMUNIZATION ADMIN: CPT | Performed by: FAMILY MEDICINE

## 2019-05-13 PROCEDURE — G0145 SCR C/V CYTO,THINLAYER,RESCR: HCPCS | Performed by: FAMILY MEDICINE

## 2019-05-13 ASSESSMENT — PAIN SCALES - GENERAL: PAINLEVEL: NO PAIN (0)

## 2019-05-13 ASSESSMENT — MIFFLIN-ST. JEOR: SCORE: 1346.53

## 2019-05-13 NOTE — PATIENT INSTRUCTIONS
Preventive Health Recommendations  Female Ages 50 - 64    *   Call about setting up a colonoscopy: [phone].    *   Call about setting up a mammogram: [phone].     *   Last pap smear today.     *   For the possible uterine prolapse, see our gynecologist, Dr. Stokes. His office # is [phone]. He's here at [facility] on Tuesdays.     *   Start the shingles shot today, second and final booster in 2-6 months.     *   Blood tests next year.     *   See the dermatologist. Call [phone].         Yearly exam: See your health care provider every year in order to  o Review health changes.   o Discuss preventive care.    o Review your medicines if your doctor has prescribed any.      Get a Pap test every three years (unless you have an abnormal result and your provider advises testing more often).    If you get Pap tests with HPV test, you only need to test every 5 years, unless you have an abnormal result.     You do not need a Pap test if your uterus was removed (hysterectomy) and you have not had cancer.    You should be tested each year for STDs (sexually transmitted diseases) if you're at risk.     Have a mammogram every 1 to 2 years.    Have a colonoscopy at age 50, or have a yearly FIT test (stool test). These exams screen for colon cancer.      Have a cholesterol test every 5 years, or more often if advised.    Have a diabetes test (fasting glucose) every three years. If you are at risk for diabetes, you should have this test more often.     If you are at risk for osteoporosis (brittle bone disease), think about having a bone density scan (DEXA).    Shots: Get a flu shot each year. Get a tetanus shot every 10 years.    Nutrition:     Eat at least 5 servings of fruits and vegetables each day.    Eat whole-grain bread, whole-wheat pasta and brown rice instead of white grains and rice.    Get adequate Calcium and Vitamin D.     Lifestyle    Exercise at least 150 minutes a week (30 minutes a  day, 5 days a week). This will help you control your weight and prevent disease.    Limit alcohol to one drink per day.    No smoking.     Wear sunscreen to prevent skin cancer.     See your dentist every six months for an exam and cleaning.    See your eye doctor every 1 to 2 years.

## 2019-05-13 NOTE — NURSING NOTE
Screening Questionnaire for Adult Immunization    Are you sick today?   No   Do you have allergies to medications, food, a vaccine component or latex?   No   Have you ever had a serious reaction after receiving a vaccination?   No   Do you have a long-term health problem with heart disease, lung disease, asthma, kidney disease, metabolic disease (e.g. diabetes), anemia, or other blood disorder?   No   Do you have cancer, leukemia, HIV/AIDS, or any other immune system problem?   No   In the past 3 months, have you taken medications that affect  your immune system, such as prednisone, other steroids, or anticancer drugs; drugs for the treatment of rheumatoid arthritis, Crohn s disease, or psoriasis; or have you had radiation treatments?   No   Have you had a seizure, or a brain or other nervous system problem?   No   During the past year, have you received a transfusion of blood or blood     products, or been given immune (gamma) globulin or antiviral drug?   No   For women: Are you pregnant or is there a chance you could become        pregnant during the next month?   No   Have you received any vaccinations in the past 4 weeks?   No     Immunization questionnaire answers were all negative.          Screening performed by Ignacia Mclaughlin on 5/13/2019 at 2:36 PM.

## 2019-05-16 LAB
COPATH REPORT: NORMAL
PAP: NORMAL

## 2019-05-20 LAB
FINAL DIAGNOSIS: NORMAL
HPV HR 12 DNA CVX QL NAA+PROBE: NEGATIVE
HPV16 DNA SPEC QL NAA+PROBE: NEGATIVE
HPV18 DNA SPEC QL NAA+PROBE: NEGATIVE
SPECIMEN DESCRIPTION: NORMAL
SPECIMEN SOURCE CVX/VAG CYTO: NORMAL

## 2019-05-27 NOTE — PROGRESS NOTES
SUBJECTIVE:   CC: Carlos aMyo is an 58 year old woman who presents for preventive health visit.     Healthy Habits:    Do you get at least three servings of calcium containing foods daily (dairy, green leafy vegetables, etc.)? yes    Amount of exercise or daily activities, outside of work: 1 hour per day    Problems taking medications regularly not applicable    Medication side effects: No    Have you had an eye exam in the past two years? yes    Do you see a dentist twice per year? yes    Do you have sleep apnea, excessive snoring or daytime drowsiness?no    This past weekend she and  celebrated his birthday, their 39th anniversary and mother's day!    Prolapsed Uterus  Patient notes mass in vagina, possible prolapsed uterus x1 week. No vaginal pain, odor or itching. No urinary sx.    Today's PHQ-2 Score:   PHQ-2 ( 1999 Pf[employer]) 5/13/2019 8/28/2017   Q1: Little interest or pleasure in doing things 0 0   Q2: Feeling down, depressed or hopeless 0 0   PHQ-2 Score 0 0   Q1: Little interest or pleasure in doing things - -   Q2: Feeling down, depressed or hopeless - -   PHQ-2 Score - -       Abuse: Current or Past(Physical, Sexual or Emotional)- No  Do you feel safe in your environment? Yes    Social History     Tobacco Use     Smoking status: Never Smoker     Smokeless tobacco: Never Used   Substance Use Topics     Alcohol use: Yes     Alcohol/week: 1.0 oz     Comment: 2-3 times a month     If you drink alcohol do you typically have >3 drinks per day or >7 drinks per week? No                     Reviewed orders with patient.  Reviewed health maintenance and updated orders accordingly - Yes  Labs reviewed in [facility]    Mammogram Screening: Patient over age 50, mutual decision to screen reflected in health maintenance.    Pertinent mammograms are reviewed under the imaging tab.  History of abnormal Pap smear: YES - updated in Problem List and Health Maintenance accordingly - one abnormal pap 2010.  PAP / HPV  "2/12/2015 11/22/2011 10/4/2010   PAP NIL NIL ASC-US(A)     Reviewed and updated as needed this visit by clinical staff  Tobacco  Allergies  Meds  Med Hx  Surg Hx  Fam Hx  Soc Hx        Reviewed and updated as needed this visit by Provider          ROS:  CONSTITUTIONAL: NEGATIVE for fever, chills, change in weight  INTEGUMENTARY/SKIN: NEGATIVE for worrisome rashes, moles or lesions  EYES: NEGATIVE for vision changes or irritation  ENT: NEGATIVE for ear, mouth and throat problems  RESP: NEGATIVE for significant cough or SOB  CV: NEGATIVE for chest pain, palpitations or peripheral edema  GI: NEGATIVE for nausea, abdominal pain, heartburn, or change in bowel habits   menopausal female: POSITIVE for vaginal mass (prolapsed uterus)  MUSCULOSKELETAL: NEGATIVE for significant arthralgias or myalgia  NEURO: NEGATIVE for weakness, dizziness or paresthesias  PSYCHIATRIC: NEGATIVE for changes in mood or affect     This document serves as a record of the services and decisions personally performed and made by Mayelin Briggs MD. It was created on his behalf by Hany Aguilera, a trained medical scribe. The creation of this document is based the provider's statements to the medical scribe.  Hany Aguilera 1:57 PM May 13, 2019    OBJECTIVE:   /80   Pulse 68   Temp 97.9  F (36.6  C) (Tympanic)   Resp 14   Ht 1.651 m (5' 5\")   Wt 77.6 kg (171 lb)   LMP 11/01/2007   BMI 28.46 kg/m    EXAM:  GENERAL: healthy, alert and no distress  EYES: Eyes grossly normal to inspection, conjunctivae and sclerae normal  HENT: ear canals and TM's normal, nose and mouth without ulcers or lesions  NECK: no adenopathy, no asymmetry, masses, or scars and thyroid normal to palpation  RESP: lungs clear to auscultation - no rales, rhonchi or wheezes  CV: regular rate and rhythm, normal S1 S2  ABDOMEN: soft, nontender   (female): normal female external genitalia, normal urethral meatus  and vaginal mucosa pink  MS: no gross musculoskeletal " defects noted, no edema  SKIN: no suspicious lesions or rashes  NEURO: Normal strength and tone, mentation intact and speech normal  PSYCH: mentation appears normal, affect normal/bright    Results for orders placed or performed in visit on 05/13/19   Pap imaged thin layer screen with HPV - recommended age 30 - 65   Result Value Ref Range    PAP NIL     Copath Report         Patient Name: GOOD HAY  MR#: [id number]  Specimen #: [id number]  Collected: 5/13/2019  Received: 5/14/2019  Reported: 5/16/2019 10:21  Ordering Phy(s): BARYR VILLEGAS    For improved result formatting, select 'View Enhanced Report Format' under   Linked Documents section.    SPECIMEN/STAIN PROCESS:  Pap imaged thin layer prep screening (Surepath, FocalPoint with guided   screening)       Pap-Cyto x 1, HPV ordered x 1    SOURCE: Cervical, endocervical  ----------------------------------------------------------------   Pap imaged thin layer prep screening (Surepath, FocalPoint with guided   screening)  SPECIMEN ADEQUACY:  Satisfactory for evaluation.  -Transformation zone component present.    CYTOLOGIC INTERPRETATION:    Negative for intraepithelial lesion or malignancy    Electronically signed out by:  JONATHAN Skaggs (ASCP)    CLINICAL HISTORY:  LMP: 11/01/2007  Post Menopausal, A previous normal pap  Date of Last Pap: 02/12/2015,    Papanicolaou Test Limitations:  Ce rvical cytology is a screening test with   limited sensitivity; regular  screening is critical for cancer prevention; Pap tests are primarily   effective for the diagnosis/prevention of  squamous cell carcinoma, not adenocarcinomas or other cancers.    COLLECTION SITE:  Client:  [facility]  Location: [facility] ()    The technical component of this testing was completed at the [facility], with the professional component performed   at the [facility]  [facility], [address] [place] [address],   [place] [address] ([phone])       HPV High Risk Types DNA Cervical   Result Value Ref Range    HPV Source SurePath     HPV 16 DNA Negative NEG^Negative    HPV 18 DNA Negative NEG^Negative    Other HR HPV Negative NEG^Negative    Final Diagnosis This patient's sample is negative for HPV DNA.     Specimen Description Cervical Cells            ASSESSMENT/PLAN:   (Z00.00) Routine general medical examination at a health care facility  (primary encounter diagnosis)  Comment: Reviewed lifestyle, current conditions, medications, routine screening and labs.  Plan: Annual physical in 1 year, sooner for other health maintenance.    (Z11.51) Screening for human papillomavirus  Comment: Preventative screening  Plan: HPV High Risk Types DNA Cervical          (Z12.4) Screening for malignant neoplasm of cervix  Comment: Preventative screening  Plan: Pap imaged thin layer screen with HPV -         recommended age 30 - 65          (Z12.11) Screen for colon cancer  Comment: Preventative screening  Plan: GASTROENTEROLOGY ADULT REF PROCEDURE ONLY [facility] [phone]; [facility]         Surgeon          (N81.4) Prolapsed uterus  Comment: will refer for consultation   Plan: OB/GYN REFERRAL      Patient Instructions     *   Call about setting up a colonoscopy: [phone].    *   Call about setting up a mammogram: [phone].     *   Last pap smear today.     *   For the possible uterine prolapse, see our gynecologist, Dr. Stokes. His office # is [phone]. He's here at [facility] on Tuesdays.     *   Start the shingles shot today, second and final booster in 2-6 months.     *   Blood tests next year.     *   See the dermatologist. Call [phone].       COUNSELING:   Reviewed preventive health counseling, as reflected in patient instructions    Estimated body mass index is 28.46 kg/m  as calculated from the following:     "Height as of this encounter: 1.651 m (5' 5\").    Weight as of this encounter: 77.6 kg (171 lb).       reports that she has never smoked. She has never used smokeless tobacco.      Counseling Resources:  ATP IV Guidelines  Pooled Cohorts Equation Calculator  Breast Cancer Risk Calculator  FRAX Risk Assessment  ICSI Preventive Guidelines  Dietary Guidelines for Americans, 2010  USDA's MyPlate  ASA Prophylaxis  Lung CA Screening    The information in this document, created by a scribe for me, accurately reflects the services I personally performed and the decisions made by me. I have reviewed and approved this document for accuracy.     Mayelin Briggs MD  [facility]  "

## 2019-05-29 PROBLEM — M79.671 RIGHT FOOT PAIN: Status: RESOLVED | Noted: 2018-09-18 | Resolved: 2019-05-29

## 2019-05-29 NOTE — PROGRESS NOTES
Discharge Note    Progress reporting period is from initial eval to Nov 13, 2018.     Please see information below for last relevant information on current status.  Patient seen for 6 visits.    SUBJECTIVE  Subjective changes noted by patient:  Pt states she is still doing well. Daughter had a baby recently so hasn't been as consistent with HEP. Plans on doing more with the HEP as she is done with PT. Pt still able to do all the things she needs to do but just gets a little more pain later. Also notes previous R knee weakness that she feels may have lead to her foot pain.   .  Current pain level is 1/10.     Previous pain level was  6/10.   Changes in function:  Yes (See Goal flowsheet attached for changes in current functional level)  Adverse reaction to treatment or activity: None    OBJECTIVE  Changes noted in objective findings: 10 degrees DF, 60 degrees PF     ASSESSMENT/PLAN  Diagnosis: right foot pain / PF   DIAGP:  The encounter diagnosis was Right foot pain.  STG/LTGs have been met or progress has been made towards goals:  Yes, please see goal flowsheet for most current information  Assessment of Progress: current status is unknown.    Last current status: Pt is progressing well, Pt is progressing as expected, Pt is becoming more independent in their HEP   Self Management Plans:  HEP  I have re-evaluated this patient and find that the nature, scope, duration and intensity of the therapy is appropriate for the medical condition of the patient.  Carlos continues to require the following intervention to meet STG and LTG's:  HEP.    Recommendations:  Discharge with current home program.  Patient to follow up with MD as needed.    Please refer to the daily flowsheet for treatment today, total treatment time and time spent performing 1:1 timed codes.

## 2019-06-06 PROBLEM — M54.2 NECK PAIN: Status: RESOLVED | Noted: 2018-04-12 | Resolved: 2019-06-06

## 2019-06-06 PROBLEM — M25.561 RIGHT KNEE PAIN: Status: RESOLVED | Noted: 2018-04-12 | Resolved: 2019-06-06

## 2019-09-28 ENCOUNTER — HEALTH MAINTENANCE LETTER (OUTPATIENT)
Age: 59
End: 2019-09-28

## 2019-10-23 ENCOUNTER — OFFICE VISIT (OUTPATIENT)
Dept: FAMILY MEDICINE | Facility: CLINIC | Age: 59
End: 2019-10-23
Payer: COMMERCIAL

## 2019-10-23 VITALS
HEIGHT: 65 IN | OXYGEN SATURATION: 99 % | SYSTOLIC BLOOD PRESSURE: 102 MMHG | HEART RATE: 65 BPM | DIASTOLIC BLOOD PRESSURE: 62 MMHG | RESPIRATION RATE: 16 BRPM | BODY MASS INDEX: 27.89 KG/M2 | TEMPERATURE: 97.4 F | WEIGHT: 167.4 LBS

## 2019-10-23 DIAGNOSIS — M25.561 CHRONIC PAIN OF RIGHT KNEE: Primary | ICD-10-CM

## 2019-10-23 DIAGNOSIS — G89.29 CHRONIC PAIN OF RIGHT KNEE: Primary | ICD-10-CM

## 2019-10-23 PROCEDURE — 99214 OFFICE O/P EST MOD 30 MIN: CPT | Performed by: FAMILY MEDICINE

## 2019-10-23 ASSESSMENT — MIFFLIN-ST. JEOR: SCORE: 1325.2

## 2019-10-23 NOTE — PROGRESS NOTES
"Subjective     Carlos Mayo is a 59 year old female who presents to clinic today for the following health issues:    HPI   Musculoskeletal problem/pain      Duration: ongoing for 2 years    Description  Location: right knee    Intensity:  moderate    Accompanying signs and symptoms: swelling    History  Previous similar problem: YES  Previous evaluation:  x-ray    Precipitating or alleviating factors:  Trauma or overuse: no   Aggravating factors include: going down stairs and hard time kneeling on knee    Therapies tried and outcome: rest/inactivity, ice, stretching, exercises, acetaminophen and physical therapy    Patient reports knee has started to buckle and continues to have posterior pain behind knee. Was informed that she had a baker's cyst. Reports lateral pain, along the knee.   Patient is unable to extend leg out and knee is tight when trying to flex.    Reviewed and updated as needed this visit by Provider         Review of Systems   ROS COMP: CONSTITUTIONAL:NEGATIVE for fever, chills, change in weight  INTEGUMENTARY/SKIN: NEGATIVE for worrisome rashes, moles or lesions  ENT/MOUTH: NEGATIVE for ear, mouth and throat problems  RESP:NEGATIVE for significant cough or SOB  CV: NEGATIVE for chest pain, palpitations or peripheral edema  MUSCULOSKELETAL: POSITIVE for right knee pain  PSYCHIATRIC: NEGATIVE for changes in mood or affect    This document serves as a record of the services and decisions personally performed and made by Mayelin Briggs MD. It was created on his behalf by Hany Aguilera, a trained medical scribe. The creation of this document is based the provider's statements to the medical scribe.  Hany Aguilera 11:02 AM October 23, 2019        Objective    /62   Pulse 65   Temp 97.4  F (36.3  C) (Tympanic)   Resp 16   Ht 1.651 m (5' 5\")   Wt 75.9 kg (167 lb 6.4 oz)   LMP 11/01/2007   SpO2 99%   BMI 27.86 kg/m    Body mass index is 27.86 kg/m .  Physical Exam   GENERAL: alert, pleasant " "and no distress  RESP: lungs clear to auscultation - no rales, rhonchi or wheezes  CV: regular rate and rhythm, normal S1 S2  MS: right knee, no effusion, tenderness to palpation along the medial aspect of the joint, patellar tracts in a normal manner and is nontender. Negative anterior and posterior draw sign.   SKIN: no suspicious lesions or rashes  PSYCH: mentation appears normal, affect normal/bright    Diagnostic Test Results:  none        Assessment & Plan     (M25.561,  G89.29) Chronic pain of right knee  (primary encounter diagnosis)  Comment: No improvement with physical therapy, plain radiographs from 18 months ago showed some medial degenerative changes.  Patient still symptomatic, will refer for advanced imaging.  Plan: MR Knee Right w/o Contrast        Await test results.  This will determine next step either continued physical therapy or orthopedic referral.    Patient Instructions   *   Call about setting up a MRI of your knee. Ask of the [facility] location.     *   I'll contact you with the results and we'll see what the next step is.      BMI:   Estimated body mass index is 27.86 kg/m  as calculated from the following:    Height as of this encounter: 1.651 m (5' 5\").    Weight as of this encounter: 75.9 kg (167 lb 6.4 oz).   Weight management plan: Discussed healthy diet and exercise guidelines        No follow-ups on file.    The information in this document, created by a scribe for me, accurately reflects the services I personally performed and the decisions made by me. I have reviewed and approved this document for accuracy.     Mayelin Briggs MD  [facility]      "

## 2019-10-23 NOTE — PATIENT INSTRUCTIONS
*   Call about setting up a MRI of your knee. Ask of the [facility] location.     *   I'll contact you with the results and we'll see what the next step is.

## 2019-10-28 ENCOUNTER — ANCILLARY PROCEDURE (OUTPATIENT)
Dept: MRI IMAGING | Facility: CLINIC | Age: 59
End: 2019-10-28
Attending: FAMILY MEDICINE
Payer: COMMERCIAL

## 2019-10-28 DIAGNOSIS — M25.561 CHRONIC PAIN OF RIGHT KNEE: ICD-10-CM

## 2019-10-28 DIAGNOSIS — G89.29 CHRONIC PAIN OF RIGHT KNEE: ICD-10-CM

## 2019-10-28 PROCEDURE — 73721 MRI JNT OF LWR EXTRE W/O DYE: CPT | Mod: TC

## 2019-11-11 ENCOUNTER — OFFICE VISIT (OUTPATIENT)
Dept: ORTHOPEDICS | Facility: CLINIC | Age: 59
End: 2019-11-11
Payer: COMMERCIAL

## 2019-11-11 ENCOUNTER — ANCILLARY PROCEDURE (OUTPATIENT)
Dept: GENERAL RADIOLOGY | Facility: CLINIC | Age: 59
End: 2019-11-11
Attending: ORTHOPAEDIC SURGERY
Payer: COMMERCIAL

## 2019-11-11 VITALS
HEART RATE: 81 BPM | SYSTOLIC BLOOD PRESSURE: 113 MMHG | HEIGHT: 65 IN | BODY MASS INDEX: 28.07 KG/M2 | WEIGHT: 168.5 LBS | DIASTOLIC BLOOD PRESSURE: 76 MMHG

## 2019-11-11 DIAGNOSIS — G89.29 CHRONIC PAIN OF RIGHT KNEE: ICD-10-CM

## 2019-11-11 DIAGNOSIS — M17.11 PRIMARY OSTEOARTHRITIS OF RIGHT KNEE: Primary | ICD-10-CM

## 2019-11-11 DIAGNOSIS — M25.561 CHRONIC PAIN OF RIGHT KNEE: ICD-10-CM

## 2019-11-11 PROCEDURE — 99243 OFF/OP CNSLTJ NEW/EST LOW 30: CPT | Performed by: ORTHOPAEDIC SURGERY

## 2019-11-11 PROCEDURE — 73560 X-RAY EXAM OF KNEE 1 OR 2: CPT | Mod: RT

## 2019-11-11 ASSESSMENT — MIFFLIN-ST. JEOR: SCORE: 1330.19

## 2019-11-11 ASSESSMENT — PAIN SCALES - GENERAL: PAINLEVEL: MODERATE PAIN (5)

## 2019-11-11 NOTE — LETTER
11/11/2019         RE: Carlos Mayo  [address] [address]        Dear Colleague,    Thank you for referring your patient, Carlos Mayo, to the [facility]. Please see a copy of my visit note below.    CHIEF COMPLAINT:   Chief Complaint   Patient presents with     Right Knee - Pain     Onset: 2 years. NKI. Pain has remained about the same. She notes she fell 3 years ago, broke her arm but didn't have any knee pain. Pain is medial and lateral. Pain with stairs, kneeling, getting up from a chair. She has done physical therapy.    .    Carlos Mayo is seen today in the [facility] for evaluation of right knee pain at the request of Dr. Mayelin Briggs MD    HISTORY OF PRESENT ILLNESS    Carlos Mayo is a 59 year old female seen for evaluation of ongoing right knee pain with no known injury.   Pain has been present for at least 2 years. No known injury. Locates along the outside/back of the knee (sharp) and the inner aspect (burning pain). Aggravated with sit to stand, going down stairs, getting up off the floor, walking. Ok at rest with occasional pain at night. Denies hip or low back pain, numbness and tingling. Treatment with Physical Therapy over a year ago, knee sleeve. Thought maybe was due to varicose veins but states she saw a vein doctor recently and told her veins were fine. She's not had injections nor take any pain medications.    Pain severity: 5/10      Other PMH:  has a past medical history of Esophageal reflux, Palpitations, and Unspecified acute reaction to stress.  Patient Active Problem List   Diagnosis     PALPITATIONS - rare     Cervical stenosis (uterine cervix)     CARDIOVASCULAR SCREENING; LDL GOAL LESS THAN 160     24 hr info given     Advanced directives, counseling/discussion       Surgical Hx:  has a past surgical history that includes surgical history of -  (1980); Open reduction internal fixation  "wrist (Right, 9/27/2016); Remove hardware wrist (Right, 9/14/2017); and colonoscopy (09/14/2009).    Medications:   Current Outpatient Medications:      acetaminophen (TYLENOL) 325 MG tablet, Take 2 tablets (650 mg) by mouth every 6 hours (Patient not taking: Reported on 10/23/2019), Disp: 100 tablet, Rfl: 0     Ascorbic Acid (VITAMIN C PO), , Disp: , Rfl:      Cholecalciferol (VITAMIN D) 2000 UNITS CAPS, , Disp: , Rfl:      vitamin  B complex with vitamin C (VITAMIN  B COMPLEX) TABS, Take 1 tablet by mouth daily, Disp: , Rfl:     Allergies: No Known Allergies    Social Hx: retired.   reports that she has never smoked. She has never used smokeless tobacco. She reports current alcohol use of about 1.7 standard drinks of alcohol per week. She reports that she does not use drugs.    Family Hx: family history includes Cerebrovascular Disease in her maternal grandfather; Depression/Anxiety in her mother; Hypertension in her father and mother.    REVIEW OF SYSTEMS: 10 point ROS neg other than the symptoms noted above in the HPI and PMH. Notables include  CONSTITUTIONAL:NEGATIVE for fever, chills, change in weight  INTEGUMENTARY/SKIN: NEGATIVE for worrisome rashes, moles or lesions  MUSCULOSKELETAL:See HPI above  NEURO: NEGATIVE for weakness, dizziness or paresthesias    PHYSICAL EXAM:  /76   Pulse 81   Ht 1.651 m (5' 5\")   Wt 76.4 kg (168 lb 8 oz)   LMP 11/01/2007   BMI 28.04 kg/m      GENERAL APPEARANCE: healthy, alert, no distress  SKIN: no suspicious lesions or rashes  NEURO: Normal strength and tone, mentation intact and speech normal  PSYCH:  mentation appears normal and affect normal, not anxious  RESPIRATORY: No increased work of breathing.  HANDS: no clubbing, nail pitting  LYMPH: no palpable popliteal lymphadenopathy.    BILATERAL LOWER EXTREMITIES:  Gait: normal  Alignment: neutral right, slight valgus left.  No gross deformities or masses.  No Quad atrophy, strength normal.  Intact sensation deep " peroneal nerve, superficial peroneal nerve, med/lat tibial nerve, sural nerve, saphenous nerve  Intact EHL, EDL, TA, FHL, GS, quadriceps hamstrings and hip flexors  Toes warm and well perfused, brisk capillary refill. Palpable 2+ dp pulses.  Bilateral calf soft and nttp or squeeze.  DTRs: achilles 2+, patella 2+.  Edema: trace  Bilateral varicose/spider veins    LEFT KNEE EXAM:    Skin: intact, no ecchymosis or erythema  ROM: 1 degrees hyperextension to 125+ flexion  Tight hamstrings on straight leg raise.  Effusion: none  Tender: pes, medial joint line,  tender to palpation lat joint line, anterior or posterior knee  McMurrays: negative    MCL: stable, and non-painful at both 0 and 30 degrees knee flexion  Varus stress: stable, and non-painful at both 0 and 30 degrees knee flexion  Lachmans: neg, firm endpoint  Posterior Drawer stable  Patellofemoral joint:                Apprehension: negative              Crepitations: mild   Grind: negative.    RIGHT KNEE EXAM:    Skin: intact, no ecchymosis or erythema  Squat: 50 %, limited by pain.     ROM: 1 extension to 115 flexion  Tight hamstrings on straight leg raise.  Effusion: small  Tender: med/lat joint line, pes.  McMurrays: negative    MCL: stable, and non-painful at both 0 and 30 degrees knee flexion  Varus stress: stable, and non-painful at both 0 and 30 degrees knee flexion  Lachmans: neg, firm endpoint  Posterior Drawer stable  Patellofemoral joint:                Apprehension: negative              Crepitations: mild   Grind: positive.    X-RAY: sunrise view right knee 11/11/2019 reviewed. No obvious fracture or dislocation. No obvious bony abnormality or lesion. Mild patello-femoral degenerative changes.    2 views right knee from 4/3/2018 were reviewed in clinic today. On my review, no obvious fractures or dislocations. Mild medial narrowing.      MRI:  MRI right knee from 10/29/2019 was reviewed in clinic today.    1. Degenerative arthrosis with regions of  grade 3-4 chondromalacia  involving the medial femoral condyle, lateral compartment, and  patellar medial facet.  2. Medial meniscus posterior horn degeneration/mechanical erosion  without apparent osmel tear or displaced meniscal flap.  3. Lateral meniscus anterior horn complete mechanical  erosion/degeneration.  4. Joint effusion and popliteal cyst.       ASSESSMENT/PLAN: Carlos Mayo is a 59 year old female with chronic right knee pain, advanced primary osteoarthritis.    * unfortunately, weight bearing xrays were not obtained today, but based on MRI, would suggest advanced tricompartmental osteoarthritis.    Discussed typical symptoms of arthritic pain is pain aggravated with weight bearing activities, stiffness, relieved by sitting or rest. Swelling may be associated. It is common to have some grinding and popping in the knee with arthritis.    Discussed various treatments for degenerative arthrosis of the knee, including nonoperative and operative approaches. Nonoperative approaches, which are exhausted prior to operative treatment, include doing nothing and living with the pain as patient has been doing, activity modification (avoid aggravating activities), physical therapy and strengthening exercises, weight loss, anti-inflammatory medications, bracing, ambulatory aids (cane, walker) and injections. Once these have been tried and are deemed unsuccessful with a good effort, and the patient is an appropriate candidate, the next treatment would be knee arthroplasty or replacement. Depending on the location of the arthritis, knee replacement can be partial (one side of the knee affected by arthritis) or a total knee arthroplasty (all 3 compartments). The risks, perceived benefits and perioperative rehabilitation expectations of knee arthroplasty were discussed in detail. Also discussed that approximately 10% of patients that undergo knee arthroplasty are not happy following surgery and may have worse pain or  "no improvement in pain, contrary to their preoperative expectations.    At this time, nonoperative treatment will be pursued.     Non-surgical treatment for knee arthritis includes:    * rest, sitting  * Activity modification - avoid impact activities or activities that aggravate symptoms.  * NSAIDS (non-steroidal anti-inflammatory medications; e.g. Aleve, advil, motrin, ibuprofen) - regular use for inflammation ( twice daily or three times daily), with food, as long as no contra-indications Please discuss with primary care doctor if needed  * ice, 15-20 minutes at a time several times a day or as needed.  * Strengthening of quadriceps muscles  * Physical Therapy for strengthening, stretching and range of motion exercises of legs  * Tylenol as needed for pain, consider Tylenol arthritis or similar  * Weight loss: Weight loss:  Body mass index is 28.04 kg/m .. weight loss benefits, not only for the current pain symptoms, but also overall health. Recommend a good diet plan that works for the patient, with the assistance of a dietician or primary care doctor as needed. Also, a good, low-impact exercise program for at least 20 minutes per day, 3 times per week, such as exercise bike, elliptical , or pool.  * Exercise: low impact such as stationary bike, elliptical, pool.  * Injections: cortisone versus viscosupplementation (hyaluronic acid, \"rooster comb\", \"gel shots\"); risks and perceived benefits discussed today. Patient elects NOT to proceed.  * Bracing: bracing the knee may offer some relief of symptoms when worn and provide some stability.  * over the counter supplements such as glucosamine and chondroitin sulfate may help with joint pain.  * topical ointments may help as well    * return to clinic as needed. Please obtain 3 weight bearing views right knee at next visit to monitor progression of osteoarthritis on xrays.          Amanuel Narayan M.D., M.S.  Dept. of Orthopaedic Surgery  [facility] " Services          Again, thank you for allowing me to participate in the care of your patient.        Sincerely,        Amanuel Narayan MD

## 2019-11-11 NOTE — PROGRESS NOTES
CHIEF COMPLAINT:   Chief Complaint   Patient presents with     Right Knee - Pain     Onset: 2 years. NKI. Pain has remained about the same. She notes she fell 3 years ago, broke her arm but didn't have any knee pain. Pain is medial and lateral. Pain with stairs, kneeling, getting up from a chair. She has done physical therapy.    .    Carlos Mayo is seen today in the [facility] for evaluation of right knee pain at the request of Dr. Mayelin Briggs MD    HISTORY OF PRESENT ILLNESS    Carlos Mayo is a 59 year old female seen for evaluation of ongoing right knee pain with no known injury.   Pain has been present for at least 2 years. No known injury. Locates along the outside/back of the knee (sharp) and the inner aspect (burning pain). Aggravated with sit to stand, going down stairs, getting up off the floor, walking. Ok at rest with occasional pain at night. Denies hip or low back pain, numbness and tingling. Treatment with Physical Therapy over a year ago, knee sleeve. Thought maybe was due to varicose veins but states she saw a vein doctor recently and told her veins were fine. She's not had injections nor take any pain medications.    Pain severity: 5/10      Other PMH:  has a past medical history of Esophageal reflux, Palpitations, and Unspecified acute reaction to stress.  Patient Active Problem List   Diagnosis     PALPITATIONS - rare     Cervical stenosis (uterine cervix)     CARDIOVASCULAR SCREENING; LDL GOAL LESS THAN 160     24 hr info given     Advanced directives, counseling/discussion       Surgical Hx:  has a past surgical history that includes surgical history of -  (1980); Open reduction internal fixation wrist (Right, 9/27/2016); Remove hardware wrist (Right, 9/14/2017); and colonoscopy (09/14/2009).    Medications:   Current Outpatient Medications:      acetaminophen (TYLENOL) 325 MG tablet, Take 2 tablets (650 mg) by mouth every 6 hours (Patient not taking: Reported  "on 10/23/2019), Disp: 100 tablet, Rfl: 0     Ascorbic Acid (VITAMIN C PO), , Disp: , Rfl:      Cholecalciferol (VITAMIN D) 2000 UNITS CAPS, , Disp: , Rfl:      vitamin  B complex with vitamin C (VITAMIN  B COMPLEX) TABS, Take 1 tablet by mouth daily, Disp: , Rfl:     Allergies: No Known Allergies    Social Hx: retired.   reports that she has never smoked. She has never used smokeless tobacco. She reports current alcohol use of about 1.7 standard drinks of alcohol per week. She reports that she does not use drugs.    Family Hx: family history includes Cerebrovascular Disease in her maternal grandfather; Depression/Anxiety in her mother; Hypertension in her father and mother.    REVIEW OF SYSTEMS: 10 point ROS neg other than the symptoms noted above in the HPI and PMH. Notables include  CONSTITUTIONAL:NEGATIVE for fever, chills, change in weight  INTEGUMENTARY/SKIN: NEGATIVE for worrisome rashes, moles or lesions  MUSCULOSKELETAL:See HPI above  NEURO: NEGATIVE for weakness, dizziness or paresthesias    PHYSICAL EXAM:  /76   Pulse 81   Ht 1.651 m (5' 5\")   Wt 76.4 kg (168 lb 8 oz)   LMP 11/01/2007   BMI 28.04 kg/m     GENERAL APPEARANCE: healthy, alert, no distress  SKIN: no suspicious lesions or rashes  NEURO: Normal strength and tone, mentation intact and speech normal  PSYCH:  mentation appears normal and affect normal, not anxious  RESPIRATORY: No increased work of breathing.  HANDS: no clubbing, nail pitting  LYMPH: no palpable popliteal lymphadenopathy.    BILATERAL LOWER EXTREMITIES:  Gait: normal  Alignment: neutral right, slight valgus left.  No gross deformities or masses.  No Quad atrophy, strength normal.  Intact sensation deep peroneal nerve, superficial peroneal nerve, med/lat tibial nerve, sural nerve, saphenous nerve  Intact EHL, EDL, TA, FHL, GS, quadriceps hamstrings and hip flexors  Toes warm and well perfused, brisk capillary refill. Palpable 2+ dp pulses.  Bilateral calf soft and nttp or " squeeze.  DTRs: achilles 2+, patella 2+.  Edema: trace  Bilateral varicose/spider veins    LEFT KNEE EXAM:    Skin: intact, no ecchymosis or erythema  ROM: 1 degrees hyperextension to 125+ flexion  Tight hamstrings on straight leg raise.  Effusion: none  Tender: pes, medial joint line,  tender to palpation lat joint line, anterior or posterior knee  McMurrays: negative    MCL: stable, and non-painful at both 0 and 30 degrees knee flexion  Varus stress: stable, and non-painful at both 0 and 30 degrees knee flexion  Lachmans: neg, firm endpoint  Posterior Drawer stable  Patellofemoral joint:                Apprehension: negative              Crepitations: mild   Grind: negative.    RIGHT KNEE EXAM:    Skin: intact, no ecchymosis or erythema  Squat: 50 %, limited by pain.     ROM: 1 extension to 115 flexion  Tight hamstrings on straight leg raise.  Effusion: small  Tender: med/lat joint line, pes.  McMurrays: negative    MCL: stable, and non-painful at both 0 and 30 degrees knee flexion  Varus stress: stable, and non-painful at both 0 and 30 degrees knee flexion  Lachmans: neg, firm endpoint  Posterior Drawer stable  Patellofemoral joint:                Apprehension: negative              Crepitations: mild   Grind: positive.    X-RAY: sunrise view right knee 11/11/2019 reviewed. No obvious fracture or dislocation. No obvious bony abnormality or lesion. Mild patello-femoral degenerative changes.    2 views right knee from 4/3/2018 were reviewed in clinic today. On my review, no obvious fractures or dislocations. Mild medial narrowing.      MRI:  MRI right knee from 10/29/2019 was reviewed in clinic today.    1. Degenerative arthrosis with regions of grade 3-4 chondromalacia  involving the medial femoral condyle, lateral compartment, and  patellar medial facet.  2. Medial meniscus posterior horn degeneration/mechanical erosion  without apparent osmel tear or displaced meniscal flap.  3. Lateral meniscus anterior horn  complete mechanical  erosion/degeneration.  4. Joint effusion and popliteal cyst.       ASSESSMENT/PLAN: Carlos Mayo is a 59 year old female with chronic right knee pain, advanced primary osteoarthritis.    * unfortunately, weight bearing xrays were not obtained today, but based on MRI, would suggest advanced tricompartmental osteoarthritis.    Discussed typical symptoms of arthritic pain is pain aggravated with weight bearing activities, stiffness, relieved by sitting or rest. Swelling may be associated. It is common to have some grinding and popping in the knee with arthritis.    Discussed various treatments for degenerative arthrosis of the knee, including nonoperative and operative approaches. Nonoperative approaches, which are exhausted prior to operative treatment, include doing nothing and living with the pain as patient has been doing, activity modification (avoid aggravating activities), physical therapy and strengthening exercises, weight loss, anti-inflammatory medications, bracing, ambulatory aids (cane, walker) and injections. Once these have been tried and are deemed unsuccessful with a good effort, and the patient is an appropriate candidate, the next treatment would be knee arthroplasty or replacement. Depending on the location of the arthritis, knee replacement can be partial (one side of the knee affected by arthritis) or a total knee arthroplasty (all 3 compartments). The risks, perceived benefits and perioperative rehabilitation expectations of knee arthroplasty were discussed in detail. Also discussed that approximately 10% of patients that undergo knee arthroplasty are not happy following surgery and may have worse pain or no improvement in pain, contrary to their preoperative expectations.    At this time, nonoperative treatment will be pursued.     Non-surgical treatment for knee arthritis includes:    * rest, sitting  * Activity modification - avoid impact activities or activities that  "aggravate symptoms.  * NSAIDS (non-steroidal anti-inflammatory medications; e.g. Aleve, advil, motrin, ibuprofen) - regular use for inflammation ( twice daily or three times daily), with food, as long as no contra-indications Please discuss with primary care doctor if needed  * ice, 15-20 minutes at a time several times a day or as needed.  * Strengthening of quadriceps muscles  * Physical Therapy for strengthening, stretching and range of motion exercises of legs  * Tylenol as needed for pain, consider Tylenol arthritis or similar  * Weight loss: Weight loss:  Body mass index is 28.04 kg/m .. weight loss benefits, not only for the current pain symptoms, but also overall health. Recommend a good diet plan that works for the patient, with the assistance of a dietician or primary care doctor as needed. Also, a good, low-impact exercise program for at least 20 minutes per day, 3 times per week, such as exercise bike, elliptical , or pool.  * Exercise: low impact such as stationary bike, elliptical, pool.  * Injections: cortisone versus viscosupplementation (hyaluronic acid, \"rooster comb\", \"gel shots\"); risks and perceived benefits discussed today. Patient elects NOT to proceed.  * Bracing: bracing the knee may offer some relief of symptoms when worn and provide some stability.  * over the counter supplements such as glucosamine and chondroitin sulfate may help with joint pain.  * topical ointments may help as well    * return to clinic as needed. Please obtain 3 weight bearing views right knee at next visit to monitor progression of osteoarthritis on xrays.          Amanuel Narayan M.D., M.S.  Dept. of Orthopaedic Surgery  [facility]        "

## 2019-11-22 ENCOUNTER — THERAPY VISIT (OUTPATIENT)
Dept: PHYSICAL THERAPY | Facility: CLINIC | Age: 59
End: 2019-11-22
Attending: ORTHOPAEDIC SURGERY
Payer: COMMERCIAL

## 2019-11-22 DIAGNOSIS — M25.561 CHRONIC PAIN OF RIGHT KNEE: ICD-10-CM

## 2019-11-22 DIAGNOSIS — G89.29 CHRONIC PAIN OF RIGHT KNEE: ICD-10-CM

## 2019-11-22 DIAGNOSIS — M17.11 PRIMARY OSTEOARTHRITIS OF RIGHT KNEE: ICD-10-CM

## 2019-11-22 DIAGNOSIS — M25.561 KNEE PAIN, RIGHT: ICD-10-CM

## 2019-11-22 PROCEDURE — 97161 PT EVAL LOW COMPLEX 20 MIN: CPT | Mod: GP | Performed by: PHYSICAL THERAPIST

## 2019-11-22 PROCEDURE — 97110 THERAPEUTIC EXERCISES: CPT | Mod: GP | Performed by: PHYSICAL THERAPIST

## 2019-11-22 ASSESSMENT — ACTIVITIES OF DAILY LIVING (ADL)
GO UP STAIRS: ACTIVITY IS MINIMALLY DIFFICULT
SQUAT: ACTIVITY IS VERY DIFFICULT
SWELLING: I HAVE THE SYMPTOM BUT IT DOES NOT AFFECT MY ACTIVITY
WEAKNESS: THE SYMPTOM AFFECTS MY ACTIVITY MODERATELY
HOW_WOULD_YOU_RATE_THE_CURRENT_FUNCTION_OF_YOUR_KNEE_DURING_YOUR_USUAL_DAILY_ACTIVITIES_ON_A_SCALE_FROM_0_TO_100_WITH_100_BEING_YOUR_LEVEL_OF_KNEE_FUNCTION_PRIOR_TO_YOUR_INJURY_AND_0_BEING_THE_INABILITY_TO_PERFORM_ANY_OF_YOUR_USUAL_DAILY_ACTIVITIES?: 75
KNEE_ACTIVITY_OF_DAILY_LIVING_SCORE: 52.86
WALK: ACTIVITY IS SOMEWHAT DIFFICULT
STAND: ACTIVITY IS SOMEWHAT DIFFICULT
KNEEL ON THE FRONT OF YOUR KNEE: ACTIVITY IS VERY DIFFICULT
PAIN: THE SYMPTOM AFFECTS MY ACTIVITY MODERATELY
GIVING WAY, BUCKLING OR SHIFTING OF KNEE: THE SYMPTOM AFFECTS MY ACTIVITY SLIGHTLY
GO DOWN STAIRS: ACTIVITY IS FAIRLY DIFFICULT
KNEE_ACTIVITY_OF_DAILY_LIVING_SUM: 37
LIMPING: THE SYMPTOM AFFECTS MY ACTIVITY SLIGHTLY
AS_A_RESULT_OF_YOUR_KNEE_INJURY,_HOW_WOULD_YOU_RATE_YOUR_CURRENT_LEVEL_OF_DAILY_ACTIVITY?: ABNORMAL
SIT WITH YOUR KNEE BENT: ACTIVITY IS SOMEWHAT DIFFICULT
RAW_SCORE: 37
STIFFNESS: I HAVE THE SYMPTOM BUT IT DOES NOT AFFECT MY ACTIVITY
HOW_WOULD_YOU_RATE_THE_OVERALL_FUNCTION_OF_YOUR_KNEE_DURING_YOUR_USUAL_DAILY_ACTIVITIES?: ABNORMAL
RISE FROM A CHAIR: ACTIVITY IS FAIRLY DIFFICULT

## 2019-11-22 NOTE — PROGRESS NOTES
[facility] Initial Evaluation  Subjective:  The history is provided by the patient.   Type of problem:  Right knee    This is a chronic condition   Problem details: Pt reports a 2 year history of R knee pain. Not sure what caused onset of pain initially, reports she stood up from desk one day and noticed the pain. Since then pain has waxed and waned. Currently locates pain in R posterolateral. Also notes some pain and swelling in anteromedial aspect of R knee. Describes post/lat knee pain as sharp and ant/med pain as burning. Pain worse with getting up from floor (playing with grandkids), going down stairs (different pain intensity each day), standing long periods of time (15 min), standing from chair after sitting for long periods of time. Pain better with gentle ROM, movement, some OTC on occasion. Had MRI of R knee, shows general degenerative changes. Pt is retired..             and reported as 6/10 on pain scale. General health as reported by patient is good. Pertinent medical history includes:  Menopausal.     Other surgery history details: Broken wrist R.                Patient is Retired.                           Objective:  System                                           Hip Evaluation    Hip Strength:    Flexion:   Left: 4+/5   Pain:  Right: 4+/5   Pain:                    Extension:  Left: 4-/5  Pain:Right: 4-/5    Pain:    Abduction:  Left: 4/5     Pain:Right: 4/5    Pain:        Knee Flexion:  Left: 4+/5   Pain:Right: 4+/5   Pain:  Knee Extension:  Left: 4+/5   Pain:Right: 4+/5    Pain:                 Knee Evaluation:  ROM:    AROM    Hyperextension: Left:  5    Right:  Extension:  Left: 0    Right:  2  Flexion: Left: 140    Right: 125  PROM        Flexion: Left: 145    Right:  128          Special Tests: Special test for knee: Ant/med knee pain positive for meniscus.    Right knee positive for the following tests:  Meniscal  Right knee negative for the following special tests:   Patellar Compression  Palpation:      Right knee tenderness present at:  Medial Joint Line and Biceps Femoral      Functional Testing:          Quad:    Single Leg Squat:  Left:      Right:        Bilateral Leg Squat:   Mild loss of control, femoral IR and excessive anterior knee excursion                  General Evaluation:                      Balance:    Single Leg Stance--Eyes Open:  Left: 0/30 sec    Right: 0/30 sec  Single Leg Stance--Eyes Closed:  Left: 3/30 sec    Right: 3/30 sec                                                   ROS    Assessment/Plan:    Patient is a 59 year old female with right side knee complaints.    Patient has the following significant findings with corresponding treatment plan.                Diagnosis 1:  Anteromedial meniscus irritation  Pain -  hot/cold therapy, US, electric stimulation, mechanical traction, manual therapy, splint/taping/bracing/orthotics, self management, education, directional preference exercise and home program  Decreased ROM/flexibility - manual therapy, therapeutic exercise, therapeutic activity and home program  Decreased joint mobility - manual therapy, therapeutic exercise, therapeutic activity and home program  Decreased strength - therapeutic exercise, therapeutic activities and home program  Impaired muscle performance - neuro re-education and home program  Decreased function - therapeutic activities and home program  Diagnosis 2:  R lateral HS tedonopathy   Pain -  hot/cold therapy, US, electric stimulation, mechanical traction, manual therapy, splint/taping/bracing/orthotics, self management, education, directional preference exercise and home program  Decreased ROM/flexibility - manual therapy, therapeutic exercise, therapeutic activity and home program  Decreased joint mobility - manual therapy, therapeutic exercise, therapeutic activity and home program  Decreased strength - therapeutic exercise, therapeutic activities and home program  Impaired  muscle performance - neuro re-education and home program  Decreased function - therapeutic activities and home program    Therapy Evaluation Codes:   1) History comprised of:   Personal factors that impact the plan of care:      None.    Comorbidity factors that impact the plan of care are:      Menopausal.     Medications impacting care: None.  2) Examination of Body Systems comprised of:   Body structures and functions that impact the plan of care:      Knee.   Activity limitations that impact the plan of care are:      Bending, Lifting, Squatting/kneeling, Stairs, Standing and Walking.  3) Clinical presentation characteristics are:   Stable/Uncomplicated.  4) Decision-Making    Low complexity using standardized patient assessment instrument and/or measureable assessment of functional outcome.  Cumulative Therapy Evaluation is: Low complexity.    Previous and current functional limitations:  (See Goal Flow Sheet for this information)    Short term and Long term goals: (See Goal Flow Sheet for this information)     Communication ability:  Patient appears to be able to clearly communicate and understand verbal and written communication and follow directions correctly.  Treatment Explanation - The following has been discussed with the patient:   RX ordered/plan of care  Anticipated outcomes  Possible risks and side effects  This patient would benefit from PT intervention to resume normal activities.   Rehab potential is good.    Frequency:  1 X week, once daily  Duration:  for 6 weeks  Discharge Plan:  Achieve all LTG.  Independent in home treatment program.  Reach maximal therapeutic benefit.    Please refer to the daily flowsheet for treatment today, total treatment time and time spent performing 1:1 timed codes.

## 2019-11-29 ENCOUNTER — THERAPY VISIT (OUTPATIENT)
Dept: PHYSICAL THERAPY | Facility: CLINIC | Age: 59
End: 2019-11-29
Payer: COMMERCIAL

## 2019-11-29 DIAGNOSIS — M25.561 KNEE PAIN, RIGHT: ICD-10-CM

## 2019-11-29 PROCEDURE — 97110 THERAPEUTIC EXERCISES: CPT | Mod: GP | Performed by: PHYSICAL THERAPIST

## 2019-11-29 PROCEDURE — 97140 MANUAL THERAPY 1/> REGIONS: CPT | Mod: GP | Performed by: PHYSICAL THERAPIST

## 2019-11-29 PROCEDURE — 97112 NEUROMUSCULAR REEDUCATION: CPT | Mod: GP | Performed by: PHYSICAL THERAPIST

## 2019-12-04 ENCOUNTER — THERAPY VISIT (OUTPATIENT)
Dept: PHYSICAL THERAPY | Facility: CLINIC | Age: 59
End: 2019-12-04
Payer: COMMERCIAL

## 2019-12-04 DIAGNOSIS — M25.561 KNEE PAIN, RIGHT: ICD-10-CM

## 2019-12-04 PROCEDURE — 97112 NEUROMUSCULAR REEDUCATION: CPT | Mod: GP | Performed by: PHYSICAL THERAPIST

## 2019-12-04 PROCEDURE — 97140 MANUAL THERAPY 1/> REGIONS: CPT | Mod: GP | Performed by: PHYSICAL THERAPIST

## 2019-12-04 PROCEDURE — 97110 THERAPEUTIC EXERCISES: CPT | Mod: GP | Performed by: PHYSICAL THERAPIST

## 2019-12-18 ENCOUNTER — THERAPY VISIT (OUTPATIENT)
Dept: PHYSICAL THERAPY | Facility: CLINIC | Age: 59
End: 2019-12-18
Payer: COMMERCIAL

## 2019-12-18 DIAGNOSIS — M25.561 KNEE PAIN, RIGHT: ICD-10-CM

## 2019-12-18 PROCEDURE — 97110 THERAPEUTIC EXERCISES: CPT | Mod: GP | Performed by: PHYSICAL THERAPIST

## 2019-12-18 PROCEDURE — 97140 MANUAL THERAPY 1/> REGIONS: CPT | Mod: GP | Performed by: PHYSICAL THERAPIST

## 2019-12-18 ASSESSMENT — ACTIVITIES OF DAILY LIVING (ADL)
KNEE_ACTIVITY_OF_DAILY_LIVING_SUM: 52
PAIN: THE SYMPTOM AFFECTS MY ACTIVITY SLIGHTLY
SIT WITH YOUR KNEE BENT: ACTIVITY IS MINIMALLY DIFFICULT
STAND: ACTIVITY IS MINIMALLY DIFFICULT
GIVING WAY, BUCKLING OR SHIFTING OF KNEE: I HAVE THE SYMPTOM BUT IT DOES NOT AFFECT MY ACTIVITY
HOW_WOULD_YOU_RATE_THE_OVERALL_FUNCTION_OF_YOUR_KNEE_DURING_YOUR_USUAL_DAILY_ACTIVITIES?: NEARLY NORMAL
SWELLING: I HAVE THE SYMPTOM BUT IT DOES NOT AFFECT MY ACTIVITY
WALK: ACTIVITY IS MINIMALLY DIFFICULT
LIMPING: I HAVE THE SYMPTOM BUT IT DOES NOT AFFECT MY ACTIVITY
STIFFNESS: I HAVE THE SYMPTOM BUT IT DOES NOT AFFECT MY ACTIVITY
SQUAT: ACTIVITY IS SOMEWHAT DIFFICULT
RAW_SCORE: 52
RISE FROM A CHAIR: ACTIVITY IS MINIMALLY DIFFICULT
GO DOWN STAIRS: ACTIVITY IS SOMEWHAT DIFFICULT
HOW_WOULD_YOU_RATE_THE_CURRENT_FUNCTION_OF_YOUR_KNEE_DURING_YOUR_USUAL_DAILY_ACTIVITIES_ON_A_SCALE_FROM_0_TO_100_WITH_100_BEING_YOUR_LEVEL_OF_KNEE_FUNCTION_PRIOR_TO_YOUR_INJURY_AND_0_BEING_THE_INABILITY_TO_PERFORM_ANY_OF_YOUR_USUAL_DAILY_ACTIVITIES?: 90
WEAKNESS: I HAVE THE SYMPTOM BUT IT DOES NOT AFFECT MY ACTIVITY
KNEEL ON THE FRONT OF YOUR KNEE: ACTIVITY IS SOMEWHAT DIFFICULT
GO UP STAIRS: ACTIVITY IS MINIMALLY DIFFICULT
KNEE_ACTIVITY_OF_DAILY_LIVING_SCORE: 74.29
AS_A_RESULT_OF_YOUR_KNEE_INJURY,_HOW_WOULD_YOU_RATE_YOUR_CURRENT_LEVEL_OF_DAILY_ACTIVITY?: NEARLY NORMAL

## 2019-12-31 ENCOUNTER — THERAPY VISIT (OUTPATIENT)
Dept: PHYSICAL THERAPY | Facility: CLINIC | Age: 59
End: 2019-12-31
Payer: COMMERCIAL

## 2019-12-31 DIAGNOSIS — M25.561 KNEE PAIN, RIGHT: ICD-10-CM

## 2019-12-31 PROCEDURE — 97110 THERAPEUTIC EXERCISES: CPT | Mod: GP | Performed by: PHYSICAL THERAPIST

## 2019-12-31 PROCEDURE — 97112 NEUROMUSCULAR REEDUCATION: CPT | Mod: GP | Performed by: PHYSICAL THERAPIST

## 2019-12-31 PROCEDURE — 97140 MANUAL THERAPY 1/> REGIONS: CPT | Mod: GP | Performed by: PHYSICAL THERAPIST

## 2020-01-15 ENCOUNTER — THERAPY VISIT (OUTPATIENT)
Dept: PHYSICAL THERAPY | Facility: CLINIC | Age: 60
End: 2020-01-15
Payer: COMMERCIAL

## 2020-01-15 DIAGNOSIS — M25.561 KNEE PAIN, RIGHT: ICD-10-CM

## 2020-01-15 PROCEDURE — 97530 THERAPEUTIC ACTIVITIES: CPT | Mod: GP | Performed by: PHYSICAL THERAPIST

## 2020-01-15 PROCEDURE — 97110 THERAPEUTIC EXERCISES: CPT | Mod: GP | Performed by: PHYSICAL THERAPIST

## 2020-01-15 PROCEDURE — 97140 MANUAL THERAPY 1/> REGIONS: CPT | Mod: GP | Performed by: PHYSICAL THERAPIST

## 2020-01-15 ASSESSMENT — ACTIVITIES OF DAILY LIVING (ADL)
WEAKNESS: I HAVE THE SYMPTOM BUT IT DOES NOT AFFECT MY ACTIVITY
AS_A_RESULT_OF_YOUR_KNEE_INJURY,_HOW_WOULD_YOU_RATE_YOUR_CURRENT_LEVEL_OF_DAILY_ACTIVITY?: NEARLY NORMAL
SWELLING: I HAVE THE SYMPTOM BUT IT DOES NOT AFFECT MY ACTIVITY
RAW_SCORE: 58
WALK: ACTIVITY IS MINIMALLY DIFFICULT
STAND: ACTIVITY IS MINIMALLY DIFFICULT
HOW_WOULD_YOU_RATE_THE_CURRENT_FUNCTION_OF_YOUR_KNEE_DURING_YOUR_USUAL_DAILY_ACTIVITIES_ON_A_SCALE_FROM_0_TO_100_WITH_100_BEING_YOUR_LEVEL_OF_KNEE_FUNCTION_PRIOR_TO_YOUR_INJURY_AND_0_BEING_THE_INABILITY_TO_PERFORM_ANY_OF_YOUR_USUAL_DAILY_ACTIVITIES?: 90
SIT WITH YOUR KNEE BENT: ACTIVITY IS MINIMALLY DIFFICULT
RISE FROM A CHAIR: ACTIVITY IS NOT DIFFICULT
KNEEL ON THE FRONT OF YOUR KNEE: ACTIVITY IS MINIMALLY DIFFICULT
KNEE_ACTIVITY_OF_DAILY_LIVING_SUM: 58
GO DOWN STAIRS: ACTIVITY IS MINIMALLY DIFFICULT
PAIN: I HAVE THE SYMPTOM BUT IT DOES NOT AFFECT MY ACTIVITY
LIMPING: I HAVE THE SYMPTOM BUT IT DOES NOT AFFECT MY ACTIVITY
GIVING WAY, BUCKLING OR SHIFTING OF KNEE: I DO NOT HAVE THE SYMPTOM
STIFFNESS: I HAVE THE SYMPTOM BUT IT DOES NOT AFFECT MY ACTIVITY
SQUAT: ACTIVITY IS SOMEWHAT DIFFICULT
GO UP STAIRS: ACTIVITY IS NOT DIFFICULT
KNEE_ACTIVITY_OF_DAILY_LIVING_SCORE: 82.86
HOW_WOULD_YOU_RATE_THE_OVERALL_FUNCTION_OF_YOUR_KNEE_DURING_YOUR_USUAL_DAILY_ACTIVITIES?: NEARLY NORMAL

## 2020-03-15 ENCOUNTER — HEALTH MAINTENANCE LETTER (OUTPATIENT)
Age: 60
End: 2020-03-15

## 2020-03-25 PROBLEM — M25.561 KNEE PAIN, RIGHT: Status: RESOLVED | Noted: 2019-11-22 | Resolved: 2020-03-25

## 2020-03-25 NOTE — PROGRESS NOTES
"Discharge Note    Progress reporting period is from initial eval to Crzu 15, 2020.     Carlos failed to return for next follow up visit and current status is unknown.  Please see information below for last relevant information on current status.  Patient seen for 6 visits.    SUBJECTIVE  Subjective changes noted by patient:  Pt doing much better since last visit. Feels about 90% improved overall from IE. Still has mild pain with doing stairs, but feels the amout of pain she gets with stairs, and other ADLs in general, is at an acceptable level right now. Feels comfortable continuing with HEP currently to help manage her sxs. Has started going to gym as well.  .  Current pain level is 2/10.     Previous pain level was  6/10.   Changes in function:  Yes (See Goal flowsheet attached for changes in current functional level)  Adverse reaction to treatment or activity: None    OBJECTIVE  Changes noted in objective findings: Needs many cues for SL step down with RLE on 6\" step     ASSESSMENT/PLAN  Diagnosis: R hamstring tendonosis   DIAGP:  The encounter diagnosis was Knee pain, right.  STG/LTGs have been met or progress has been made towards goals:  Yes, please see goal flowsheet for most current information  Assessment of Progress: current status is unknown.    Last current status: Pt is progressing well   Self Management Plans:  HEP  I have re-evaluated this patient and find that the nature, scope, duration and intensity of the therapy is appropriate for the medical condition of the patient.  Carlos continues to require the following intervention to meet STG and LTG's:  HEP.    Recommendations:  Discharge with current home program.  Patient to follow up with MD as needed.    Please refer to the daily flowsheet for treatment today, total treatment time and time spent performing 1:1 timed codes.      "

## 2021-01-10 ENCOUNTER — HEALTH MAINTENANCE LETTER (OUTPATIENT)
Age: 61
End: 2021-01-10

## 2021-04-20 ENCOUNTER — MYC MEDICAL ADVICE (OUTPATIENT)
Dept: FAMILY MEDICINE | Facility: CLINIC | Age: 61
End: 2021-04-20

## 2021-04-20 NOTE — TELEPHONE ENCOUNTER
Patient notified and voiced understanding and agreement.  Radha Barajas RN  [facility]

## 2021-04-20 NOTE — TELEPHONE ENCOUNTER
I called and spoke to patient    She says she has had pain to left ribs at bra line, radiates to area under shoulder blade on the left.    Started after shoveling this winter, resolved.   Occurred again this spring after raking.   Says there is no pain to the breast itself or in the axillary region, hurts in rib area lower on the left.    Denies pain with deep breath, radiating down arm, blurry vision, dizziness.  Says it hurts on the left side when she pushes, bra strap pressure is irritating.    Advised she keep the appointment whether or not she hears back from us; will send to PCP to address and radiology can check for orders if we did not call her back.    Sounds like routine mammogram will be done unless Dr. Briggs advises otherwise.   Patient does not think the mammogram will cause her pain.    Routed to Dr. Briggs to address, any need to change to diagnostic mammogram due to left lateral chest/rib pain?    Vera Vasquez RN  [facility]

## 2021-04-20 NOTE — TELEPHONE ENCOUNTER
To me, this sounds like rib pain. As long as the breast itself isn't tender, a screening mammogram is what I'd recommend. Please update patient. Thank you.

## 2021-04-21 ENCOUNTER — HOSPITAL ENCOUNTER (OUTPATIENT)
Dept: MAMMOGRAPHY | Facility: CLINIC | Age: 61
Discharge: HOME | End: 2021-04-21
Attending: FAMILY MEDICINE | Admitting: FAMILY MEDICINE
Payer: COMMERCIAL

## 2021-04-21 DIAGNOSIS — Z12.31 VISIT FOR SCREENING MAMMOGRAM: ICD-10-CM

## 2021-04-21 PROCEDURE — 77063 BREAST TOMOSYNTHESIS BI: CPT

## 2021-04-28 ENCOUNTER — IMMUNIZATION (OUTPATIENT)
Dept: FAMILY MEDICINE | Facility: CLINIC | Age: 61
End: 2021-04-28
Payer: COMMERCIAL

## 2021-04-28 PROCEDURE — 0011A PR COVID VAC MODERNA 100 MCG/0.5 ML IM: CPT

## 2021-04-28 PROCEDURE — 91301 PR COVID VAC MODERNA 100 MCG/0.5 ML IM: CPT

## 2021-05-26 ENCOUNTER — IMMUNIZATION (OUTPATIENT)
Dept: FAMILY MEDICINE | Facility: CLINIC | Age: 61
End: 2021-05-26
Attending: FAMILY MEDICINE
Payer: COMMERCIAL

## 2021-05-26 PROCEDURE — 91301 PR COVID VAC MODERNA 100 MCG/0.5 ML IM: CPT

## 2021-05-26 PROCEDURE — 0012A PR COVID VAC MODERNA 100 MCG/0.5 ML IM: CPT

## 2021-07-01 ENCOUNTER — OFFICE VISIT (OUTPATIENT)
Dept: FAMILY MEDICINE | Facility: CLINIC | Age: 61
End: 2021-07-01
Payer: COMMERCIAL

## 2021-07-01 VITALS
DIASTOLIC BLOOD PRESSURE: 74 MMHG | SYSTOLIC BLOOD PRESSURE: 128 MMHG | RESPIRATION RATE: 14 BRPM | HEIGHT: 65 IN | BODY MASS INDEX: 26.37 KG/M2 | WEIGHT: 158.25 LBS | HEART RATE: 74 BPM | TEMPERATURE: 97.9 F | OXYGEN SATURATION: 99 %

## 2021-07-01 DIAGNOSIS — L98.9 SKIN LESIONS: Primary | ICD-10-CM

## 2021-07-01 PROCEDURE — 99213 OFFICE O/P EST LOW 20 MIN: CPT | Performed by: PHYSICIAN ASSISTANT

## 2021-07-01 ASSESSMENT — MIFFLIN-ST. JEOR: SCORE: 1278.7

## 2021-07-01 ASSESSMENT — ENCOUNTER SYMPTOMS
UNEXPECTED WEIGHT CHANGE: 0
LIGHT-HEADEDNESS: 0
ABDOMINAL PAIN: 0
FEVER: 0
SHORTNESS OF BREATH: 0
COUGH: 0
NERVOUS/ANXIOUS: 0
CHILLS: 0
NAUSEA: 0

## 2021-07-01 ASSESSMENT — PAIN SCALES - GENERAL: PAINLEVEL: NO PAIN (0)

## 2021-07-01 NOTE — PATIENT INSTRUCTIONS
The skin lesion on your arm may be a area of healing tissue.  Please continue to monitor this and have it checked by your  dermatologist when you see them for skin check.  The skin lesion on your forehead may be something that they can freeze, but I would like dermatology to evaluate this to determine if they need a biopsy or not.    We will place a referral for dermatology.  You should hear from their business office within the next 2-3 business days.  If you do not hear from them, please reach out to clinic so that we can follow-up.    Please reach out with any questions or concerns.

## 2021-08-10 ENCOUNTER — OFFICE VISIT (OUTPATIENT)
Dept: DERMATOLOGY | Facility: CLINIC | Age: 61
End: 2021-08-10
Attending: PHYSICIAN ASSISTANT
Payer: COMMERCIAL

## 2021-08-10 VITALS — SYSTOLIC BLOOD PRESSURE: 115 MMHG | HEART RATE: 74 BPM | DIASTOLIC BLOOD PRESSURE: 70 MMHG | OXYGEN SATURATION: 100 %

## 2021-08-10 DIAGNOSIS — L82.1 SEBORRHEIC KERATOSIS: ICD-10-CM

## 2021-08-10 DIAGNOSIS — D22.9 MULTIPLE BENIGN NEVI: Primary | ICD-10-CM

## 2021-08-10 DIAGNOSIS — D18.01 CHERRY ANGIOMA: ICD-10-CM

## 2021-08-10 DIAGNOSIS — L81.4 LENTIGO: ICD-10-CM

## 2021-08-10 DIAGNOSIS — D48.5 NEOPLASM OF UNCERTAIN BEHAVIOR OF SKIN: ICD-10-CM

## 2021-08-10 DIAGNOSIS — L98.9 SKIN LESIONS: ICD-10-CM

## 2021-08-10 PROCEDURE — 11103 TANGNTL BX SKIN EA SEP/ADDL: CPT | Performed by: PHYSICIAN ASSISTANT

## 2021-08-10 PROCEDURE — 11102 TANGNTL BX SKIN SINGLE LES: CPT | Performed by: PHYSICIAN ASSISTANT

## 2021-08-10 PROCEDURE — 88305 TISSUE EXAM BY PATHOLOGIST: CPT | Performed by: DERMATOLOGY

## 2021-08-10 PROCEDURE — 99203 OFFICE O/P NEW LOW 30 MIN: CPT | Mod: 25 | Performed by: PHYSICIAN ASSISTANT

## 2021-08-10 NOTE — NURSING NOTE
Chief Complaint   Patient presents with     Skin Check       Vitals:    08/10/21 0837   BP: 115/70   BP Location: Right arm   Patient Position: Sitting   Cuff Size: Adult Large   Pulse: 74   SpO2: 100%     Wt Readings from Last 1 Encounters:   07/01/21 71.8 kg (158 lb 4 oz)       Jennifer Johnson LPN .................8/10/2021

## 2021-08-10 NOTE — LETTER
8/10/2021         RE: Carlos Mayo  [address]  [place] [address]        Dear Colleague,    Thank you for referring your patient, Carlos Mayo, to the [facility]. Please see a copy of my visit note below.    Carlos Mayo is an extremely pleasant 60 year old year old female patient here today for spot on forehead and right arm. She notes present for a few months. No pain or bleeding.   Patient has no other skin complaints today.  Remainder of the HPI, Meds, PMH, Allergies, FH, and SH was reviewed in chart.    Pertinent Hx:   No personal history of skin cancer.   Past Medical History:   Diagnosis Date     Esophageal reflux     Resolved     Palpitations     Holter monitor cleared per patient.      Unspecified acute reaction to stress        Past Surgical History:   Procedure Laterality Date     COLONOSCOPY  09/14/2009    Procedure: Colonoscopy Providers: Grey Paulino MD, Isis Dorantes RN     OPEN REDUCTION INTERNAL FIXATION WRIST Right 9/27/2016    Procedure: OPEN REDUCTION INTERNAL FIXATION WRIST;  Surgeon: Sammie Peterson MD;  Location: [facility] OR     REMOVE HARDWARE WRIST Right 9/14/2017    Procedure: REMOVE HARDWARE WRIST;  Right distal radius removal of hardware;  Surgeon: Sammie Peterson MD;  Location: [facility] OR     SURGICAL HISTORY OF -   1980    Extraction of [place] teeth        Family History   Problem Relation Age of Onset     Hypertension Mother      Depression/Anxiety Mother      Hypertension Father      Cerebrovascular Disease Maternal Grandfather      Anesthesia Reaction No family hx of        Social History     Socioeconomic History     Marital status:      Spouse name: Not on file     Number of children: Not on file     Years of education: Not on file     Highest education level: Not on file   Occupational History     Not on file   Tobacco Use     Smoking status: Never Smoker     Smokeless tobacco: Never Used   Substance and Sexual  Activity     Alcohol use: Yes     Alcohol/week: 1.7 standard drinks     Comment: 2-3 times a month     Drug use: No     Sexual activity: Not Currently     Partners: Male     Birth control/protection: None     Comment: spouse vas   Other Topics Concern     Parent/sibling w/ CABG, MI or angioplasty before 65F 55M? No   Social History Narrative     Not on file     Social Determinants of Health     Financial Resource Strain:      Difficulty of Paying Living Expenses:    Food Insecurity:      Worried About Running Out of Food in the Last Year:      Ran Out of Food in the Last Year:    Transportation Needs:      Lack of Transportation (Medical):      Lack of Transportation (Non-Medical):    Physical Activity:      Days of Exercise per Week:      Minutes of Exercise per Session:    Stress:      Feeling of Stress :    Social Connections:      Frequency of Communication with Friends and Family:      Frequency of Social Gatherings with Friends and Family:      Attends Hinduism Services:      Active Member of Clubs or Organizations:      Attends Club or Organization Meetings:      Marital Status:    Intimate Partner Violence:      Fear of Current or Ex-Partner:      Emotionally Abused:      Physically Abused:      Sexually Abused:        Outpatient Encounter Medications as of 8/10/2021   Medication Sig Dispense Refill     Ascorbic Acid (VITAMIN C PO)        Cholecalciferol (VITAMIN D) 2000 UNITS CAPS        vitamin  B complex with vitamin C (VITAMIN  B COMPLEX) TABS Take 1 tablet by mouth daily       acetaminophen (TYLENOL) 325 MG tablet Take 2 tablets (650 mg) by mouth every 6 hours (Patient not taking: Reported on 10/23/2019) 100 tablet 0     No facility-administered encounter medications on file as of 8/10/2021.             O:   NAD, WDWN, Alert & Oriented, Mood & Affect wnl, Vitals stable   Here today alone   /70 (BP Location: Right arm, Patient Position: Sitting, Cuff Size: Adult Large)   Pulse 74   LMP  11/01/2007 (LMP Unknown)   SpO2 100%    General appearance normal   Vitals stable   Alert, oriented and in no acute distress     0.4 cm pinkish brown thin papule on right upper arm  0.3 cm brown papule with darker center on right frontal scalp  Stuck on papules and brown macules on trunk and ext   Red papules on trunk  Brown papules and macules with regular pigment network and borders on torso and extremities      The remainder of skin exam is normal     Eyes: Conjunctivae/lids:Normal     ENT: Lips normal    MSK:Normal    Cardiovascular: peripheral edema none    Pulm: Breathing Normal    Neuro/Psych: Orientation:Alert and Orientedx3 ; Mood/Affect:normal   A/P:  1.  R/O ISK vs skin cancer on right posterior upper arm and right frontal scalp  TANGENTIAL BIOPSY SENT OUT:  After consent, anesthesia with LEC and prep, tangential excision performed and specimen sent out for permanent section histology.  No complications and routine wound care. Patient told to call our office in 1-2 weeks for result.      2. Seborrheic keratosis, lentigo, angioma, benign nevi   BENIGN LESIONS DISCUSSED WITH PATIENT:  I discussed the specifics of tumor, prognosis, and genetics of benign lesions.  I explained that treatment of these lesions would be purely cosmetic and not medically neccessary.  I discussed with patient different removal options including excision, cautery and /or laser.      Nature and genetics of benign skin lesions dicussed with patient.  Signs and Symptoms of skin cancer discussed with patient.  ABCDEs of melanoma reviewed with patient.  Patient encouraged to perform monthly skin exams.  UV precautions reviewed with patient.  Risks of non-melanoma skin cancer discussed with patient   Return to clinic pending biopsy results.         Again, thank you for allowing me to participate in the care of your patient.        Sincerely,        Jolanta Kirby PA-C

## 2021-08-10 NOTE — PATIENT INSTRUCTIONS
Wound Care Instructions     FOR SUPERFICIAL WOUNDS     [facility] [phone]  [facility] [phone]    AFTER 24 HOURS YOU SHOULD REMOVE THE BANDAGE AND BEGIN DAILY DRESSING CHANGES AS FOLLOWS:     1) Remove Dressing.     2) Clean and dry the area with tap water using a Q-tip or sterile gauze pad.     3) Apply Vaseline, Aquaphor, Polysporin ointment or Bacitracin ointment over entire wound.  Do NOT use Neosporin ointment.     4) Cover the wound with a band-aid, or a sterile non-stick gauze pad and micropore paper tape      REPEAT THESE INSTRUCTIONS AT LEAST ONCE A DAY UNTIL THE WOUND HAS COMPLETELY HEALED.    It is an old wives tale that a wound heals better when it is exposed to air and allowed to dry out. The wound will heal faster with a better cosmetic result if it is kept moist with ointment and covered with a bandage.    **Do not let the wound dry out.**      Supplies Needed:      *Cotton tipped applicators (Q-tips)    *Polysporin Ointment or Bacitracin Ointment (NOT NEOSPORIN)    *Band-aids or non-stick gauze pads and micropore paper tape.      PATIENT INFORMATION:    During the healing process you will notice a number of changes. All wounds develop a small halo of redness surrounding the wound.  This means healing is occurring. Severe itching with extensive redness usually indicates sensitivity to the ointment or bandage tape used to dress the wound.  You should call our office if this develops.      Swelling  and/or discoloration around your surgical site is common, particularly when performed around the eye.    All wounds normally drain.  The larger the wound the more drainage there will be.  After 7-10 days, you will notice the wound beginning to shrink and new skin will begin to grow.  The wound is healed when you can see skin has formed over the entire area.  A healed wound has a healthy, shiny look to the surface and is red to dark pink in color to normalize.  Wounds  may take approximately 4-6 weeks to heal.  Larger wounds may take 6-8 weeks.  After the wound is healed you may discontinue dressing changes.    You may experience a sensation of tightness as your wound heals. This is normal and will gradually subside.    Your healed wound may be sensitive to temperature changes. This sensitivity improves with time, but if you re having a lot of discomfort, try to avoid temperature extremes.    Patients frequently experience itching after their wound appears to have healed because of the continue healing under the skin.  Plain Vaseline will help relieve the itching.        POSSIBLE COMPLICATIONS    BLEEDIN. Leave the bandage in place.  2. Use tightly rolled up gauze or a cloth to apply direct pressure over the bandage for 30  minutes.  3. Reapply pressure for an additional 30 minutes if necessary  4. Use additional gauze and tape to maintain pressure once the bleeding has stopped.

## 2021-08-10 NOTE — PROGRESS NOTES
Carlos Mayo is an extremely pleasant 60 year old year old female patient here today for spot on forehead and right arm. She notes present for a few months. No pain or bleeding.   Patient has no other skin complaints today.  Remainder of the HPI, Meds, PMH, Allergies, FH, and SH was reviewed in chart.    Pertinent Hx:   No personal history of skin cancer.   Past Medical History:   Diagnosis Date     Esophageal reflux     Resolved     Palpitations     Holter monitor cleared per patient.      Unspecified acute reaction to stress        Past Surgical History:   Procedure Laterality Date     COLONOSCOPY  09/14/2009    Procedure: Colonoscopy Providers: Grey Paulino MD, Isis Dorantes RN     OPEN REDUCTION INTERNAL FIXATION WRIST Right 9/27/2016    Procedure: OPEN REDUCTION INTERNAL FIXATION WRIST;  Surgeon: Sammie Peterson MD;  Location: [facility] OR     REMOVE HARDWARE WRIST Right 9/14/2017    Procedure: REMOVE HARDWARE WRIST;  Right distal radius removal of hardware;  Surgeon: Sammie Peterson MD;  Location: [facility] OR     SURGICAL HISTORY OF -   1980    Extraction of [place] teeth        Family History   Problem Relation Age of Onset     Hypertension Mother      Depression/Anxiety Mother      Hypertension Father      Cerebrovascular Disease Maternal Grandfather      Anesthesia Reaction No family hx of        Social History     Socioeconomic History     Marital status:      Spouse name: Not on file     Number of children: Not on file     Years of education: Not on file     Highest education level: Not on file   Occupational History     Not on file   Tobacco Use     Smoking status: Never Smoker     Smokeless tobacco: Never Used   Substance and Sexual Activity     Alcohol use: Yes     Alcohol/week: 1.7 standard drinks     Comment: 2-3 times a month     Drug use: No     Sexual activity: Not Currently     Partners: Male     Birth control/protection: None     Comment: spouse vas   Other Topics Concern      Parent/sibling w/ CABG, MI or angioplasty before 65F 55M? No   Social History Narrative     Not on file     Social Determinants of Health     Financial Resource Strain:      Difficulty of Paying Living Expenses:    Food Insecurity:      Worried About Running Out of Food in the Last Year:      Ran Out of Food in the Last Year:    Transportation Needs:      Lack of Transportation (Medical):      Lack of Transportation (Non-Medical):    Physical Activity:      Days of Exercise per Week:      Minutes of Exercise per Session:    Stress:      Feeling of Stress :    Social Connections:      Frequency of Communication with Friends and Family:      Frequency of Social Gatherings with Friends and Family:      Attends Protestant Services:      Active Member of Clubs or Organizations:      Attends Club or Organization Meetings:      Marital Status:    Intimate Partner Violence:      Fear of Current or Ex-Partner:      Emotionally Abused:      Physically Abused:      Sexually Abused:        Outpatient Encounter Medications as of 8/10/2021   Medication Sig Dispense Refill     Ascorbic Acid (VITAMIN C PO)        Cholecalciferol (VITAMIN D) 2000 UNITS CAPS        vitamin  B complex with vitamin C (VITAMIN  B COMPLEX) TABS Take 1 tablet by mouth daily       acetaminophen (TYLENOL) 325 MG tablet Take 2 tablets (650 mg) by mouth every 6 hours (Patient not taking: Reported on 10/23/2019) 100 tablet 0     No facility-administered encounter medications on file as of 8/10/2021.             O:   NAD, WDWN, Alert & Oriented, Mood & Affect wnl, Vitals stable   Here today alone   /70 (BP Location: Right arm, Patient Position: Sitting, Cuff Size: Adult Large)   Pulse 74   LMP 11/01/2007 (LMP Unknown)   SpO2 100%    General appearance normal   Vitals stable   Alert, oriented and in no acute distress     0.4 cm pinkish brown thin papule on right upper arm  0.3 cm brown papule with darker center on right frontal scalp  Stuck on papules  and brown macules on trunk and ext   Red papules on trunk  Brown papules and macules with regular pigment network and borders on torso and extremities      The remainder of skin exam is normal     Eyes: Conjunctivae/lids:Normal     ENT: Lips normal    MSK:Normal    Cardiovascular: peripheral edema none    Pulm: Breathing Normal    Neuro/Psych: Orientation:Alert and Orientedx3 ; Mood/Affect:normal   A/P:  1.  R/O ISK vs skin cancer on right posterior upper arm and right frontal scalp  TANGENTIAL BIOPSY SENT OUT:  After consent, anesthesia with LEC and prep, tangential excision performed and specimen sent out for permanent section histology.  No complications and routine wound care. Patient told to call our office in 1-2 weeks for result.      2. Seborrheic keratosis, lentigo, angioma, benign nevi   BENIGN LESIONS DISCUSSED WITH PATIENT:  I discussed the specifics of tumor, prognosis, and genetics of benign lesions.  I explained that treatment of these lesions would be purely cosmetic and not medically neccessary.  I discussed with patient different removal options including excision, cautery and /or laser.      Nature and genetics of benign skin lesions dicussed with patient.  Signs and Symptoms of skin cancer discussed with patient.  ABCDEs of melanoma reviewed with patient.  Patient encouraged to perform monthly skin exams.  UV precautions reviewed with patient.  Risks of non-melanoma skin cancer discussed with patient   Return to clinic pending biopsy results.

## 2021-08-11 LAB
PATH REPORT.COMMENTS IMP SPEC: ABNORMAL
PATH REPORT.COMMENTS IMP SPEC: ABNORMAL
PATH REPORT.COMMENTS IMP SPEC: YES
PATH REPORT.FINAL DX SPEC: ABNORMAL
PATH REPORT.GROSS SPEC: ABNORMAL
PATH REPORT.MICROSCOPIC SPEC OTHER STN: ABNORMAL
PATH REPORT.RELEVANT HX SPEC: ABNORMAL

## 2021-08-12 ENCOUNTER — TELEPHONE (OUTPATIENT)
Dept: DERMATOLOGY | Facility: CLINIC | Age: 61
End: 2021-08-12

## 2021-08-12 NOTE — TELEPHONE ENCOUNTER
[facility] Call Center    Phone Message    May a detailed message be left on voicemail: yes     Reason for Call: Other: Pt called and said that someone from the clinic called her regarding her test results. Please call her back to discuss. Thanks     Action Taken: Message routed to:  Other: [place] [facility]    Travel Screening: Not Applicable

## 2021-08-19 NOTE — PROGRESS NOTES
Surgical Office Location :   [facility]  [address] [place] [address]

## 2021-08-24 ENCOUNTER — OFFICE VISIT (OUTPATIENT)
Dept: DERMATOLOGY | Facility: CLINIC | Age: 61
End: 2021-08-24
Payer: COMMERCIAL

## 2021-08-24 VITALS — OXYGEN SATURATION: 100 % | DIASTOLIC BLOOD PRESSURE: 80 MMHG | HEART RATE: 72 BPM | SYSTOLIC BLOOD PRESSURE: 139 MMHG

## 2021-08-24 DIAGNOSIS — C44.612 BASAL CELL CARCINOMA (BCC) OF RIGHT UPPER ARM: Primary | ICD-10-CM

## 2021-08-24 PROCEDURE — 88331 PATH CONSLTJ SURG 1 BLK 1SPC: CPT | Performed by: DERMATOLOGY

## 2021-08-24 PROCEDURE — 11602 EXC TR-EXT MAL+MARG 1.1-2 CM: CPT | Performed by: DERMATOLOGY

## 2021-08-24 PROCEDURE — 12031 INTMD RPR S/A/T/EXT 2.5 CM/<: CPT | Performed by: DERMATOLOGY

## 2021-08-24 PROCEDURE — 88332 PATH CONSLTJ SURG EA ADD BLK: CPT | Performed by: DERMATOLOGY

## 2021-08-24 NOTE — PATIENT INSTRUCTIONS
Sutured Wound Care     [facility]: [phone]    [facility]: [phone]    Right arm      ? No strenuous activity for 48 hours. Resume moderate activity in 48 hours. No heavy exercising until you are seen for follow up in one week.     ? Take Tylenol as needed for discomfort.                         ? Do not drink alcoholic beverages for 48 hours.     ? Keep the pressure bandage in place for 24 hours. If the bandage becomes blood tinged or loose, reinforce it with gauze and tape.        (Refer to the reverse side of this page for management of bleeding).    ? Remove pressure bandage in 24 hours     ? Leave the flat bandage in place until your follow up appointment.    ? Keep the bandage dry. Wash around it carefully.    ? If the tape becomes soiled or starts to come off, reinforce it with additional paper tape.    ? Do not smoke for 3 weeks; smoking is detrimental to wound healing.    ? It is normal to have swelling and bruising around the surgical site. The bruising will fade in approximately 10-14 days. Elevate the area to reduce swelling.    ? Numbness, itchiness and sensitivity to temperature changes can occur after surgery and may take up to 18 months to normalize.      POSSIBLE COMPLICATIONS    BLEEDIN. Leave the bandage in place.  2. Use tightly rolled up gauze or a cloth to apply direct pressure over the bandage for 20   minutes.  3. Reapply pressure for an additional 20 minutes if necessary  4. Call the office or go to the nearest emergency room if pressure fails to stop the bleeding.  5. Use additional gauze and tape to maintain pressure once the bleeding has stopped.        PAIN:    1. Post operative pain should slowly get better, never worse.  2. A severe increase in pain may indicate a problem. Call the office if this occurs.    In case of emergency phone:Dr Og [phone]

## 2021-08-24 NOTE — NURSING NOTE
Chief Complaint   Patient presents with     Derm Problem     mohs 1 of 2       Vitals:    08/24/21 0718   BP: 139/80   Pulse: 72   SpO2: 100%     Wt Readings from Last 1 Encounters:   07/01/21 71.8 kg (158 lb 4 oz)       Pinky Vidales LPN.................8/24/2021

## 2021-08-24 NOTE — LETTER
8/24/2021         RE: Carlos Mayo  [address]  [facility] [address]        Dear Colleague,    Thank you for referring your patient, Carlos Mayo, to the [facility]. Please see a copy of my visit note below.    Surgical Office Location :   [facility]  [address] [place] [address]      Carlos Mayo is an extremely pleasant 61 year old year old female patient here today for evaluation and managment of basal cell carcinoma on right arm.  Patient has no other skin complaints today.  Remainder of the HPI, Meds, PMH, Allergies, FH, and SH was reviewed in chart.      Past Medical History:   Diagnosis Date     Basal cell carcinoma      Esophageal reflux     Resolved     Palpitations     Holter monitor cleared per patient.      Unspecified acute reaction to stress        Past Surgical History:   Procedure Laterality Date     COLONOSCOPY  09/14/2009    Procedure: Colonoscopy Providers: Grey Paulino MD, Isis Dorantes RN     OPEN REDUCTION INTERNAL FIXATION WRIST Right 9/27/2016    Procedure: OPEN REDUCTION INTERNAL FIXATION WRIST;  Surgeon: Sammie Peterson MD;  Location: [facility] OR     REMOVE HARDWARE WRIST Right 9/14/2017    Procedure: REMOVE HARDWARE WRIST;  Right distal radius removal of hardware;  Surgeon: Sammie Peterson MD;  Location: [facility] OR     SURGICAL HISTORY OF -   1980    Extraction of [place] teeth        Family History   Problem Relation Age of Onset     Hypertension Mother      Depression/Anxiety Mother      Hypertension Father      Cerebrovascular Disease Maternal Grandfather      Anesthesia Reaction No family hx of        Social History     Socioeconomic History     Marital status:      Spouse name: Not on file     Number of children: Not on file     Years of education: Not on file     Highest education level: Not on file   Occupational History     Not on file   Tobacco Use     Smoking status: Never Smoker     Smokeless  tobacco: Never Used   Substance and Sexual Activity     Alcohol use: Yes     Alcohol/week: 1.7 standard drinks     Comment: 2-3 times a month     Drug use: No     Sexual activity: Not Currently     Partners: Male     Birth control/protection: None     Comment: spouse vas   Other Topics Concern     Parent/sibling w/ CABG, MI or angioplasty before 65F 55M? No   Social History Narrative     Not on file     Social Determinants of Health     Financial Resource Strain:      Difficulty of Paying Living Expenses:    Food Insecurity:      Worried About Running Out of Food in the Last Year:      Ran Out of Food in the Last Year:    Transportation Needs:      Lack of Transportation (Medical):      Lack of Transportation (Non-Medical):    Physical Activity:      Days of Exercise per Week:      Minutes of Exercise per Session:    Stress:      Feeling of Stress :    Social Connections:      Frequency of Communication with Friends and Family:      Frequency of Social Gatherings with Friends and Family:      Attends Baptism Services:      Active Member of Clubs or Organizations:      Attends Club or Organization Meetings:      Marital Status:    Intimate Partner Violence:      Fear of Current or Ex-Partner:      Emotionally Abused:      Physically Abused:      Sexually Abused:        Outpatient Encounter Medications as of 8/24/2021   Medication Sig Dispense Refill     Ascorbic Acid (VITAMIN C PO)        Cholecalciferol (VITAMIN D) 2000 UNITS CAPS        vitamin  B complex with vitamin C (VITAMIN  B COMPLEX) TABS Take 1 tablet by mouth daily       acetaminophen (TYLENOL) 325 MG tablet Take 2 tablets (650 mg) by mouth every 6 hours (Patient not taking: Reported on 10/23/2019) 100 tablet 0     No facility-administered encounter medications on file as of 8/24/2021.             O:   NAD, WDWN, Alert & Oriented, Mood & Affect wnl, Vitals stable   Here today alone   /80   Pulse 72   LMP 11/01/2007 (LMP Unknown)   SpO2 100%     General appearance normal   Vitals stable   Alert, oriented and in no acute distress     R arm 6mm ulcerated bx site       Eyes: Conjunctivae/lids:Normal     ENT: Lips, buccal mucosa, tongue: normal    MSK:Normal    Cardiovascular: peripheral edema none    Pulm: Breathing Normal    Neuro/Psych: Orientation:Alert and Orientedx3 ; Mood/Affect:normal       MICRO:     R arm (blue red):Unremarkable epidermis and dermis.  No concerning areas for malignancy.     R arm (blue green ):Unremarkable epidermis and dermis.  No concerning areas for malignancy.       A/P:  1. Basal cell carcinoma r arm   EXCISION OF BASAL CELL CARCINOMA, Margins confirmed with FROZEN SECTIONS AND INT: After thorough discussion of PGACAC, consent obtained, anesthesia and prep, the margins of the lesion were identified and an elliptical incision was made encompassing the lesion with 3mm margin. The incisions were made through the skin and down to and including the subcutaneous tissue. The lesion was removed en bloc , processed into two sections and submitted for frozen section pathologic review. Clear margins obtained (1.2cm).  hemostasis was achieved. The wound edges were then closed in a layered fashion, being careful not to leave any dead space. Postoperative length was 3 cm.   EBL minimal; complications none; wound care routine. The patient was from the clinic in good condition and will return in one week for wound check.     It was a pleasure speaking to Carlos Mayo today.  Previous clinic notes and pertinent laboratory tests were reviewed prior to Carlos Mayo's visit.  Signs and Symptoms of skin cancer discussed with patient.  Patient encouraged to perform monthly skin exams.  UV precautions reviewed with patient.  Risks of non-melanoma skin cancer discussed with patient   Return to clinic 6 mnths        Again, thank you for allowing me to participate in the care of your patient.        Sincerely,        Ray Og,  MD

## 2021-08-24 NOTE — PROGRESS NOTES
Carlos Mayo is an extremely pleasant 61 year old year old female patient here today for evaluation and managment of basal cell carcinoma on right arm.  Patient has no other skin complaints today.  Remainder of the HPI, Meds, PMH, Allergies, FH, and SH was reviewed in chart.      Past Medical History:   Diagnosis Date     Basal cell carcinoma      Esophageal reflux     Resolved     Palpitations     Holter monitor cleared per patient.      Unspecified acute reaction to stress        Past Surgical History:   Procedure Laterality Date     COLONOSCOPY  09/14/2009    Procedure: Colonoscopy Providers: Grey Paulino MD, Isis Dorantes RN     OPEN REDUCTION INTERNAL FIXATION WRIST Right 9/27/2016    Procedure: OPEN REDUCTION INTERNAL FIXATION WRIST;  Surgeon: Sammie Peterson MD;  [facility]: [facility]     REMOVE HARDWARE WRIST Right 9/14/2017    Procedure: REMOVE HARDWARE WRIST;  Right distal radius removal of hardware;  Surgeon: Sammie Peterson MD;  Location: [facility] OR     SURGICAL HISTORY OF -   1980    Extraction of [place] teeth        Family History   Problem Relation Age of Onset     Hypertension Mother      Depression/Anxiety Mother      Hypertension Father      Cerebrovascular Disease Maternal Grandfather      Anesthesia Reaction No family hx of        Social History     Socioeconomic History     Marital status:      Spouse name: Not on file     Number of children: Not on file     Years of education: Not on file     Highest education level: Not on file   Occupational History     Not on file   Tobacco Use     Smoking status: Never Smoker     Smokeless tobacco: Never Used   Substance and Sexual Activity     Alcohol use: Yes     Alcohol/week: 1.7 standard drinks     Comment: 2-3 times a month     Drug use: No     Sexual activity: Not Currently     Partners: Male     Birth control/protection: None     Comment: spouse vas   Other Topics Concern     Parent/sibling w/ CABG, MI or angioplasty before 65F 55M? No    Social History Narrative     Not on file     Social Determinants of Health     Financial Resource Strain:      Difficulty of Paying Living Expenses:    Food Insecurity:      Worried About Running Out of Food in the Last Year:      Ran Out of Food in the Last Year:    Transportation Needs:      Lack of Transportation (Medical):      Lack of Transportation (Non-Medical):    Physical Activity:      Days of Exercise per Week:      Minutes of Exercise per Session:    Stress:      Feeling of Stress :    Social Connections:      Frequency of Communication with Friends and Family:      Frequency of Social Gatherings with Friends and Family:      Attends [employer] Services:      Active Member of Clubs or Organizations:      Attends Club or Organization Meetings:      Marital Status:    Intimate Partner Violence:      Fear of Current or Ex-Partner:      Emotionally Abused:      Physically Abused:      Sexually Abused:        Outpatient Encounter Medications as of 8/24/2021   Medication Sig Dispense Refill     Ascorbic Acid (VITAMIN C PO)        Cholecalciferol (VITAMIN D) 2000 UNITS CAPS        vitamin  B complex with vitamin C (VITAMIN  B COMPLEX) TABS Take 1 tablet by mouth daily       acetaminophen (TYLENOL) 325 MG tablet Take 2 tablets (650 mg) by mouth every 6 hours (Patient not taking: Reported on 10/23/2019) 100 tablet 0     No facility-administered encounter medications on file as of 8/24/2021.             O:   NAD, WDWN, Alert & Oriented, Mood & Affect wnl, Vitals stable   Here today alone   /80   Pulse 72   LMP 11/01/2007 (LMP Unknown)   SpO2 100%    General appearance normal   Vitals stable   Alert, oriented and in no acute distress     R arm 6mm ulcerated bx site       Eyes: Conjunctivae/lids:Normal     ENT: Lips, buccal mucosa, tongue: normal    MSK:Normal    Cardiovascular: peripheral edema none    Pulm: Breathing Normal    Neuro/Psych: Orientation:Alert and Orientedx3 ; Mood/Affect:normal        MICRO:     R arm (blue red):Unremarkable epidermis and dermis.  No concerning areas for malignancy.     R arm (blue green ):Unremarkable epidermis and dermis.  No concerning areas for malignancy.       A/P:  1. Basal cell carcinoma r arm   EXCISION OF BASAL CELL CARCINOMA, Margins confirmed with FROZEN SECTIONS AND INT: After thorough discussion of PGACAC, consent obtained, anesthesia and prep, the margins of the lesion were identified and an elliptical incision was made encompassing the lesion with 3mm margin. The incisions were made through the skin and down to and including the subcutaneous tissue. The lesion was removed en bloc , processed into two sections and submitted for frozen section pathologic review. Clear margins obtained (1.2cm).  hemostasis was achieved. The wound edges were then closed in a layered fashion, being careful not to leave any dead space. Postoperative length was 3 cm.   EBL minimal; complications none; wound care routine. The patient was from the clinic in good condition and will return in one week for wound check.     It was a pleasure speaking to Carlos Mayo today.  Previous clinic notes and pertinent laboratory tests were reviewed prior to Carlos Mayo's visit.  Signs and Symptoms of skin cancer discussed with patient.  Patient encouraged to perform monthly skin exams.  UV precautions reviewed with patient.  Risks of non-melanoma skin cancer discussed with patient   Return to clinic 6 mnths

## 2021-08-31 ENCOUNTER — OFFICE VISIT (OUTPATIENT)
Dept: DERMATOLOGY | Facility: CLINIC | Age: 61
End: 2021-08-31
Payer: COMMERCIAL

## 2021-08-31 ENCOUNTER — ALLIED HEALTH/NURSE VISIT (OUTPATIENT)
Dept: DERMATOLOGY | Facility: CLINIC | Age: 61
End: 2021-08-31
Payer: COMMERCIAL

## 2021-08-31 VITALS — SYSTOLIC BLOOD PRESSURE: 126 MMHG | OXYGEN SATURATION: 100 % | HEART RATE: 86 BPM | DIASTOLIC BLOOD PRESSURE: 79 MMHG

## 2021-08-31 DIAGNOSIS — Z48.01 ENCOUNTER FOR CHANGE OR REMOVAL OF SURGICAL WOUND DRESSING: Primary | ICD-10-CM

## 2021-08-31 DIAGNOSIS — C44.41 BASAL CELL CARCINOMA (BCC) OF SCALP: Primary | ICD-10-CM

## 2021-08-31 PROCEDURE — 17311 MOHS 1 STAGE H/N/HF/G: CPT | Mod: 79 | Performed by: DERMATOLOGY

## 2021-08-31 PROCEDURE — 99207 PR NO CHARGE NURSE ONLY: CPT

## 2021-08-31 PROCEDURE — 99207 PR NO CHARGE LOS: CPT | Performed by: DERMATOLOGY

## 2021-08-31 PROCEDURE — 12011 RPR F/E/E/N/L/M 2.5 CM/<: CPT | Mod: 79 | Performed by: DERMATOLOGY

## 2021-08-31 NOTE — PROGRESS NOTES
Pt returned to clinic for post surgery 1 week follow up bandage change. Pt has no complaints, denies pain. Bandage removed from back on right arm, area cleansed with normal saline. Site is healing and wound edges approximating well. Reapplied new steri strips and paper tape. Tegaderm was applied as well.     Advised to watch for signs/sx of infection; spreading redness, drainage, odor, fever. Call or report promptly to clinic. Pt given written instructions and informed to rtc as needed. Patient verbalized understanding.     Jennifer Johnson LPN   8/31/2021

## 2021-08-31 NOTE — PATIENT INSTRUCTIONS
WOUND CARE INSTRUCTIONS  for  ONE WEEK AFTER SURGERY          1) Leave flat bandage on your skin for one week after today s bandage change.  2) In one week when you remove the bandage, you may resume your regular skin care routine, including washing with mild soap and water, applying moisturizer, make-up and sunscreen.    3) If there are any open or bleeding areas at the incision/graft site you should begin to cover the area with a bandage daily as follows:    1) Clean and dry the area with plain tap water using a Q-tip or sterile gauze pad.  2) Apply Polysporin or Bacitracin ointment to the open area.  3) Cover the wound with a band-aid or a sterile non-stick gauze pad and micropore paper tape.         SIGNS OF INFECTION  - If you notice any of these signs of infection, call your doctor right away: expanding redness around the wound.  - Yellow or greenish-colored pus or cloudy wound drainage.    - Red streaking spreading from the wound.  - Increased swelling, tenderness, or pain around the wound.   - Fever.    Please remember that yellow and clear drainage from a wound can be normal and related to normal wound healing.  Isolated drainage from a wound without a combination of the above features does not indicate infection.       *Once the bandages are removed, the scar will be red and firm (especially in the lip/chin area). This is normal and will fade in time. It might take 6-12 months for this to happen.     *Massaging the area will help the scar soften and fade quicker. Begin to massage the area one month after the bandages have been removed. To massage apply pressure directly and firmly over the scar with the fingertips and move in a circular motion. Massage the area for a few minutes several times a day. Continue to massage the site for several months.    *Approximately 6-8 weeks after surgery it is not uncommon to see the formation of  tender pimple-like  bump along the scar. This is normal. As the scar  continues to mature and the stitches underneath the skin begin to dissolve, this might occur. Do not pick or squeeze, this will resolve on it s own. Should one break open producing a small amount of drainage, apply Polysporin or Bacitracin ointment a few times a day until the wound is completely healed.    *Numbness in the surgical area is expected. It might take 12-18 months for the feeling to return to normal. During this time sensations of itchiness, tingling and occasional sharp pains might be noted. These feelings are normal and will subside once the nerves have completely healed.         IN CASE OF EMERGENCY: Dr Og [phone]   If you were seen in [place] call: [phone]  If you were seen in [place] call: [phone]      Open Wound Care     for ______________        ? No strenuous activity for 48 hours    ? Take Tylenol as needed for discomfort.                                                .         ? Do not drink alcoholic beverages for 48 hours.    ? Keep the pressure bandage in place for 24 hours. If the bandage becomes blood tinged or loose, reinforce it with gauze and tape.        (Refer to the reverse side of this page for management of bleeding).    ? Remove bandage in 24 hours and begin wound care as follows:     1. Clean area with tap water using a Q tip or gauze pad, (shower / bathe normally)  2. Dry wound with Q tip or gauze pad  3. Apply Aquaphor, Vaseline, Polysporin or Bacitracin Ointment with a Q tip    Do NOT use Neosporin Ointment *  4. Cover the wound with a band-aid or nonstick gauze pad and paper tape.  5. Repeat wound care once a day until wound is completely healed.    It is an old wives tale that a wound heals better when it is exposed to air and allowed to dry out. The wound will heal faster with a better cosmetic result if it is kept moist with ointment and covered with a bandage.  Do not let the wound dry out.      Supplies Needed:                Qtips or gauze  pads                Polysporin or Bacitracin Ointment                Bandaids or nonstick gauze pads and paper tape    Wound care kits and brown paper tape are available for purchase at   the pharmacy.    BLEEDIN. Use tightly rolled up gauze or cloth to apply direct pressure over the bandage for 20   minutes.  2. Reapply pressure for an additional 20 minutes if necessary  3. Call the office or go to the nearest emergency room if pressure fails to stop the bleeding.  4. Use additional gauze and tape to maintain pressure once the bleeding has stopped.  5. Begin wound care 24 hours after surgery as directed.                  WOUND HEALING    1. One week after surgery a pink / red halo will form around the outside of the wound.   This is new skin.  2. The center of the wound will appear yellowish white and produce some drainage.  3. The pink halo will slowly migrate in toward the center of the wound until the wound is covered with new shiny pink skin.  4. There will be no more drainage when the wound is completely healed.  5. It will take six months to one year for the redness to fade.  6. The scar may be itchy, tight and sensitive to extreme temperatures for a year after the surgery.  7. Massaging the area several times a day for several minutes after the wound is completely healed will help the scar soften and normalize faster. Begin massage only after healing is complete.      In case of emergency call: Dr Og: [phone]     [facility]: [phone]    [facility]: [phone]

## 2021-08-31 NOTE — NURSING NOTE
Chief Complaint   Patient presents with     Derm Problem     Mohs       Vitals:    08/31/21 0712   BP: 126/79   BP Location: Right arm   Patient Position: Sitting   Cuff Size: Adult Large   Pulse: 86   SpO2: 100%     Wt Readings from Last 1 Encounters:   07/01/21 71.8 kg (158 lb 4 oz)       Jennifer Johnson LPN .................8/31/2021

## 2021-08-31 NOTE — LETTER
8/31/2021         RE: Carlos Mayo  [address]  [facility] [address]        Dear Colleague,    Thank you for referring your patient, Carlos Mayo, to the [facility]. Please see a copy of my visit note below.    Surgical Office Location :   [facility]  [address] [place] [address]      Carlos Mayo is an extremely pleasant 61 year old year old female patient here today for evaluation and managment of basal cell carcinoma on right frontal scalp.  Patient has no other skin complaints today.  Remainder of the HPI, Meds, PMH, Allergies, FH, and SH was reviewed in chart.      Past Medical History:   Diagnosis Date     Basal cell carcinoma      Esophageal reflux     Resolved     Palpitations     Holter monitor cleared per patient.      Unspecified acute reaction to stress        Past Surgical History:   Procedure Laterality Date     COLONOSCOPY  09/14/2009    Procedure: Colonoscopy Providers: Grey Paulino MD, Isis Dorantes RN     OPEN REDUCTION INTERNAL FIXATION WRIST Right 9/27/2016    Procedure: OPEN REDUCTION INTERNAL FIXATION WRIST;  Surgeon: Sammie Peterson MD;  Location: [facility]     REMOVE HARDWARE WRIST Right 9/14/2017    Procedure: REMOVE HARDWARE WRIST;  Right distal radius removal of hardware;  Surgeon: Sammie Peterson MD;  Location: [facility] OR     SURGICAL HISTORY OF -   1980    Extraction of [place] teeth        Family History   Problem Relation Age of Onset     Hypertension Mother      Depression/Anxiety Mother      Hypertension Father      Cerebrovascular Disease Maternal Grandfather      Anesthesia Reaction No family hx of        Social History     Socioeconomic History     Marital status:      Spouse name: Not on file     Number of children: Not on file     Years of education: Not on file     Highest education level: Not on file   Occupational History     Not on file   Tobacco Use     Smoking status: Never Smoker      Smokeless tobacco: Never Used   Substance and Sexual Activity     Alcohol use: Yes     Alcohol/week: 1.7 standard drinks     Comment: 2-3 times a month     Drug use: No     Sexual activity: Not Currently     Partners: Male     Birth control/protection: None     Comment: spouse vas   Other Topics Concern     Parent/sibling w/ CABG, MI or angioplasty before 65F 55M? No   Social History Narrative     Not on file     Social Determinants of Health     Financial Resource Strain:      Difficulty of Paying Living Expenses:    Food Insecurity:      Worried About Running Out of Food in the Last Year:      Ran Out of Food in the Last Year:    Transportation Needs:      Lack of Transportation (Medical):      Lack of Transportation (Non-Medical):    Physical Activity:      Days of Exercise per Week:      Minutes of Exercise per Session:    Stress:      Feeling of Stress :    Social Connections:      Frequency of Communication with Friends and Family:      Frequency of Social Gatherings with Friends and Family:      Attends Sikhism Services:      Active Member of Clubs or Organizations:      Attends Club or Organization Meetings:      Marital Status:    Intimate Partner Violence:      Fear of Current or Ex-Partner:      Emotionally Abused:      Physically Abused:      Sexually Abused:        Outpatient Encounter Medications as of 8/31/2021   Medication Sig Dispense Refill     acetaminophen (TYLENOL) 325 MG tablet Take 2 tablets (650 mg) by mouth every 6 hours 100 tablet 0     Ascorbic Acid (VITAMIN C PO)        Cholecalciferol (VITAMIN D) 2000 UNITS CAPS        vitamin  B complex with vitamin C (VITAMIN  B COMPLEX) TABS Take 1 tablet by mouth daily       No facility-administered encounter medications on file as of 8/31/2021.             O:   NAD, WDWN, Alert & Oriented, Mood & Affect wnl, Vitals stable   Here today alone   /79 (BP Location: Right arm, Patient Position: Sitting, Cuff Size: Adult Large)   Pulse 86   LMP  11/01/2007 (LMP Unknown)   SpO2 100%    General appearance normal   Vitals stable   Alert, oriented and in no acute distress     R frontal scalp 3mm scaly papule       Eyes: Conjunctivae/lids:Normal     ENT: Lips, buccal mucosa, tongue: normal    MSK:Normal    Cardiovascular: peripheral edema none    Pulm: Breathing Normal    Neuro/Psych: Orientation:Alert and Orientedx3 ; Mood/Affect:normal       A/P:  1. R frontal scalp basal cell carcinoma   MOHS:   Location    The rationale for Mohs surgery was discussed with the patient and consent was obtained.  The risks and benefits as well as alternatives to therapy were discussed, in detail.  Specifically, the risks of infection, scarring, bleeding, prolonged wound healing, incomplete removal, allergy to anesthesia, nerve injury and recurrence were addressed.  Indication for Mohs was Location. Prior to the procedure, the treatment site was clearly identified and, if available, confirmed with previous photos and confirmed by the patient   All components of the Universal Protocol/PAUSE rule were completed.  The Mohs surgeon operated in two distinct and integrated capacities as the surgeon and pathologist.      The area was prepped with Betasept.  A rim of normal appearing skin was marked circumferentially around the lesion.  The area was infiltrated with local anesthesia.  The tumor was first debulked to remove all clinically apparent tumor.  An incision following the standard Mohs approach was done and the specimen was oriented,mapped and placed in 1 block(s).  Each specimen was then chromacoded and processed in the Mohs laboratory using standard Mohs technique and submitted for frozen section histology.  Frozen section analysis showed  residual tumor but CLEAR MARGINS.    First stage:Orthokeratosis of epidermis with a proliferation of nests of basaloid cells, with peripheral palisading and a haphazard arrangement in the center extending into the dermis, forming nodules.   The tumor cells have hyperchromatic nuclei. Poor cytoplasm and intercellular bridging.      The tumor was excised using standard Mohs technique in 1 stages(s).  CLEAR MARGINS OBTAINED and Final defect size was 0.8 cm.     We discussed the options for wound management in full with the patient including risks/benefits/ possible outcomes.        REPAIR SECOND INTENT: We discussed the options for wound management in full with the patient including risks/benefits/possible outcomes. Decision made to allow the wound to heal by second intention. Cautery was used for for hemostasis. EBL minimal; complications none; wound care routine.  The patient was discharged in good condition and will return in one month or prn for wound evaluation.  It was a pleasure speaking to Carlos Mayo today.  Previous clinic notes and pertinent laboratory tests were reviewed prior to Carlos Mayo's visit.  Signs and Symptoms of skin cancer discussed with patient.  Patient encouraged to perform monthly skin exams.  UV precautions reviewed with patient.  Risks of non-melanoma skin cancer discussed with patient   Return to clinic 6 months        Again, thank you for allowing me to participate in the care of your patient.        Sincerely,        Ray Og MD

## 2021-10-05 ASSESSMENT — ENCOUNTER SYMPTOMS
MYALGIAS: 0
WEAKNESS: 0
HEMATOCHEZIA: 0
DIARRHEA: 0
FREQUENCY: 0
HEADACHES: 0
COUGH: 0
NAUSEA: 0
ABDOMINAL PAIN: 0
SORE THROAT: 0
HEMATURIA: 0
PARESTHESIAS: 0
JOINT SWELLING: 0
DIZZINESS: 0
CONSTIPATION: 0
ARTHRALGIAS: 1
DYSURIA: 0
FEVER: 0
PALPITATIONS: 0
CHILLS: 0
EYE PAIN: 0
NERVOUS/ANXIOUS: 0
BREAST MASS: 0
HEARTBURN: 0
SHORTNESS OF BREATH: 0

## 2021-10-06 ENCOUNTER — OFFICE VISIT (OUTPATIENT)
Dept: FAMILY MEDICINE | Facility: CLINIC | Age: 61
End: 2021-10-06
Payer: COMMERCIAL

## 2021-10-06 VITALS
OXYGEN SATURATION: 100 % | WEIGHT: 161 LBS | SYSTOLIC BLOOD PRESSURE: 124 MMHG | RESPIRATION RATE: 18 BRPM | TEMPERATURE: 97.8 F | HEIGHT: 64 IN | DIASTOLIC BLOOD PRESSURE: 74 MMHG | HEART RATE: 78 BPM | BODY MASS INDEX: 27.49 KG/M2

## 2021-10-06 DIAGNOSIS — Z00.00 ROUTINE GENERAL MEDICAL EXAMINATION AT A HEALTH CARE FACILITY: Primary | ICD-10-CM

## 2021-10-06 DIAGNOSIS — Z13.220 SCREENING FOR HYPERLIPIDEMIA: ICD-10-CM

## 2021-10-06 DIAGNOSIS — R73.09 ABNORMAL GLUCOSE: ICD-10-CM

## 2021-10-06 DIAGNOSIS — R00.2 PALPITATIONS: ICD-10-CM

## 2021-10-06 DIAGNOSIS — Z12.11 SCREEN FOR COLON CANCER: ICD-10-CM

## 2021-10-06 DIAGNOSIS — G47.00 INSOMNIA, UNSPECIFIED TYPE: ICD-10-CM

## 2021-10-06 DIAGNOSIS — Z23 FLU VACCINE NEED: ICD-10-CM

## 2021-10-06 LAB
ANION GAP SERPL CALCULATED.3IONS-SCNC: 3 MMOL/L (ref 3–14)
BUN SERPL-MCNC: 14 MG/DL (ref 7–30)
CALCIUM SERPL-MCNC: 9.8 MG/DL (ref 8.5–10.1)
CHLORIDE BLD-SCNC: 103 MMOL/L (ref 94–109)
CHOLEST SERPL-MCNC: 231 MG/DL
CO2 SERPL-SCNC: 31 MMOL/L (ref 20–32)
CREAT SERPL-MCNC: 0.82 MG/DL (ref 0.52–1.04)
ERYTHROCYTE [DISTWIDTH] IN BLOOD BY AUTOMATED COUNT: 12.5 % (ref 10–15)
FASTING STATUS PATIENT QL REPORTED: NO
GFR SERPL CREATININE-BSD FRML MDRD: 76 ML/MIN/1.73M2
GLUCOSE BLD-MCNC: 107 MG/DL (ref 70–99)
HBA1C MFR BLD: 4.8 % (ref 0–5.6)
HCT VFR BLD AUTO: 41.3 % (ref 35–47)
HDLC SERPL-MCNC: 98 MG/DL
HGB BLD-MCNC: 13.2 G/DL (ref 11.7–15.7)
LDLC SERPL CALC-MCNC: 118 MG/DL
MCH RBC QN AUTO: 30.1 PG (ref 26.5–33)
MCHC RBC AUTO-ENTMCNC: 32 G/DL (ref 31.5–36.5)
MCV RBC AUTO: 94 FL (ref 78–100)
NONHDLC SERPL-MCNC: 133 MG/DL
PLATELET # BLD AUTO: 277 10E3/UL (ref 150–450)
POTASSIUM BLD-SCNC: 5.5 MMOL/L (ref 3.4–5.3)
RBC # BLD AUTO: 4.38 10E6/UL (ref 3.8–5.2)
SODIUM SERPL-SCNC: 137 MMOL/L (ref 133–144)
TRIGL SERPL-MCNC: 77 MG/DL
WBC # BLD AUTO: 7.8 10E3/UL (ref 4–11)

## 2021-10-06 PROCEDURE — 90682 RIV4 VACC RECOMBINANT DNA IM: CPT | Performed by: FAMILY MEDICINE

## 2021-10-06 PROCEDURE — 83036 HEMOGLOBIN GLYCOSYLATED A1C: CPT | Performed by: FAMILY MEDICINE

## 2021-10-06 PROCEDURE — 99213 OFFICE O/P EST LOW 20 MIN: CPT | Mod: 25 | Performed by: FAMILY MEDICINE

## 2021-10-06 PROCEDURE — 90471 IMMUNIZATION ADMIN: CPT | Performed by: FAMILY MEDICINE

## 2021-10-06 PROCEDURE — 80048 BASIC METABOLIC PNL TOTAL CA: CPT | Performed by: FAMILY MEDICINE

## 2021-10-06 PROCEDURE — 99396 PREV VISIT EST AGE 40-64: CPT | Mod: 25 | Performed by: FAMILY MEDICINE

## 2021-10-06 PROCEDURE — 80061 LIPID PANEL: CPT | Performed by: FAMILY MEDICINE

## 2021-10-06 PROCEDURE — 36415 COLL VENOUS BLD VENIPUNCTURE: CPT | Performed by: FAMILY MEDICINE

## 2021-10-06 PROCEDURE — 85027 COMPLETE CBC AUTOMATED: CPT | Performed by: FAMILY MEDICINE

## 2021-10-06 ASSESSMENT — ENCOUNTER SYMPTOMS
PALPITATIONS: 0
WEAKNESS: 0
ABDOMINAL PAIN: 0
DYSURIA: 0
HEADACHES: 0
NAUSEA: 0
SHORTNESS OF BREATH: 0
MYALGIAS: 0
PARESTHESIAS: 0
JOINT SWELLING: 0
HEMATURIA: 0
BREAST MASS: 0
FEVER: 0
SORE THROAT: 0
DIZZINESS: 0
ARTHRALGIAS: 1
NERVOUS/ANXIOUS: 0
EYE PAIN: 0
CONSTIPATION: 0
CHILLS: 0
DIARRHEA: 0
HEARTBURN: 0
COUGH: 0
HEMATOCHEZIA: 0
FREQUENCY: 0

## 2021-10-06 ASSESSMENT — MIFFLIN-ST. JEOR: SCORE: 1270.29

## 2021-10-06 NOTE — PATIENT INSTRUCTIONS
Preventive Health Recommendations  Female Ages 50 - 64    Overall, you're doing well.      Blood tests today.     See our sleep specialist: [phone].     Our office will contact you about setting up a colonoscopy.     Yearly physical.     For now, I don't think you need do anything with the uterine prolapse without symptoms.     Yearly dermatologist.       Yearly exam: See your health care provider every year in order to  o Review health changes.   o Discuss preventive care.    o Review your medicines if your doctor has prescribed any.      Get a Pap test every three years (unless you have an abnormal result and your provider advises testing more often).    If you get Pap tests with HPV test, you only need to test every 5 years, unless you have an abnormal result.     You do not need a Pap test if your uterus was removed (hysterectomy) and you have not had cancer.    You should be tested each year for STDs (sexually transmitted diseases) if you're at risk.     Have a mammogram every 1 to 2 years.    Have a colonoscopy at age 50, or have a yearly FIT test (stool test). These exams screen for colon cancer.      Have a cholesterol test every 5 years, or more often if advised.    Have a diabetes test (fasting glucose) every three years. If you are at risk for diabetes, you should have this test more often.     If you are at risk for osteoporosis (brittle bone disease), think about having a bone density scan (DEXA).    Shots: Get a flu shot each year. Get a tetanus shot every 10 years.    Nutrition:     Eat at least 5 servings of fruits and vegetables each day.    Eat whole-grain bread, whole-wheat pasta and brown rice instead of white grains and rice.    Get adequate Calcium and Vitamin D.     Lifestyle    Exercise at least 150 minutes a week (30 minutes a day, 5 days a week). This will help you control your weight and prevent disease.    Limit alcohol to one drink per day.    No smoking.     Wear sunscreen to  prevent skin cancer.     See your dentist every six months for an exam and cleaning.    See your eye doctor every 1 to 2 years.

## 2021-10-12 ENCOUNTER — TELEPHONE (OUTPATIENT)
Dept: FAMILY MEDICINE | Facility: CLINIC | Age: 61
End: 2021-10-12

## 2022-02-22 ENCOUNTER — TELEPHONE (OUTPATIENT)
Dept: FAMILY MEDICINE | Facility: CLINIC | Age: 62
End: 2022-02-22
Payer: COMMERCIAL

## 2022-02-22 DIAGNOSIS — Z12.11 SCREENING FOR COLON CANCER: Primary | ICD-10-CM

## 2022-02-22 DIAGNOSIS — Z12.31 ENCOUNTER FOR SCREENING MAMMOGRAM FOR BREAST CANCER: ICD-10-CM

## 2022-02-22 NOTE — TELEPHONE ENCOUNTER
Patient Quality Outreach    Patient is due for the following:   Colonoscopy, mammogram 4/21/22 and covid booster    NEXT STEPS:   Schedule colonoscopy and mammgram    Type of outreach:    Sent [employer] message.      Questions for provider review:    None     Ignacia Valentino, CMA

## 2022-03-01 ENCOUNTER — OFFICE VISIT (OUTPATIENT)
Dept: DERMATOLOGY | Facility: CLINIC | Age: 62
End: 2022-03-01
Payer: COMMERCIAL

## 2022-03-01 VITALS — OXYGEN SATURATION: 100 % | SYSTOLIC BLOOD PRESSURE: 128 MMHG | HEART RATE: 86 BPM | DIASTOLIC BLOOD PRESSURE: 78 MMHG

## 2022-03-01 DIAGNOSIS — L81.4 LENTIGO: ICD-10-CM

## 2022-03-01 DIAGNOSIS — Z85.828 HISTORY OF SKIN CANCER: Primary | ICD-10-CM

## 2022-03-01 DIAGNOSIS — D18.01 ANGIOMA OF SKIN: ICD-10-CM

## 2022-03-01 DIAGNOSIS — D23.9 DERMAL NEVUS: ICD-10-CM

## 2022-03-01 DIAGNOSIS — L82.1 SEBORRHEIC KERATOSIS: ICD-10-CM

## 2022-03-01 DIAGNOSIS — L30.9 CHRONIC DERMATITIS OF HANDS: ICD-10-CM

## 2022-03-01 PROCEDURE — 99214 OFFICE O/P EST MOD 30 MIN: CPT | Performed by: DERMATOLOGY

## 2022-03-01 RX ORDER — FLUOCINONIDE 0.5 MG/G
CREAM TOPICAL 2 TIMES DAILY
Qty: 120 G | Refills: 6 | Status: SHIPPED | OUTPATIENT
Start: 2022-03-01

## 2022-03-01 NOTE — LETTER
3/1/2022         RE: Carlos Mayo  [address] [facility] [address]        Dear Colleague,    Thank you for referring your patient, Carlos Mayo, to the [facility]. Please see a copy of my visit note below.    Carlos Mayo is an extremely pleasant 61 year old year old female patient here today for rash on hands.   .   Patient states this has been present for a while.  Patient reports the following symptoms:  itching.  Patient reports the following previous treatments none.  These treatments did not work.  Patient reports the following modifying factors none.  Associated symptoms: none.  Patient has no other skin complaints today.  Remainder of the HPI, Meds, PMH, Allergies, FH, and SH was reviewed in chart.      Past Medical History:   Diagnosis Date     Basal cell carcinoma      Esophageal reflux     Resolved     Palpitations     Holter monitor cleared per patient.      Unspecified acute reaction to stress        Past Surgical History:   Procedure Laterality Date     COLONOSCOPY  09/14/2009    Procedure: Colonoscopy Providers: Grey Paulino MD, Isis Dorantes RN     OPEN REDUCTION INTERNAL FIXATION WRIST Right 9/27/2016    Procedure: OPEN REDUCTION INTERNAL FIXATION WRIST;  Surgeon: Sammie Peterson MD;  [facility]: [facility] OR     REMOVE HARDWARE WRIST Right 9/14/2017    Procedure: REMOVE HARDWARE WRIST;  Right distal radius removal of hardware;  Surgeon: Sammie Peterson MD;  Location:  OR     SURGICAL HISTORY OF -   1980    Extraction of [place] teeth        Family History   Problem Relation Age of Onset     Hypertension Mother      Depression/Anxiety Mother      Hypertension Father      Cerebrovascular Disease Maternal Grandfather      Anesthesia Reaction No family hx of        Social History     Socioeconomic History     Marital status:      Spouse name: Not on file     Number of children: Not on file     Years of education: Not on file     Highest  education level: Not on file   Occupational History     Not on file   Tobacco Use     Smoking status: Never Smoker     Smokeless tobacco: Never Used   Substance and Sexual Activity     Alcohol use: Yes     Alcohol/week: 1.7 standard drinks     Comment: 2-3 times a month     Drug use: No     Sexual activity: Not Currently     Partners: Male     Birth control/protection: None     Comment: spouse vas   Other Topics Concern     Parent/sibling w/ CABG, MI or angioplasty before 65F 55M? No   Social History Narrative     Not on file     Social Determinants of Health     Financial Resource Strain: Not on file   Food Insecurity: Not on file   Transportation Needs: Not on file   Physical Activity: Not on file   Stress: Not on file   Social Connections: Not on file   Intimate Partner Violence: Not on file   Housing Stability: Not on file       Outpatient Encounter Medications as of 3/1/2022   Medication Sig Dispense Refill     Ascorbic Acid (VITAMIN C PO)        Cholecalciferol (VITAMIN D) 2000 UNITS CAPS        vitamin  B complex with vitamin C (VITAMIN  B COMPLEX) TABS Take 1 tablet by mouth daily       No facility-administered encounter medications on file as of 3/1/2022.             O:   NAD, WDWN, Alert & Oriented, Mood & Affect wnl, Vitals stable   Here today alone   /78 (BP Location: Left arm, Patient Position: Sitting, Cuff Size: Adult Regular)   Pulse 86   LMP 11/01/2007 (LMP Unknown)   SpO2 100%    General appearance normal   Vitals stable   Alert, oriented and in no acute distress      Following lymph nodes palpated: Occipital, Cervical, Supraclavicular no lad  Eczematous patches on hands      Stuck on papules and brown macules on trunk and ext   Red papules on trunk  Flesh colored papules on trunk     The remainder of the full exam was normal; the following areas were examined:  conjunctiva/lids, oral mucosa, neck, peripheral vascular system, abdomen, lymph nodes, digits/nails, eccrine and apocrine glands,  scalp/hair, face, neck, chest, abdomen, buttocks, back, RUE, LUE, RLE, LLE       Eyes: Conjunctivae/lids:Normal     ENT: Lips, buccal mucosa, tongue: normal    MSK:Normal    Cardiovascular: peripheral edema none    Pulm: Breathing Normal    Lymph Nodes: No Head and Neck Lymphadenopathy     Neuro/Psych: Orientation:Alert and Orientedx3 ; Mood/Affect:normal       A/P:  1. Seborrheic keratosis, lentigo, angioma, dermal nevus, hx of non-melanoma skin cancer   2. Hand dermatitis  Skin care discussed with patient   Lidex twice daily  It was a pleasure speaking to Carlos Mayo today.  Previous clinic notes and pertinent laboratory tests were reviewed prior to Carlos Mayo's visit.    Nature and genetics of benign skin lesions dicussed with patient.  Signs and Symptoms of skin cancer discussed with patient.  Patient encouraged to perform monthly skin exams.  UV precautions reviewed with patient.  Skin care regimen reviewed with patient: Eliminate harsh soaps, i.e. Dial, zest, irsih spring; Mild soaps such as Cetaphil or Dove sensitive skin, avoid hot or cold showers, aggressive use of emollients including vanicream, cetaphil or cerave discussed with patient.    Risks of non-melanoma skin cancer discussed with patient   Return to clinic 12 months        Again, thank you for allowing me to participate in the care of your patient.        Sincerely,        Ray Og MD

## 2022-03-01 NOTE — NURSING NOTE
"Initial /78 (BP Location: Left arm, Patient Position: Sitting, Cuff Size: Adult Regular)   Pulse 86   LMP 11/01/2007 (LMP Unknown)   SpO2 100%  Estimated body mass index is 27.64 kg/m  as calculated from the following:    Height as of 10/6/21: 1.626 m (5' 4\").    Weight as of 10/6/21: 73 kg (161 lb). .      "

## 2022-03-01 NOTE — PROGRESS NOTES
Carlos Mayo is an extremely pleasant 61 year old year old female patient here today for rash on hands.   .   Patient states this has been present for a while.  Patient reports the following symptoms:  itching.  Patient reports the following previous treatments none.  These treatments did not work.  Patient reports the following modifying factors none.  Associated symptoms: none.  Patient has no other skin complaints today.  Remainder of the HPI, Meds, PMH, Allergies, FH, and SH was reviewed in chart.      Past Medical History:   Diagnosis Date     Basal cell carcinoma      Esophageal reflux     Resolved     Palpitations     Holter monitor cleared per patient.      Unspecified acute reaction to stress        Past Surgical History:   Procedure Laterality Date     COLONOSCOPY  09/14/2009    Procedure: Colonoscopy Providers: Grey Paulino MD, Isis Dorantes RN     OPEN REDUCTION INTERNAL FIXATION WRIST Right 9/27/2016    Procedure: OPEN REDUCTION INTERNAL FIXATION WRIST;  Surgeon: Sammie Peterson MD;  [facility]: [facility] OR     REMOVE HARDWARE WRIST Right 9/14/2017    Procedure: REMOVE HARDWARE WRIST;  Right distal radius removal of hardware;  Surgeon: Sammie Peterson MD;  Location: [facility] OR     SURGICAL HISTORY OF -   1980    Extraction of [place] teeth        Family History   Problem Relation Age of Onset     Hypertension Mother      Depression/Anxiety Mother      Hypertension Father      Cerebrovascular Disease Maternal Grandfather      Anesthesia Reaction No family hx of        Social History     Socioeconomic History     Marital status:      Spouse name: Not on file     Number of children: Not on file     Years of education: Not on file     Highest education level: Not on file   Occupational History     Not on file   Tobacco Use     Smoking status: Never Smoker     Smokeless tobacco: Never Used   Substance and Sexual Activity     Alcohol use: Yes     Alcohol/week: 1.7 standard drinks     Comment: 2-3  times a month     Drug use: No     Sexual activity: Not Currently     Partners: Male     Birth control/protection: None     Comment: spouse vas   Other Topics Concern     Parent/sibling w/ CABG, MI or angioplasty before 65F 55M? No   Social History Narrative     Not on file     Social Determinants of Health     Financial Resource Strain: Not on file   Food Insecurity: Not on file   Transportation Needs: Not on file   Physical Activity: Not on file   Stress: Not on file   Social Connections: Not on file   Intimate Partner Violence: Not on file   Housing Stability: Not on file       Outpatient Encounter Medications as of 3/1/2022   Medication Sig Dispense Refill     Ascorbic Acid (VITAMIN C PO)        Cholecalciferol (VITAMIN D) 2000 UNITS CAPS        vitamin  B complex with vitamin C (VITAMIN  B COMPLEX) TABS Take 1 tablet by mouth daily       No facility-administered encounter medications on file as of 3/1/2022.             O:   NAD, WDWN, Alert & Oriented, Mood & Affect wnl, Vitals stable   Here today alone   /78 (BP Location: Left arm, Patient Position: Sitting, Cuff Size: Adult Regular)   Pulse 86   LMP 11/01/2007 (LMP Unknown)   SpO2 100%    General appearance normal   Vitals stable   Alert, oriented and in no acute distress      Following lymph nodes palpated: Occipital, Cervical, Supraclavicular no lad  Eczematous patches on hands      Stuck on papules and brown macules on trunk and ext   Red papules on trunk  Flesh colored papules on trunk     The remainder of the full exam was normal; the following areas were examined:  conjunctiva/lids, oral mucosa, neck, peripheral vascular system, abdomen, lymph nodes, digits/nails, eccrine and apocrine glands, scalp/hair, face, neck, chest, abdomen, buttocks, back, RUE, LUE, RLE, LLE       Eyes: Conjunctivae/lids:Normal     ENT: Lips, buccal mucosa, tongue: normal    MSK:Normal    Cardiovascular: peripheral edema none    Pulm: Breathing Normal    Lymph Nodes: No  Head and Neck Lymphadenopathy     Neuro/Psych: Orientation:Alert and Orientedx3 ; Mood/Affect:normal       A/P:  1. Seborrheic keratosis, lentigo, angioma, dermal nevus, hx of non-melanoma skin cancer   2. Hand dermatitis  Skin care discussed with patient   Lidex twice daily  It was a pleasure speaking to Carlos Mayo today.  Previous clinic notes and pertinent laboratory tests were reviewed prior to Carlos Mayo's visit.    Nature and genetics of benign skin lesions dicussed with patient.  Signs and Symptoms of skin cancer discussed with patient.  Patient encouraged to perform monthly skin exams.  UV precautions reviewed with patient.  Skin care regimen reviewed with patient: Eliminate harsh soaps, i.e. Dial, zest, irsih spring; Mild soaps such as Cetaphil or Dove sensitive skin, avoid hot or cold showers, aggressive use of emollients including vanicream, cetaphil or cerave discussed with patient.    Risks of non-melanoma skin cancer discussed with patient   Return to clinic 12 months

## 2022-08-23 ENCOUNTER — OFFICE VISIT (OUTPATIENT)
Dept: DERMATOLOGY | Facility: CLINIC | Age: 62
End: 2022-08-23
Payer: COMMERCIAL

## 2022-08-23 ENCOUNTER — TELEPHONE (OUTPATIENT)
Dept: DERMATOLOGY | Facility: CLINIC | Age: 62
End: 2022-08-23

## 2022-08-23 DIAGNOSIS — C44.319 BASAL CELL CARCINOMA (BCC) OF FOREHEAD: Primary | ICD-10-CM

## 2022-08-23 DIAGNOSIS — D48.5 NEOPLASM OF UNCERTAIN BEHAVIOR OF SKIN: Primary | ICD-10-CM

## 2022-08-23 PROCEDURE — 88331 PATH CONSLTJ SURG 1 BLK 1SPC: CPT | Performed by: DERMATOLOGY

## 2022-08-23 PROCEDURE — 11102 TANGNTL BX SKIN SINGLE LES: CPT | Performed by: PHYSICIAN ASSISTANT

## 2022-08-23 PROCEDURE — 99212 OFFICE O/P EST SF 10 MIN: CPT | Mod: 25 | Performed by: PHYSICIAN ASSISTANT

## 2022-08-23 ASSESSMENT — PAIN SCALES - GENERAL: PAINLEVEL: NO PAIN (0)

## 2022-08-23 NOTE — TELEPHONE ENCOUNTER
Patient notified. Patient verbalized understanding. Scheduled for MOHS Surgery. Mohs Pre-op letter sent via My chart.   Vani Arvizu RN

## 2022-08-23 NOTE — PROGRESS NOTES
L mid forehead:Orthokeratosis of epidermis with a proliferation of nests of basaloid cells, with peripheral palisading and a haphazard arrangement in the center extending into the dermis, forming nodules.  The tumor cells have hyperchromatic nuclei. Poor cytoplasm and intercellular bridging.    L mid forehead basal cell carcinoma schedule excision

## 2022-08-23 NOTE — LETTER
8/23/2022         RE: Carlos Mayo  [address] [place] [id number]        Dear Colleague,    Thank you for referring your patient, Carlos Mayo, to the [facility]. Please see a copy of my visit note below.    L mid forehead:Orthokeratosis of epidermis with a proliferation of nests of basaloid cells, with peripheral palisading and a haphazard arrangement in the center extending into the dermis, forming nodules.  The tumor cells have hyperchromatic nuclei. Poor cytoplasm and intercellular bridging.    L mid forehead basal cell carcinoma schedule excision       Again, thank you for allowing me to participate in the care of your patient.        Sincerely,        Ray Og MD

## 2022-08-23 NOTE — TELEPHONE ENCOUNTER
----- Message from aRy Og MD sent at 8/23/2022  1:44 PM CDT -----  L mid forehead basal cell carcinoma schedule excision

## 2022-08-23 NOTE — LETTER
8/23/2022         RE: Carlos Mayo  [address]  [facility] [address]        Dear Colleague,    Thank you for referring your patient, Carlos Mayo, to the [facility]. Please see a copy of my visit note below.    Carlos Mayo is an extremely pleasant 61 year old year old female patient here today for spot on forehead. Present for a few months. No pain or bleeding. Not resolving on its own. Patient has no other skin complaints today.  Remainder of the HPI, Meds, PMH, Allergies, FH, and SH was reviewed in chart.    Pertinent Hx:   History of BCC  Past Medical History:   Diagnosis Date     Basal cell carcinoma      Esophageal reflux     Resolved     Palpitations     Holter monitor cleared per patient.      Unspecified acute reaction to stress        Past Surgical History:   Procedure Laterality Date     COLONOSCOPY  09/14/2009    Procedure: Colonoscopy Providers: Grey Paulino MD, Isis Dorantes RN     OPEN REDUCTION INTERNAL FIXATION WRIST Right 9/27/2016    Procedure: OPEN REDUCTION INTERNAL FIXATION WRIST;  Surgeon: Sammie Peterson MD;  Location: [facility]     REMOVE HARDWARE WRIST Right 9/14/2017    Procedure: REMOVE HARDWARE WRIST;  Right distal radius removal of hardware;  Surgeon: Sammie Peterson MD;  Location: [facility] OR     SURGICAL HISTORY OF -   1980    Extraction of [place] teeth        Family History   Problem Relation Age of Onset     Hypertension Mother      Depression/Anxiety Mother      Hypertension Father      Cerebrovascular Disease Maternal Grandfather      Anesthesia Reaction No family hx of        Social History     Socioeconomic History     Marital status:      Spouse name: Not on file     Number of children: Not on file     Years of education: Not on file     Highest education level: Not on file   Occupational History     Not on file   Tobacco Use     Smoking status: Never Smoker     Smokeless tobacco: Never Used   Substance and Sexual  Activity     Alcohol use: Yes     Alcohol/week: 1.7 standard drinks     Comment: 2-3 times a month     Drug use: No     Sexual activity: Not Currently     Partners: Male     Birth control/protection: None     Comment: spouse vas   Other Topics Concern     Parent/sibling w/ CABG, MI or angioplasty before 65F 55M? No   Social History Narrative     Not on file     Social Determinants of Health     Financial Resource Strain: Not on file   Food Insecurity: Not on file   Transportation Needs: Not on file   Physical Activity: Not on file   Stress: Not on file   Social Connections: Not on file   Intimate Partner Violence: Not on file   Housing Stability: Not on file       Outpatient Encounter Medications as of 8/23/2022   Medication Sig Dispense Refill     Ascorbic Acid (VITAMIN C PO)        Cholecalciferol (VITAMIN D) 2000 UNITS CAPS        fluocinonide (LIDEX) 0.05 % external cream Apply topically 2 times daily 120 g 6     vitamin B complex with vitamin C (STRESS TAB) tablet Take 1 tablet by mouth daily       No facility-administered encounter medications on file as of 8/23/2022.             O:   NAD, WDWN, Alert & Oriented, Mood & Affect wnl, Vitals stable   Here today alone   LMP 11/01/2007 (LMP Unknown)    General appearance normal   Vitals stable   Alert, oriented and in no acute distress     0.3 cm pink pearly papule on left mid forehead      Eyes: Conjunctivae/lids:Normal     ENT: Lips: normal    MSK:Normal    Pulm: Breathing Normal    Neuro/Psych: Orientation:Alert and Orientedx3 ; Mood/Affect:normal   A/P:  1. R/O BCC on left mid forehead   TANGENTIAL BIOPSY IN HOUSE:  After consent, anesthesia with LEC and prep, tangential excision performed.  No complications and routine wound care.              Again, thank you for allowing me to participate in the care of your patient.        Sincerely,        Jolanta Kirby PA-C

## 2022-08-23 NOTE — PROGRESS NOTES
Carlos Mayo is an extremely pleasant 61 year old year old female patient here today for spot on forehead. Present for a few months. No pain or bleeding. Not resolving on its own. Patient has no other skin complaints today.  Remainder of the HPI, Meds, PMH, Allergies, FH, and SH was reviewed in chart.    Pertinent Hx:   History of BCC  Past Medical History:   Diagnosis Date     Basal cell carcinoma      Esophageal reflux     Resolved     Palpitations     Holter monitor cleared per patient.      Unspecified acute reaction to stress        Past Surgical History:   Procedure Laterality Date     COLONOSCOPY  09/14/2009    Procedure: Colonoscopy Providers: Grey Paulino MD, Isis Dorantes RN     OPEN REDUCTION INTERNAL FIXATION WRIST Right 9/27/2016    Procedure: OPEN REDUCTION INTERNAL FIXATION WRIST;  Surgeon: Sammie Peterson MD;  [facility]     REMOVE HARDWARE WRIST Right 9/14/2017    Procedure: REMOVE HARDWARE WRIST;  Right distal radius removal of hardware;  Surgeon: Sammie Peterson MD;  Location: [facility]     SURGICAL HISTORY OF -   1980    Extraction of [place] teeth        Family History   Problem Relation Age of Onset     Hypertension Mother      Depression/Anxiety Mother      Hypertension Father      Cerebrovascular Disease Maternal Grandfather      Anesthesia Reaction No family hx of        Social History     Socioeconomic History     Marital status:      Spouse name: Not on file     Number of children: Not on file     Years of education: Not on file     Highest education level: Not on file   Occupational History     Not on file   Tobacco Use     Smoking status: Never Smoker     Smokeless tobacco: Never Used   Substance and Sexual Activity     Alcohol use: Yes     Alcohol/week: 1.7 standard drinks     Comment: 2-3 times a month     Drug use: No     Sexual activity: Not Currently     Partners: Male     Birth control/protection: None     Comment: spouse vas   Other Topics Concern      Parent/sibling w/ CABG, MI or angioplasty before 65F 55M? No   Social History Narrative     Not on file     Social Determinants of Health     Financial Resource Strain: Not on file   Food Insecurity: Not on file   Transportation Needs: Not on file   Physical Activity: Not on file   Stress: Not on file   Social Connections: Not on file   Intimate Partner Violence: Not on file   Housing Stability: Not on file       Outpatient Encounter Medications as of 8/23/2022   Medication Sig Dispense Refill     Ascorbic Acid (VITAMIN C PO)        Cholecalciferol (VITAMIN D) 2000 UNITS CAPS        fluocinonide (LIDEX) 0.05 % external cream Apply topically 2 times daily 120 g 6     vitamin B complex with vitamin C (STRESS TAB) tablet Take 1 tablet by mouth daily       No facility-administered encounter medications on file as of 8/23/2022.             O:   NAD, WDWN, Alert & Oriented, Mood & Affect wnl, Vitals stable   Here today alone   LMP 11/01/2007 (LMP Unknown)    General appearance normal   Vitals stable   Alert, oriented and in no acute distress     0.3 cm pink pearly papule on left mid forehead      Eyes: Conjunctivae/lids:Normal     ENT: Lips: normal    MSK:Normal    Pulm: Breathing Normal    Neuro/Psych: Orientation:Alert and Orientedx3 ; Mood/Affect:normal   A/P:  1. R/O BCC on left mid forehead   TANGENTIAL BIOPSY IN HOUSE:  After consent, anesthesia with LEC and prep, tangential excision performed.  No complications and routine wound care.

## 2022-08-23 NOTE — LETTER
[facility] [place]  [address]  [facility] [address]  Phone: [phone]    August 23, 2022    Carlos Mayo                                                                                                               [address] [place] [id number]            Dear Ms. Mayo,    You are scheduled for Mohs Surgery on:     Tuesday October 25 th at 7;30 am.     Please check in at Dermatology Clinic.   (2nd Floor, last  Clinic on right up staircase or elevator -past OB/GYN clinic)    You don't need to arrive more than 5-10 minutes prior to your appointment time.     Be sure to eat a good breakfast and bathe and wash your hair prior to Surgery.    If you are taking any anti-coagulants that are prescribed by your Doctor (such as Coumadin/warfarin, Plavix, Aspirin, Ibuprofen), please continue taking them.     However, If you are taking anti-coagulants over the counter without  a Doctor's order for a Medical condition, please discontinue them 10 days prior to Surgery.      Please wear loose comfortable clothing as it could possibly be 4-6 hours until your surgery is completed depending upon how many layers of tissue need to be removed.     Wi-fi access is available.     Thank you,      Ray Og MD/ Vani Arvizu RN

## 2022-08-23 NOTE — NURSING NOTE
Chief Complaint   Patient presents with     Derm Problem     Spot on forehead       There were no vitals filed for this visit.  Wt Readings from Last 1 Encounters:   10/06/21 73 kg (161 lb)       Jennifer Johnson LPN .................8/23/2022

## 2022-08-23 NOTE — PATIENT INSTRUCTIONS
Wound Care Instructions     FOR SUPERFICIAL WOUNDS     AFTER 24 HOURS YOU SHOULD REMOVE THE BANDAGE AND BEGIN DAILY DRESSING CHANGES AS FOLLOWS:     1) Remove Dressing.     2) Clean and dry the area with tap water using a Q-tip or sterile gauze pad.     3) Apply Polysporin ointment, vaseline, aquaphor or Bacitracin ointment over entire wound.  Do NOT use Neosporin ointment.     4) Cover the wound with a band-aid, or a sterile non-stick gauze pad and micropore paper tape      REPEAT THESE INSTRUCTIONS AT LEAST ONCE A DAY UNTIL THE WOUND HAS COMPLETELY HEALED.    It is an old wives tale that a wound heals better when it is exposed to air and allowed to dry out. The wound will heal faster with a better cosmetic result if it is kept moist with ointment and covered with a bandage.    **Do not let the wound dry out.**      Supplies Needed:      *Cotton tipped applicators (Q-tips)    *Polysporin Ointment or Bacitracin Ointment (NOT NEOSPORIN)    *Band-aids or non-stick gauze pads and micropore paper tape.    PATIENT INFORMATION:    During the healing process you will notice a number of changes. All wounds develop a small halo of redness surrounding the wound.  This means healing is occurring. Severe itching with extensive redness usually indicates sensitivity to the ointment or bandage tape used to dress the wound.  You should call our office if this develops.      Swelling  and/or discoloration around your surgical site is common, particularly when performed around the eye.    All wounds normally drain.  The larger the wound the more drainage there will be.  After 7-10 days, you will notice the wound beginning to shrink and new skin will begin to grow.  The wound is healed when you can see skin has formed over the entire area.  A healed wound has a healthy, shiny look to the surface and is red to dark pink in color to normalize.  Wounds may take approximately 4-6 weeks to heal.  Larger wounds may take 6-8 weeks.  After  the wound is healed you may discontinue dressing changes.    You may experience a sensation of tightness as your wound heals. This is normal and will gradually subside.    Your healed wound may be sensitive to temperature changes. This sensitivity improves with time, but if you re having a lot of discomfort, try to avoid temperature extremes.    Patients frequently experience itching after their wound appears to have healed because of the continue healing under the skin.  Plain Vaseline will help relieve the itching.    BLEEDING:   Leave the bandage in place.  Use tightly rolled up gauze or a cloth to apply direct pressure over the bandage for 20 minutes.  Reapply pressure for an additional 20 minutes if necessary  Call the office or go to the nearest emergency room if pressure fails to stop the bleeding.  Use additional gauze and tape to maintain pressure once the bleeding has stopped.  If pressure alone does not stop the bleeding, call the Dermatology Clinic at [phone] or go to the nearest Emergency room.

## 2022-10-09 ENCOUNTER — HEALTH MAINTENANCE LETTER (OUTPATIENT)
Age: 62
End: 2022-10-09

## 2022-10-25 ENCOUNTER — OFFICE VISIT (OUTPATIENT)
Dept: DERMATOLOGY | Facility: CLINIC | Age: 62
End: 2022-10-25
Payer: COMMERCIAL

## 2022-10-25 ENCOUNTER — IMMUNIZATION (OUTPATIENT)
Dept: FAMILY MEDICINE | Facility: CLINIC | Age: 62
End: 2022-10-25
Payer: COMMERCIAL

## 2022-10-25 VITALS — HEART RATE: 67 BPM | DIASTOLIC BLOOD PRESSURE: 76 MMHG | OXYGEN SATURATION: 100 % | SYSTOLIC BLOOD PRESSURE: 118 MMHG

## 2022-10-25 DIAGNOSIS — C44.319 BASAL CELL CARCINOMA (BCC) OF LEFT FOREHEAD: Primary | ICD-10-CM

## 2022-10-25 PROCEDURE — 17311 MOHS 1 STAGE H/N/HF/G: CPT | Performed by: DERMATOLOGY

## 2022-10-25 PROCEDURE — 90682 RIV4 VACC RECOMBINANT DNA IM: CPT

## 2022-10-25 PROCEDURE — 13132 CMPLX RPR F/C/C/M/N/AX/G/H/F: CPT | Performed by: DERMATOLOGY

## 2022-10-25 PROCEDURE — 90471 IMMUNIZATION ADMIN: CPT

## 2022-10-25 PROCEDURE — 99207 PR NO CHARGE LOS: CPT | Performed by: DERMATOLOGY

## 2022-10-25 ASSESSMENT — PAIN SCALES - GENERAL: PAINLEVEL: NO PAIN (0)

## 2022-10-25 NOTE — NURSING NOTE
"Initial /76   Pulse 67   LMP 11/01/2007 (LMP Unknown)   SpO2 100%  Estimated body mass index is 27.64 kg/m  as calculated from the following:    Height as of 10/6/21: 1.626 m (5' 4\").    Weight as of 10/6/21: 73 kg (161 lb). .      "

## 2022-10-25 NOTE — PATIENT INSTRUCTIONS
Sutured Wound Care     [facility]: [phone]    [facility]: [phone]          No strenuous activity for 48 hours. Resume moderate activity in 48 hours. No heavy exercising until you are seen for follow up in one week.     Take Tylenol as needed for discomfort.                         Do not drink alcoholic beverages for 48 hours.     Keep the pressure bandage in place for 24 hours. If the bandage becomes blood tinged or loose, reinforce it with gauze and tape.        (Refer to the reverse side of this page for management of bleeding).    Remove pressure bandage in 24 hours     Leave the flat bandage in place until your follow up appointment.    Keep the bandage dry. Wash around it carefully.    If the tape becomes soiled or starts to come off, reinforce it with additional paper tape.    Do not smoke for 3 weeks; smoking is detrimental to wound healing.    It is normal to have swelling and bruising around the surgical site. The bruising will fade in approximately 10-14 days. Elevate the area to reduce swelling.    Numbness, itchiness and sensitivity to temperature changes can occur after surgery and may take up to 18 months to normalize.      POSSIBLE COMPLICATIONS    BLEEDING:    Leave the bandage in place.  Use tightly rolled up gauze or a cloth to apply direct pressure over the bandage for 20   minutes.  Reapply pressure for an additional 20 minutes if necessary  Call the office or go to the nearest emergency room if pressure fails to stop the bleeding.  Use additional gauze and tape to maintain pressure once the bleeding has stopped.        PAIN:    Post operative pain should slowly get better, never worse.  A severe increase in pain may indicate a problem. Call the office if this occurs.    In case of emergency phone:Dr Og [phone]

## 2022-10-25 NOTE — LETTER
10/25/2022         RE: Carlos Mayo  [address]  [facility] [address]        Dear Colleague,    Thank you for referring your patient, Carlos Mayo, to the [facility]. Please see a copy of my visit note below.    Surgical Office Location :   [facility]  [address] [place] [address]      Carlos Mayo is an extremely pleasant 62 year old year old female patient here today for evaluation and managment of basal cell carcinoma on left forehead.  Patient has no other skin complaints today.  Remainder of the HPI, Meds, PMH, Allergies, FH, and SH was reviewed in chart.      Past Medical History:   Diagnosis Date     Basal cell carcinoma      Esophageal reflux     Resolved     Palpitations     Holter monitor cleared per patient.      Unspecified acute reaction to stress        Past Surgical History:   Procedure Laterality Date     COLONOSCOPY  09/14/2009    Procedure: Colonoscopy Providers: Grey Paulino MD, Isis Dorantes RN     OPEN REDUCTION INTERNAL FIXATION WRIST Right 9/27/2016    Procedure: OPEN REDUCTION INTERNAL FIXATION WRIST;  Surgeon: Sammie Peterson MD;  Location: [facility]     REMOVE HARDWARE WRIST Right 9/14/2017    Procedure: REMOVE HARDWARE WRIST;  Right distal radius removal of hardware;  Surgeon: Sammie Peterson MD;  Location: [facility] OR     SURGICAL HISTORY OF -   1980    Extraction of [place] teeth        Family History   Problem Relation Age of Onset     Hypertension Mother      Depression/Anxiety Mother      Hypertension Father      Cerebrovascular Disease Maternal Grandfather      Anesthesia Reaction No family hx of        Social History     Socioeconomic History     Marital status:      Spouse name: Not on file     Number of children: Not on file     Years of education: Not on file     Highest education level: Not on file   Occupational History     Not on file   Tobacco Use     Smoking status: Never     Smokeless  tobacco: Never   Substance and Sexual Activity     Alcohol use: Yes     Alcohol/week: 1.7 standard drinks     Comment: 2-3 times a month     Drug use: No     Sexual activity: Not Currently     Partners: Male     Birth control/protection: None     Comment: spouse vas   Other Topics Concern     Parent/sibling w/ CABG, MI or angioplasty before 65F 55M? No   Social History Narrative     Not on file     Social Determinants of Health     Financial Resource Strain: Not on file   Food Insecurity: Not on file   Transportation Needs: Not on file   Physical Activity: Not on file   Stress: Not on file   Social Connections: Not on file   Intimate Partner Violence: Not on file   Housing Stability: Not on file       Outpatient Encounter Medications as of 10/25/2022   Medication Sig Dispense Refill     Ascorbic Acid (VITAMIN C PO)        Cholecalciferol (VITAMIN D) 2000 UNITS CAPS        fluocinonide (LIDEX) 0.05 % external cream Apply topically 2 times daily 120 g 6     vitamin B complex with vitamin C (STRESS TAB) tablet Take 1 tablet by mouth daily       No facility-administered encounter medications on file as of 10/25/2022.             O:   NAD, WDWN, Alert & Oriented, Mood & Affect wnl, Vitals stable   Here today alone   /76   Pulse 67   LMP 11/01/2007 (LMP Unknown)   SpO2 100%    General appearance normal   Vitals stable   Alert, oriented and in no acute distress     L forehead 6mm red macule      Eyes: Conjunctivae/lids:Normal     ENT: Lips, buccal mucosa, tongue: normal    MSK:Normal    Cardiovascular: peripheral edema none    Pulm: Breathing Normal    Neuro/Psych: Orientation:Alert and Orientedx3 ; Mood/Affect:normal       A/P:  1. l forehead basal cell carcinoma   MOHS:   Location    The rationale for Mohs surgery was discussed with the patient and consent was obtained.  The risks and benefits as well as alternatives to therapy were discussed, in detail.  Specifically, the risks of infection, scarring, bleeding,  prolonged wound healing, incomplete removal, allergy to anesthesia, nerve injury and recurrence were addressed.  Indication for Mohs was Location. Prior to the procedure, the treatment site was clearly identified and, if available, confirmed with previous photos and confirmed by the patient   All components of the Universal Protocol/PAUSE rule were completed.  The Mohs surgeon operated in two distinct and integrated capacities as the surgeon and pathologist.      The area was prepped with Betasept.  A rim of normal appearing skin was marked circumferentially around the lesion.  The area was infiltrated with local anesthesia.  The tumor was first debulked to remove all clinically apparent tumor.  An incision following the standard Mohs approach was done and the specimen was oriented,mapped and placed in 1 block(s).  Each specimen was then chromacoded and processed in the Mohs laboratory using standard Mohs technique and submitted for frozen section histology.  Frozen section analysis showed no residual tumor but CLEAR MARGINS.      The tumor was excised using standard Mohs technique in 1 stages(s).  CLEAR MARGINS OBTAINED and Final defect size was 1.2 cm.     We discussed the options for wound management in full with the patient including risks/benefits/ possible outcomes.        REPAIR COMPLEX: Because of the tightness of the surrounding skin and Because of the size and full thickness nature of the defect, Because of the tightness of the surrounding skin and Because of the proximity to the brow, a complex closure was planned. After LE anesthesia and prep, Burow's triangles were excised in the relaxed skin tension lines. The wound edges were widely undermined greater than width of the defect on both sides by dissection in the subcutaneous plane until adequate tissue mobility was obtained. Hemostasis was obtained. The wound edges were closed in a layered fashion using Vicryl and Fast Absorbing Plain Gut sutures.  Postoperative length was 3 cm.   EBL minimal; complications none; wound care routine.  The patient was discharged in good condition and will return in one week for wound evaluation.  It was a pleasure speaking to Carlos Mayo today.  Previous clinic notes and pertinent laboratory tests were reviewed prior to Carlos Mayo's visit.  Signs and Symptoms of skin cancer discussed with patient.  Patient encouraged to perform monthly skin exams.  UV precautions reviewed with patient.  Risks of non-melanoma skin cancer discussed with patient   Return to clinic 6 months        Again, thank you for allowing me to participate in the care of your patient.        Sincerely,        Ray Og MD

## 2022-10-25 NOTE — PROGRESS NOTES
Carlos Mayo is an extremely pleasant 62 year old year old female patient here today for evaluation and managment of basal cell carcinoma on left forehead.  Patient has no other skin complaints today.  Remainder of the HPI, Meds, PMH, Allergies, FH, and SH was reviewed in chart.      Past Medical History:   Diagnosis Date     Basal cell carcinoma      Esophageal reflux     Resolved     Palpitations     Holter monitor cleared per patient.      Unspecified acute reaction to stress        Past Surgical History:   Procedure Laterality Date     COLONOSCOPY  09/14/2009    Procedure: Colonoscopy Providers: Grey Paulino MD, Isis Dorantes RN     OPEN REDUCTION INTERNAL FIXATION WRIST Right 9/27/2016    Procedure: OPEN REDUCTION INTERNAL FIXATION WRIST;  Surgeon: Sammie Peterson MD;  [facility]: [facility]     REMOVE HARDWARE WRIST Right 9/14/2017    Procedure: REMOVE HARDWARE WRIST;  Right distal radius removal of hardware;  Surgeon: Sammie Peterson MD;  Location: [facility] OR     SURGICAL HISTORY OF -   1980    Extraction of [place] teeth        Family History   Problem Relation Age of Onset     Hypertension Mother      Depression/Anxiety Mother      Hypertension Father      Cerebrovascular Disease Maternal Grandfather      Anesthesia Reaction No family hx of        Social History     Socioeconomic History     Marital status:      Spouse name: Not on file     Number of children: Not on file     Years of education: Not on file     Highest education level: Not on file   Occupational History     Not on file   Tobacco Use     Smoking status: Never     Smokeless tobacco: Never   Substance and Sexual Activity     Alcohol use: Yes     Alcohol/week: 1.7 standard drinks     Comment: 2-3 times a month     Drug use: No     Sexual activity: Not Currently     Partners: Male     Birth control/protection: None     Comment: spouse vas   Other Topics Concern     Parent/sibling w/ CABG, MI or angioplasty before 65F 55M? No    Social History Narrative     Not on file     Social Determinants of Health     Financial Resource Strain: Not on file   Food Insecurity: Not on file   Transportation Needs: Not on file   Physical Activity: Not on file   Stress: Not on file   Social Connections: Not on file   Intimate Partner Violence: Not on file   Housing Stability: Not on file       Outpatient Encounter Medications as of 10/25/2022   Medication Sig Dispense Refill     Ascorbic Acid (VITAMIN C PO)        Cholecalciferol (VITAMIN D) 2000 UNITS CAPS        fluocinonide (LIDEX) 0.05 % external cream Apply topically 2 times daily 120 g 6     vitamin B complex with vitamin C (STRESS TAB) tablet Take 1 tablet by mouth daily       No facility-administered encounter medications on file as of 10/25/2022.             O:   NAD, WDWN, Alert & Oriented, Mood & Affect wnl, Vitals stable   Here today alone   /76   Pulse 67   LMP 11/01/2007 (LMP Unknown)   SpO2 100%    General appearance normal   Vitals stable   Alert, oriented and in no acute distress     L forehead 6mm red macule      Eyes: Conjunctivae/lids:Normal     ENT: Lips, buccal mucosa, tongue: normal    MSK:Normal    Cardiovascular: peripheral edema none    Pulm: Breathing Normal    Neuro/Psych: Orientation:Alert and Orientedx3 ; Mood/Affect:normal       A/P:  1. l forehead basal cell carcinoma   MOHS:   Location    The rationale for Mohs surgery was discussed with the patient and consent was obtained.  The risks and benefits as well as alternatives to therapy were discussed, in detail.  Specifically, the risks of infection, scarring, bleeding, prolonged wound healing, incomplete removal, allergy to anesthesia, nerve injury and recurrence were addressed.  Indication for Mohs was Location. Prior to the procedure, the treatment site was clearly identified and, if available, confirmed with previous photos and confirmed by the patient   All components of the Universal Protocol/PAUSE rule were  completed.  The Mohs surgeon operated in two distinct and integrated capacities as the surgeon and pathologist.      The area was prepped with Betasept.  A rim of normal appearing skin was marked circumferentially around the lesion.  The area was infiltrated with local anesthesia.  The tumor was first debulked to remove all clinically apparent tumor.  An incision following the standard Mohs approach was done and the specimen was oriented,mapped and placed in 1 block(s).  Each specimen was then chromacoded and processed in the Mohs laboratory using standard Mohs technique and submitted for frozen section histology.  Frozen section analysis showed no residual tumor but CLEAR MARGINS.      The tumor was excised using standard Mohs technique in 1 stages(s).  CLEAR MARGINS OBTAINED and Final defect size was 1.2 cm.     We discussed the options for wound management in full with the patient including risks/benefits/ possible outcomes.        REPAIR COMPLEX: Because of the tightness of the surrounding skin and Because of the size and full thickness nature of the defect, Because of the tightness of the surrounding skin and Because of the proximity to the brow, a complex closure was planned. After LE anesthesia and prep, Burow's triangles were excised in the relaxed skin tension lines. The wound edges were widely undermined greater than width of the defect on both sides by dissection in the subcutaneous plane until adequate tissue mobility was obtained. Hemostasis was obtained. The wound edges were closed in a layered fashion using Vicryl and Fast Absorbing Plain Gut sutures. Postoperative length was 3 cm.   EBL minimal; complications none; wound care routine.  The patient was discharged in good condition and will return in one week for wound evaluation.  It was a pleasure speaking to Carlos Mayo today.  Previous clinic notes and pertinent laboratory tests were reviewed prior to Carlos Mayo's visit.  Signs and  Symptoms of skin cancer discussed with patient.  Patient encouraged to perform monthly skin exams.  UV precautions reviewed with patient.  Risks of non-melanoma skin cancer discussed with patient   Return to clinic 6 months

## 2022-11-01 ENCOUNTER — ALLIED HEALTH/NURSE VISIT (OUTPATIENT)
Dept: DERMATOLOGY | Facility: CLINIC | Age: 62
End: 2022-11-01
Payer: COMMERCIAL

## 2022-11-01 DIAGNOSIS — Z48.01 ENCOUNTER FOR CHANGE OR REMOVAL OF SURGICAL WOUND DRESSING: Primary | ICD-10-CM

## 2022-11-01 PROCEDURE — 99207 PR NO CHARGE NURSE ONLY: CPT

## 2022-11-01 NOTE — PROGRESS NOTES
Pt returned to clinic for post surgery 1 week follow up bandage change. Pt has no complaints, denies pain. Bandage removed Left forehead, area cleansed with normal saline. Site is healing and wound edges approximating well. Reapplied new steri strips and paper tape.    Advised to watch for signs/sx of infection; spreading redness, drainage, odor, fever. Call or report promptly to clinic. Pt given written instructions and informed to rtc as needed. Patient verbalized understanding.       Gretel GUPTA,  CMA

## 2022-11-01 NOTE — PATIENT INSTRUCTIONS
WOUND CARE INSTRUCTIONS  for  ONE WEEK AFTER SURGERY          Leave flat bandage on your skin for one week after today s bandage change.  In one week when you remove the bandage, you may resume your regular skin care routine, including washing with mild soap and water, applying moisturizer, make-up and sunscreen.    If there are any open or bleeding areas at the incision/graft site you should begin to cover the area with a bandage daily as follows:    Clean and dry the area with plain tap water using a Q-tip or sterile gauze pad.  Apply Polysporin or Bacitracin ointment to the open area.  Cover the wound with a band-aid or a sterile non-stick gauze pad and micropore paper tape.         SIGNS OF INFECTION  - If you notice any of these signs of infection, call your doctor right away: expanding redness around the wound.  - Yellow or greenish-colored pus or cloudy wound drainage.    - Red streaking spreading from the wound.  - Increased swelling, tenderness, or pain around the wound.   - Fever.    Please remember that yellow and clear drainage from a wound can be normal and related to normal wound healing.  Isolated drainage from a wound without a combination of the above features does not indicate infection.       *Once the bandages are removed, the scar will be red and firm (especially in the lip/chin area). This is normal and will fade in time. It might take 6-12 months for this to happen.     *Massaging the area will help the scar soften and fade quicker. Begin to massage the area one month after the bandages have been removed. To massage apply pressure directly and firmly over the scar with the fingertips and move in a circular motion. Massage the area for a few minutes several times a day. Continue to massage the site for several months.    *Approximately 6-8 weeks after surgery it is not uncommon to see the formation of  tender pimple-like  bump along the scar. This is normal. As the scar continues to mature and  the stitches underneath the skin begin to dissolve, this might occur. Do not pick or squeeze, this will resolve on it s own. Should one break open producing a small amount of drainage, apply Polysporin or Bacitracin ointment a few times a day until the wound is completely healed.    *Numbness in the surgical area is expected. It might take 12-18 months for the feeling to return to normal. During this time sensations of itchiness, tingling and occasional sharp pains might be noted. These feelings are normal and will subside once the nerves have completely healed.         IN CASE OF EMERGENCY: Dr Og [phone]     If you were seen in [place] call: [phone]    If you were seen in [place] call: [phone]

## 2022-11-26 ENCOUNTER — HEALTH MAINTENANCE LETTER (OUTPATIENT)
Age: 62
End: 2022-11-26

## 2023-03-01 ENCOUNTER — OFFICE VISIT (OUTPATIENT)
Dept: DERMATOLOGY | Facility: CLINIC | Age: 63
End: 2023-03-01
Payer: COMMERCIAL

## 2023-03-01 DIAGNOSIS — L82.1 SEBORRHEIC KERATOSIS: ICD-10-CM

## 2023-03-01 DIAGNOSIS — D18.01 ANGIOMA OF SKIN: ICD-10-CM

## 2023-03-01 DIAGNOSIS — Z85.828 HISTORY OF SKIN CANCER: ICD-10-CM

## 2023-03-01 DIAGNOSIS — D23.9 DERMAL NEVUS: ICD-10-CM

## 2023-03-01 DIAGNOSIS — L81.4 LENTIGO: Primary | ICD-10-CM

## 2023-03-01 PROCEDURE — 99213 OFFICE O/P EST LOW 20 MIN: CPT | Performed by: DERMATOLOGY

## 2023-03-01 ASSESSMENT — PAIN SCALES - GENERAL: PAINLEVEL: NO PAIN (0)

## 2023-03-01 NOTE — PROGRESS NOTES
Carlos Mayo is an extremely pleasant 62 year old year old female patient here today for hx of non-melanoma skin cancer.  She denies any new or changing skin lesions.  Patient has no other skin complaints today.  Remainder of the HPI, Meds, PMH, Allergies, FH, and SH was reviewed in chart.      Past Medical History:   Diagnosis Date     Basal cell carcinoma      Esophageal reflux     Resolved     Palpitations     Holter monitor cleared per patient.      Unspecified acute reaction to stress        Past Surgical History:   Procedure Laterality Date     COLONOSCOPY  09/14/2009    Procedure: Colonoscopy Providers: Grey Paulino MD, Isis Dorantes RN     OPEN REDUCTION INTERNAL FIXATION WRIST Right 9/27/2016    Procedure: OPEN REDUCTION INTERNAL FIXATION WRIST;  Surgeon: Sammie Peterson MD;  Location: [facility]     REMOVE HARDWARE WRIST Right 9/14/2017    Procedure: REMOVE HARDWARE WRIST;  Right distal radius removal of hardware;  Surgeon: Sammie Peterson MD;  Location: [facility] OR     SURGICAL HISTORY OF -   1980    Extraction of [place] teeth        Family History   Problem Relation Age of Onset     Hypertension Mother      Depression/Anxiety Mother      Hypertension Father      Cerebrovascular Disease Maternal Grandfather      Anesthesia Reaction No family hx of        Social History     Socioeconomic History     Marital status:      Spouse name: Not on file     Number of children: Not on file     Years of education: Not on file     Highest education level: Not on file   Occupational History     Not on file   Tobacco Use     Smoking status: Never     Smokeless tobacco: Never   Substance and Sexual Activity     Alcohol use: Yes     Alcohol/week: 1.7 standard drinks     Comment: 2-3 times a month     Drug use: No     Sexual activity: Not Currently     Partners: Male     Birth control/protection: None     Comment: spouse vas   Other Topics Concern     Parent/sibling w/ CABG, MI or angioplasty before 65F 55M?  No   Social History Narrative     Not on file     Social Determinants of Health     Financial Resource Strain: Not on file   Food Insecurity: Not on file   Transportation Needs: Not on file   Physical Activity: Not on file   Stress: Not on file   Social Connections: Not on file   Intimate Partner Violence: Not on file   Housing Stability: Not on file       Outpatient Encounter Medications as of 3/1/2023   Medication Sig Dispense Refill     Ascorbic Acid (VITAMIN C PO)        Cholecalciferol (VITAMIN D) 2000 UNITS CAPS        vitamin B complex with vitamin C (STRESS TAB) tablet Take 1 tablet by mouth daily       fluocinonide (LIDEX) 0.05 % external cream Apply topically 2 times daily (Patient not taking: Reported on 3/1/2023) 120 g 6     No facility-administered encounter medications on file as of 3/1/2023.             O:   NAD, WDWN, Alert & Oriented, Mood & Affect wnl, Vitals stable   Here today alone    General appearance normal   Vitals stable   Alert, oriented and in no acute distress        Stuck on papules and brown macules on trunk and ext   Red papules on trunk  Flesh colored papules on trunk     The remainder of the full exam was normal; the following areas were examined:  conjunctiva/lids, , neck, peripheral vascular system, abdomen, lymph nodes, digits/nails, eccrine and apocrine glands, scalp/hair, face, neck, chest, abdomen, buttocks, back, RUE, LUE, RLE, LLE       Eyes: Conjunctivae/lids:Normal     ENT: Lips, buccal mucosa, tongue: normal    MSK:Normal    Cardiovascular: peripheral edema none    Pulm: Breathing Normal    Lymph Nodes: No Head and Neck Lymphadenopathy     Neuro/Psych: Orientation:Alert and Orientedx3 ; Mood/Affect:normal       A/P:  1. Seborrheic keratosis, lentigo, angioma, dermal nevus, hx of non-melanoma skin cancer   It was a pleasure speaking to Carlos Mayo today.  Previous clinic notes and pertinent laboratory tests were reviewed prior to Carlos Mayo's visit.  Nature of  benign skin lesions dicussed with patient.  Signs and Symptoms of skin cancer discussed with patient.  Patient encouraged to perform monthly skin exams.  UV precautions reviewed with patient.  Risks of non-melanoma skin cancer discussed with patient   Return to clinic 12 months

## 2023-03-01 NOTE — LETTER
3/1/2023         RE: Carlos Mayo  [address]  [facility] [address]        Dear Colleague,    Thank you for referring your patient, Carlos Mayo, to the [facility]. Please see a copy of my visit note below.    Carlos Mayo is an extremely pleasant 62 year old year old female patient here today for hx of non-melanoma skin cancer.  She denies any new or changing skin lesions.  Patient has no other skin complaints today.  Remainder of the HPI, Meds, PMH, Allergies, FH, and SH was reviewed in chart.      Past Medical History:   Diagnosis Date     Basal cell carcinoma      Esophageal reflux     Resolved     Palpitations     Holter monitor cleared per patient.      Unspecified acute reaction to stress        Past Surgical History:   Procedure Laterality Date     COLONOSCOPY  09/14/2009    Procedure: Colonoscopy Providers: Grey Paulino MD, Isis Dorantes RN     OPEN REDUCTION INTERNAL FIXATION WRIST Right 9/27/2016    Procedure: OPEN REDUCTION INTERNAL FIXATION WRIST;  Surgeon: Sammie Peterson MD;  [facility]: [facility]     REMOVE HARDWARE WRIST Right 9/14/2017    Procedure: REMOVE HARDWARE WRIST;  Right distal radius removal of hardware;  Surgeon: Sammie Peterson MD;  Location: [facility] OR     SURGICAL HISTORY OF -   1980    Extraction of [place] teeth        Family History   Problem Relation Age of Onset     Hypertension Mother      Depression/Anxiety Mother      Hypertension Father      Cerebrovascular Disease Maternal Grandfather      Anesthesia Reaction No family hx of        Social History     Socioeconomic History     Marital status:      Spouse name: Not on file     Number of children: Not on file     Years of education: Not on file     Highest education level: Not on file   Occupational History     Not on file   Tobacco Use     Smoking status: Never     Smokeless tobacco: Never   Substance and Sexual Activity     Alcohol use: Yes     Alcohol/week: 1.7  standard drinks     Comment: 2-3 times a month     Drug use: No     Sexual activity: Not Currently     Partners: Male     Birth control/protection: None     Comment: spouse vas   Other Topics Concern     Parent/sibling w/ CABG, MI or angioplasty before 65F 55M? No   Social History Narrative     Not on file     Social Determinants of Health     Financial Resource Strain: Not on file   Food Insecurity: Not on file   Transportation Needs: Not on file   Physical Activity: Not on file   Stress: Not on file   Social Connections: Not on file   Intimate Partner Violence: Not on file   Housing Stability: Not on file       Outpatient Encounter Medications as of 3/1/2023   Medication Sig Dispense Refill     Ascorbic Acid (VITAMIN C PO)        Cholecalciferol (VITAMIN D) 2000 UNITS CAPS        vitamin B complex with vitamin C (STRESS TAB) tablet Take 1 tablet by mouth daily       fluocinonide (LIDEX) 0.05 % external cream Apply topically 2 times daily (Patient not taking: Reported on 3/1/2023) 120 g 6     No facility-administered encounter medications on file as of 3/1/2023.             O:   NAD, WDWN, Alert & Oriented, Mood & Affect wnl, Vitals stable   Here today alone    General appearance normal   Vitals stable   Alert, oriented and in no acute distress        Stuck on papules and brown macules on trunk and ext   Red papules on trunk  Flesh colored papules on trunk     The remainder of the full exam was normal; the following areas were examined:  conjunctiva/lids, , neck, peripheral vascular system, abdomen, lymph nodes, digits/nails, eccrine and apocrine glands, scalp/hair, face, neck, chest, abdomen, buttocks, back, RUE, LUE, RLE, LLE       Eyes: Conjunctivae/lids:Normal     ENT: Lips, buccal mucosa, tongue: normal    MSK:Normal    Cardiovascular: peripheral edema none    Pulm: Breathing Normal    Lymph Nodes: No Head and Neck Lymphadenopathy     Neuro/Psych: Orientation:Alert and Orientedx3 ; Mood/Affect:normal        A/P:  1. Seborrheic keratosis, lentigo, angioma, dermal nevus, hx of non-melanoma skin cancer   It was a pleasure speaking to Carlos Mayo today.  Previous clinic notes and pertinent laboratory tests were reviewed prior to Carlos Mayo's visit.  Nature of benign skin lesions dicussed with patient.  Signs and Symptoms of skin cancer discussed with patient.  Patient encouraged to perform monthly skin exams.  UV precautions reviewed with patient.  Risks of non-melanoma skin cancer discussed with patient   Return to clinic 12 months        Again, thank you for allowing me to participate in the care of your patient.        Sincerely,        aRy Og MD

## 2023-08-19 ENCOUNTER — HEALTH MAINTENANCE LETTER (OUTPATIENT)
Age: 63
End: 2023-08-19

## 2023-10-28 ENCOUNTER — HEALTH MAINTENANCE LETTER (OUTPATIENT)
Age: 63
End: 2023-10-28

## 2023-11-01 ENCOUNTER — IMMUNIZATION (OUTPATIENT)
Dept: FAMILY MEDICINE | Facility: CLINIC | Age: 63
End: 2023-11-01
Payer: MEDICARE

## 2023-11-01 DIAGNOSIS — Z29.11 NEED FOR VACCINATION AGAINST RESPIRATORY SYNCYTIAL VIRUS: ICD-10-CM

## 2023-11-01 DIAGNOSIS — Z23 ENCOUNTER FOR IMMUNIZATION: Primary | ICD-10-CM

## 2023-11-01 PROCEDURE — 90662 IIV NO PRSV INCREASED AG IM: CPT

## 2023-11-01 PROCEDURE — G0008 ADMIN INFLUENZA VIRUS VAC: HCPCS

## 2023-11-01 NOTE — PROGRESS NOTES
Prior to immunization administration, verified patients identity using patient s name and date of birth. Please see Immunization Activity for additional information.     Screening Questionnaire for Adult Immunization    Are you sick today?   No   Do you have allergies to medications, food, a vaccine component or latex?   No   Have you ever had a serious reaction after receiving a vaccination?   No   Do you have a long-term health problem with heart, lung, kidney, or metabolic disease (e.g., diabetes), asthma, a blood disorder, no spleen, complement component deficiency, a cochlear implant, or a spinal fluid leak?  Are you on long-term aspirin therapy?   No   Do you have cancer, leukemia, HIV/AIDS, or any other immune system problem?   No   Do you have a parent, brother, or sister with an immune system problem?   No   In the past 3 months, have you taken medications that affect  your immune system, such as prednisone, other steroids, or anticancer drugs; drugs for the treatment of rheumatoid arthritis, Crohn s disease, or psoriasis; or have you had radiation treatments?   No   Have you had a seizure, or a brain or other nervous system problem?   No   During the past year, have you received a transfusion of blood or blood    products, or been given immune (gamma) globulin or antiviral drug?   No   For women: Are you pregnant or is there a chance you could become       pregnant during the next month?   No   Have you received any vaccinations in the past 4 weeks?   No     Immunization questionnaire answers were all negative.    I have reviewed the following standing orders:   This patient is due and qualifies for the Influenza vaccine.    Click here for Influenza Vaccine Standing Order    I have reviewed the vaccines inclusion and exclusion criteria; No concerns regarding eligibility.     Patient instructed to remain in clinic for 15 minutes afterwards, and to report any adverse reactions.     Screening performed by  Tatianna Vital MA on 11/1/2023 at 10:23 AM.

## 2023-11-09 NOTE — PROGRESS NOTES
SUBJECTIVE:   CC: Carlos Mayo is an 61 year old woman who presents for preventive health visit.     Patient has been advised of split billing requirements and indicates understanding: Yes     Healthy Habits:     Getting at least 3 servings of Calcium per day:  NO    Bi-annual eye exam:  Yes    Dental care twice a year:  Yes    Sleep apnea or symptoms of sleep apnea:  Daytime drowsiness    Diet:  Regular (no restrictions)    Frequency of exercise:  4-5 days/week    Duration of exercise:  30-45 minutes    Taking medications regularly:  No    Medication side effects:  Not applicable    PHQ-2 Total Score: 0    Additional concerns today:  Yes        Today's PHQ-2 Score:   PHQ-2 ( 1999 Pfizer) 10/5/2021   Q1: Little interest or pleasure in doing things 0   Q2: Feeling down, depressed or hopeless 0   PHQ-2 Score 0   Q1: Little interest or pleasure in doing things Not at all   Q2: Feeling down, depressed or hopeless Not at all   PHQ-2 Score 0     Abuse: Current or Past (Physical, Sexual or Emotional) - No  Do you feel safe in your environment? Yes    Have you ever done Advance Care Planning? (For example, a Health Directive, POLST, or a discussion with a medical provider or your loved ones about your wishes): No, advance care planning information given to patient to review.  Patient plans to discuss their wishes with loved ones or provider.      Social History     Tobacco Use     Smoking status: Never Smoker     Smokeless tobacco: Never Used   Substance Use Topics     Alcohol use: Yes     Alcohol/week: 1.7 standard drinks     Comment: 2-3 times a month     If you drink alcohol do you typically have >3 drinks per day or >7 drinks per week? No    Alcohol Use 10/5/2021   Prescreen: >3 drinks/day or >7 drinks/week? No   Prescreen: >3 drinks/day or >7 drinks/week? -      Reviewed orders with patient.  Reviewed health maintenance and updated orders accordingly - Yes  BP Readings from Last 3 Encounters:   08/31/21 126/79  \" Your patient (Jeff Sow, : 2008) was discharged from [facility], Inpatient Pediatrics Unit on (2023). A Discharge Summary will be faxed to your office. The attending physician is (Mel Andrew), who can be reached via Sococo. Thank you!\"      08/24/21 139/80   08/10/21 115/70    Wt Readings from Last 3 Encounters:   07/01/21 71.8 kg (158 lb 4 oz)   11/11/19 76.4 kg (168 lb 8 oz)   10/23/19 75.9 kg (167 lb 6.4 oz)           Breast Cancer Screening:    Breast CA Risk Assessment (FHS-7) 10/5/2021   Do you have a family history of breast, colon, or ovarian cancer? No / Unknown         Mammogram Screening: Recommended mammography every 1-2 years with patient discussion and risk factor consideration  Pertinent mammograms are reviewed under the imaging tab.    History of abnormal Pap smear: NO - age 30-65 PAP every 5 years with negative HPV co-testing recommended  PAP / HPV Latest Ref Rng & Units 5/13/2019 2/12/2015 11/22/2011   PAP (Historical) - NIL NIL NIL   HPV16 NEG:Negative Negative - -   HPV18 NEG:Negative Negative - -   HRHPV NEG:Negative Negative - -     Reviewed and updated as needed this visit by clinical staff                 Reviewed and updated as needed this visit by Provider                    Review of Systems   Constitutional: Negative for chills and fever.   HENT: Negative for congestion, ear pain, hearing loss and sore throat.    Eyes: Negative for pain and visual disturbance.   Respiratory: Negative for cough and shortness of breath.    Cardiovascular: Negative for chest pain, palpitations and peripheral edema.   Gastrointestinal: Negative for abdominal pain, constipation, diarrhea, heartburn, hematochezia and nausea.   Breasts:  Negative for tenderness, breast mass and discharge.   Genitourinary: Negative for dysuria, frequency, genital sores, hematuria, pelvic pain, urgency, vaginal bleeding and vaginal discharge.   Musculoskeletal: Positive for arthralgias. Negative for joint swelling and myalgias.   Skin: Negative for rash.   Neurological: Negative for dizziness, weakness, headaches and paresthesias.   Psychiatric/Behavioral: Negative for mood changes. The patient is not nervous/anxious.           OBJECTIVE:   LMP 11/01/2007 (LMP  "Unknown)   Physical Exam   /74   Pulse 78   Temp 97.8  F (36.6  C) (Tympanic)   Resp 18   Ht 1.626 m (5' 4\")   Wt 73 kg (161 lb)   LMP 11/01/2007 (LMP Unknown)   SpO2 100%   Breastfeeding No   BMI 27.64 kg/m     GENERAL: Healthy, alert and no distress  EYES: Eyes grossly normal to inspection, conjunctivae and sclerae normal  RESP: Lungs clear to auscultation - no rales, rhonchi or wheezes  CV: Regular rate and rhythm, normal S1 S2, no murmur  MS: No gross musculoskeletal defects noted, no edema  NEURO: Normal strength and tone, mentation intact and speech normal  PSYCH: Mentation appears normal, affect normal/bright     Diagnostic Test Results:  Labs reviewed in Epic  Results for orders placed or performed in visit on 10/06/21 (from the past 24 hour(s))   Basic metabolic panel  (Ca, Cl, CO2, Creat, Gluc, K, Na, BUN)   Result Value Ref Range    Sodium 137 133 - 144 mmol/L    Potassium 5.5 (H) 3.4 - 5.3 mmol/L    Chloride 103 94 - 109 mmol/L    Carbon Dioxide (CO2) 31 20 - 32 mmol/L    Anion Gap 3 3 - 14 mmol/L    Urea Nitrogen 14 7 - 30 mg/dL    Creatinine 0.82 0.52 - 1.04 mg/dL    Calcium 9.8 8.5 - 10.1 mg/dL    Glucose 107 (H) 70 - 99 mg/dL    GFR Estimate 76 >60 mL/min/1.73m2   CBC with platelets   Result Value Ref Range    WBC Count 7.8 4.0 - 11.0 10e3/uL    RBC Count 4.38 3.80 - 5.20 10e6/uL    Hemoglobin 13.2 11.7 - 15.7 g/dL    Hematocrit 41.3 35.0 - 47.0 %    MCV 94 78 - 100 fL    MCH 30.1 26.5 - 33.0 pg    MCHC 32.0 31.5 - 36.5 g/dL    RDW 12.5 10.0 - 15.0 %    Platelet Count 277 150 - 450 10e3/uL   Hemoglobin A1c   Result Value Ref Range    Hemoglobin A1C 4.8 0.0 - 5.6 %   Lipid panel reflex to direct LDL Non-fasting   Result Value Ref Range    Cholesterol 231 (H) <200 mg/dL    Triglycerides 77 <150 mg/dL    Direct Measure HDL 98 >=50 mg/dL    LDL Cholesterol Calculated 118 (H) <=100 mg/dL    Non HDL Cholesterol 133 (H) <130 mg/dL    Patient Fasting > 8hrs? No     Narrative    " Cholesterol  Desirable:  <200 mg/dL    Triglycerides  Normal:  Less than 150 mg/dL  Borderline High:  150-199 mg/dL  High:  200-499 mg/dL  Very High:  Greater than or equal to 500 mg/dL    Direct Measure HDL  Female:  Greater than or equal to 50 mg/dL   Male:  Greater than or equal to 40 mg/dL    LDL Cholesterol  Desirable:  <100mg/dL  Above Desirable:  100-129 mg/dL   Borderline High:  130-159 mg/dL   High:  160-189 mg/dL   Very High:  >= 190 mg/dL    Non HDL Cholesterol  Desirable:  130 mg/dL  Above Desirable:  130-159 mg/dL  Borderline High:  160-189 mg/dL  High:  190-219 mg/dL  Very High:  Greater than or equal to 220 mg/dL       ASSESSMENT/PLAN:   (Z00.00) Routine general medical examination at a health care facility  (primary encounter diagnosis)  Comment: Reviewed the need for preventative cares and screening.    Plan: REVIEW OF HEALTH MAINTENANCE PROTOCOL ORDERS        Advise yearly physical.    (R00.2) PALPITATIONS - rare  Comment: Still has occasional palpitations, tolerable.  Normal kidney function, no signs of anemia.  Slightly elevated potassium probably secondary hemolysis.  No reason for her to have hyperkalemia.  Plan: CBC with platelets, Basic metabolic panel  (Ca,        Cl, CO2, Creat, Gluc, K, Na, BUN)        Monitor.    (Z12.11) Screen for colon cancer is Mr. Is concerned especially as these are serious questions patient cellulitis is  Plan: Adult Gastro Ref - Procedure Only       (Z13.220) Screening for hyperlipidemia  Comment:  elevated panel but excellent HDL.  No strong indication for statin therapy at this time.  P q this was part ofJanuary 6 s to investigate January 6: Lipid panel reflex to direct LDL Non-fasting  Condition dizziness    (G47.00) Insomnia, unspecified type  Comment: Notes insomnia, interrupted sleep, snoring.  Prior sleep study was equivocal.  Plan: SLEEP EVALUATION & MANAGEMENT REFERRAL - ADULT       We will refer for consultation with a sleep center.    (Z23) Flu vaccine  "need  Plan: INFLUENZA QUAD, RECOMBINANT, P-FREE (RIV4)         (FLUBLOK)      (R73.09) Abnormal glucose  Comment: No signs of insulin resistance.  Plan: Hemoglobin A1c    Patient Instructions     Preventive Health Recommendations  Female Ages 50 - 64    Overall, you're doing well.      Blood tests today.     See our sleep specialist: [phone].     Our office will contact you about setting up a colonoscopy.     Yearly physical.     For now, I don't think you need do anything with the uterine prolapse without symptoms.     Yearly dermatologist.       Patient has been advised of split billing requirements and indicates understanding: Yes  COUNSELING:  Reviewed preventive health counseling, as reflected in patient instructions       Regular exercise       Healthy diet/nutrition       Osteoporosis prevention/bone health       Colon cancer screening    Estimated body mass index is 26.33 kg/m  as calculated from the following:    Height as of 7/1/21: 1.651 m (5' 5\").    Weight as of 7/1/21: 71.8 kg (158 lb 4 oz).    Weight management plan: Discussed healthy diet and exercise guidelines    She reports that she has never smoked. She has never used smokeless tobacco.      Counseling Resources:  ATP IV Guidelines  Pooled Cohorts Equation Calculator  Breast Cancer Risk Calculator  BRCA-Related Cancer Risk Assessment: FHS-7 Tool  FRAX Risk Assessment  ICSI Preventive Guidelines  Dietary Guidelines for Americans, 2010  USDA's MyPlate  ASA Prophylaxis  Lung CA Screening    Mayelin Briggs MD  [facility]  "

## 2023-12-08 ENCOUNTER — NURSE TRIAGE (OUTPATIENT)
Dept: NURSING | Facility: CLINIC | Age: 63
End: 2023-12-08
Payer: MEDICARE

## 2023-12-08 ENCOUNTER — OFFICE VISIT (OUTPATIENT)
Dept: FAMILY MEDICINE | Facility: CLINIC | Age: 63
End: 2023-12-08
Payer: MEDICARE

## 2023-12-08 ENCOUNTER — MYC MEDICAL ADVICE (OUTPATIENT)
Dept: FAMILY MEDICINE | Facility: CLINIC | Age: 63
End: 2023-12-08

## 2023-12-08 ENCOUNTER — ANCILLARY PROCEDURE (OUTPATIENT)
Dept: GENERAL RADIOLOGY | Facility: CLINIC | Age: 63
End: 2023-12-08
Attending: FAMILY MEDICINE
Payer: MEDICARE

## 2023-12-08 VITALS
SYSTOLIC BLOOD PRESSURE: 119 MMHG | TEMPERATURE: 96.8 F | HEIGHT: 64 IN | HEART RATE: 89 BPM | DIASTOLIC BLOOD PRESSURE: 75 MMHG | BODY MASS INDEX: 23.03 KG/M2 | RESPIRATION RATE: 18 BRPM | OXYGEN SATURATION: 97 % | WEIGHT: 134.9 LBS

## 2023-12-08 DIAGNOSIS — R06.2 WHEEZING: ICD-10-CM

## 2023-12-08 DIAGNOSIS — R05.1 ACUTE COUGH: ICD-10-CM

## 2023-12-08 DIAGNOSIS — R06.09 DYSPNEA ON EXERTION: ICD-10-CM

## 2023-12-08 DIAGNOSIS — J18.9 COMMUNITY ACQUIRED PNEUMONIA OF LEFT LOWER LOBE OF LUNG: ICD-10-CM

## 2023-12-08 DIAGNOSIS — R05.1 ACUTE COUGH: Primary | ICD-10-CM

## 2023-12-08 PROCEDURE — 71046 X-RAY EXAM CHEST 2 VIEWS: CPT | Mod: TC | Performed by: RADIOLOGY

## 2023-12-08 PROCEDURE — 99214 OFFICE O/P EST MOD 30 MIN: CPT | Performed by: FAMILY MEDICINE

## 2023-12-08 RX ORDER — AZITHROMYCIN 250 MG/1
TABLET, FILM COATED ORAL
Qty: 6 TABLET | Refills: 0 | Status: SHIPPED | OUTPATIENT
Start: 2023-12-08 | End: 2023-12-13

## 2023-12-08 RX ORDER — RESPIRATORY SYNCYTIAL VIRUS VACCINE 120MCG/0.5
0.5 KIT INTRAMUSCULAR ONCE
Qty: 1 EACH | Refills: 0 | Status: CANCELLED | OUTPATIENT
Start: 2023-12-08 | End: 2023-12-08

## 2023-12-08 RX ORDER — ALBUTEROL SULFATE 90 UG/1
AEROSOL, METERED RESPIRATORY (INHALATION)
Qty: 18 G | Refills: 0 | Status: SHIPPED | OUTPATIENT
Start: 2023-12-08

## 2023-12-08 NOTE — COMMUNITY RESOURCES LIST (ENGLISH)
12/08/2023   [facility]  N/A  For questions about this resource list or additional care needs, please contact your primary care clinic or care manager.  Phone: [phone]   Email: N/A   Address: [address] [place] [address]   Hours: N/A        Hotlines and Helplines       Hotline - Housing crisis  1  [facility] Housing Resource Line Distance: 12.17 miles      Phone/Virtual   [address] [place] [address]  Language: English  Hours: Mon - Sun Open 24 Hours   Phone: (179) 123-6143 Website: https://www.[facility] [facility]./2689/Basic-Needs     2  [facility] Distance: 12.81 miles      Phone/Virtual   [address] [place] [address]  Language: English  Hours: Mon - Sun Open 24 Hours   Phone: (186) 143-4446 Email: communications@[facility].org Website: https://[facility].org/oursaviourshousing/          Housing       Coordinated Entry access point  3  [facility]  Office - [facility] Distance: 4.07 miles      Phone/Virtual   [address] [place] [address]  Language: English  Hours: Mon - Fri 8:30 AM - 12:00 PM , Mon - Fri 1:00 PM - 4:00 PM  Fees: Free   Phone: (307) 622-7354 Ext.2 Email: narendra@[facility].EZprints.comDelaware Psychiatric CenterioSemantics.org Website: https://www.[facility] Fort WorthioSemanticsusa.org/usn/     4  [facility] Distance: 12.92 miles      In-Person, Phone/Virtual   [address] [place] [address]  Language: English  Hours: Mon - Fri 8:30 AM - 4:30 PM  Fees: Free   Phone: (528) 180-3626 Email: info@mncare.org Website: https://www.[facility].org/locations/[facility]-clinic/     Drop-in center or day shelter  5  [facility] Distance: 11.86 miles      In-Person   [address] [place] [address]  Language: English, Hmong, Kittitian, Welsh  Hours: Mon - Thu 8:30 AM - 4:30 PM , Sat - Sun 9:00 AM - 12:00 PM  Fees: Free   Phone: (825) 470-6940 Email: info@[employer]Plan[employer] Minerss.org Website:  https://sharingWakeMed Cary Hospitalcaringhands.org/     6  [facility] [place] [facility] [place] [facility] Distance: 12.43 miles      In-Person   [address] [place] [address]  Language: English, Maltese, Guatemalan  Hours: Mon - Sat 7:00 AM - 3:00 PM  Fees: Free, Self Pay   Phone: (288) 405-5241 Email: info@[employer] Website: https://www.[employer].Science Exchange/locations/opportunity-center/     Housing search assistance  7  [facility] [place] ([facility]) - [facility] Rental Housing Distance: 4.06 miles      In-Person, Phone/Virtual   [address] [place] [address]  Language: English  Hours: Mon - Fri 8:00 AM - 4:30 PM  Fees: Free   Phone: (586) 484-5491 Email: accap@accap.org Website: http://www.accap.Science Exchange     8  Neighborhood Assistance [employer] Distance: 8.45 miles      Phone/Virtual   [address] [place] [address]  Language: English, Guatemalan  Hours: Mon - Fri 9:00 AM - 5:00 PM  Fees: Free   Phone: (768) 411-8176 Email: services@[employer] Website: https://www.Win the Planet     Shelter for families  9  [facility] Distance: 8.18 miles      In-Person   [address] [place] [address]  Language: English  Hours: Mon - Fri 3:00 PM - 9:00 AM , Sat - Sun Open 24 Hours  Fees: Free   Phone: (721) 576-7941 Ext.1 Website: https://www.saintandrews.org/2020/07/03/emergency-family-shelter/     Shelter for individuals  10  [facility] Distance: 12.09 miles      In-Person   [address] [place] [address]  Language: English  Hours: Mon - Fri 4:00 PM - 9:00 AM  Fees: Free   Phone: (959) 765-7576 Email: lorie@[facility].org Website: https://centralusa.[facility].org/northern/[facility]/     11  [facility] Distance: 12.81 miles      In-Person   [address] [place] [address]  Language: English  Hours: Mon - Sun Open 24 Hours  Fees: Free   Phone:  (220) 650-7128 Email: communications@oscs-mn.org Website: https://oscs-mn.org/oursaviourshousing/          Important Numbers & Websites       Emergency Services   911  [facility] Services   311  Poison Control   (888) 837-7155  Suicide Prevention Lifeline   (706) 386-6465 (TALK)  Child Abuse Hotline   (749) 903-6615 (4-A-Child)  Sexual Assault Hotline   (198) 578-5596 (HOPE)  National Runaway Safeline   (956) 454-6591 (RUNAWAY)  All-Options Talkline   (505) 787-9647  Substance Abuse Referral   (501) 495-9514 (HELP)

## 2023-12-08 NOTE — PATIENT INSTRUCTIONS
- Chest xray showed no infection  - albuterol (PROAIR HFA/PROVENTIL HFA/VENTOLIN HFA) 108 (90 Base) MCG/ACT inhaler; Albuterol inhaler 2 puffs to be used 5 to 10 minutes before exercise or every 4-6 hours as needed for wheezing or shortness of breath    - call or message if symptoms are not improving in 1 to 2 weeks

## 2023-12-08 NOTE — COMMUNITY RESOURCES LIST (ENGLISH)
12/08/2023   [facility]  [facility]A  For questions about this resource list or additional care needs, please contact your primary care clinic or care manager.  Phone: [phone]   Email: N/A   Address: [address] [place] [address]   Hours: N/A        Hotlines and Helplines       Hotline - Housing crisis  1  [facility] Housing Resource Line Distance: 12.17 miles      Phone/Virtual   [address] [place] [address]  Language: English  Hours: Mon - Sun Open 24 Hours   Phone: (487) 544-9525 Website: https://www.[facility]./2689/Basic-Needs     2  [facility] Distance: 12.81 miles      Phone/Virtual   [address] [place] [address]  Language: English  Hours: Mon - Sun Open 24 Hours   Phone: (531) 474-9440 Email: communications@[facility].org Website: https://[facility]-mn.org/oursaviourshousing/          Housing       Coordinated Entry access point  3  [facility]  Office - [facility] Distance: 4.07 miles      Phone/Virtual   [address] [place] [address]  Language: English  Hours: Mon - Fri 8:30 AM - 12:00 PM , Mon - Fri 1:00 PM - 4:00 PM  Fees: Free   Phone: (767) 837-6585 Ext.2 Email: narendra@[facility].TulokoWil[facility] HospitalWear My Tags.org Website: https://www.[facility] TexasWear My Tagsusa.org/usn/     4  [facility] Distance: 12.92 miles      In-Person, Phone/Virtual   [address] [place] [address]  Language: English  Hours: Mon - Fri 8:30 AM - 4:30 PM  Fees: Free   Phone: (423) 518-5047 Email: info@mncare.org Website: https://www.[facility].org/locations/[facility]-clinic/     Drop-in center or day shelter  5  [facility] Distance: 11.86 miles      In-Person   [address] [place] [address]  Language: English, Hmong, Micronesian, Omani  Hours: Mon - Thu 8:30 AM - 4:30 PM , Sat - Sun 9:00 AM - 12:00 PM  Fees: Free   Phone: (647) 532-9837 Email: info@legalPADingPetnets.org Website:  https://sharingSloop Memorial Hospitalcaringhands.org/     6  [facility] [place] and [place] [facility] Distance: 12.43 miles      In-Person   [address] [place] [address]  Language: English, Liberian, St Helenian  Hours: Mon - Sat 7:00 AM - 3:00 PM  Fees: Free, Self Pay   Phone: (727) 198-5595 Email: info@[employer] Website: https://www.MedicaMetrix.[facility]/locations/opportunity-center/     Housing search assistance  7  [facility] [place] by [facility] ([facility]) - [facility] Rental Housing Distance: 4.06 miles      In-Person, Phone/Virtual   [address] [place] [address]  Language: English  Hours: Mon - Fri 8:00 AM - 4:30 PM  Fees: Free   Phone: (497) 339-1255 Email: accap@accap.org Website: http://www.accap.[facility]     8  Neighborhood Assistance [employer] Distance: 8.45 miles      Phone/Virtual   [address] [place] [address]  Language: English, St Helenian  Hours: Mon - Fri 9:00 AM - 5:00 PM  Fees: Free   Phone: (868) 932-1760 Email: services@[employer] Website: https://www.INRIX     Shelter for families  9  [facility] Distance: 8.18 miles      In-Person   [address] [place] [address]  Language: English  Hours: Mon - Fri 3:00 PM - 9:00 AM , Sat - Sun Open 24 Hours  Fees: Free   Phone: (455) 210-9632 Ext.1 Website: https://www.saintandrews.org/2020/07/03/emergency-family-shelter/     Shelter for individuals  10  [facility] Distance: 12.09 miles      In-Person   [address] [place] [address]  Language: English  Hours: Mon - Fri 4:00 PM - 9:00 AM  Fees: Free   Phone: (489) 139-2873 Email: lorie@[facility].org Website: https://centralusa.[facility].org/northern/[facility]/     11  [facility] Distance: 12.81 miles      In-Person   [address] [place] [address]  Language: English  Hours: Mon - Sun Open 24 Hours  Fees: Free   Phone:  (184) 474-4211 Email: communications@oscs-mn.org Website: https://oscs-mn.org/oursaviourshousing/          Important Numbers & Websites       Emergency Services   911  [facility] Services   311  Poison Control   (661) 918-4571  Suicide Prevention Lifeline   (343) 173-2485 (TALK)  Child Abuse Hotline   (708) 967-8135 (4-A-Child)  Sexual Assault Hotline   (184) 584-6764 (HOPE)  National Runaway Safeline   (200) 187-2385 (RUNAWAY)  All-Options Talkline   (305) 102-1175  Substance Abuse Referral   (581) 105-5845 (HELP)

## 2023-12-08 NOTE — TELEPHONE ENCOUNTER
It's two weeks tomorrow started coughing and URI. One week ago negative for covid-19. Some trouble breathing, cough is getting better. Not going away though.  I connected with scheduling for an appointment and advised urgent care if they can't get her in.  Lo Mon RN  [facility]      Reason for Disposition   MILD difficulty breathing (e.g., minimal/no SOB at rest, SOB with walking, pulse <100) and still present when not coughing    Additional Information   Negative: Bluish (or gray) lips or face   Negative: SEVERE difficulty breathing (e.g., struggling for each breath, speaks in single words)   Negative: Rapid onset of cough and has hives   Negative: Coughing started suddenly after medicine, an allergic food or bee sting   Negative: Difficulty breathing after exposure to flames, smoke, or fumes   Negative: Sounds like a life-threatening emergency to the triager   Negative: Previous asthma attacks and this feels like asthma attack   Negative: Dry cough (non-productive; no sputum or minimal clear sputum) and within 14 days of COVID-19 Exposure   Negative: MODERATE difficulty breathing (e.g., speaks in phrases, SOB even at rest, pulse 100-120) and still present when not coughing   Negative: Chest pain present when not coughing   Negative: Passed out (i.e., fainted, collapsed and was not responding)   Negative: Patient sounds very sick or weak to the triager    Protocols used: Cough-A-OH

## 2023-12-13 NOTE — TELEPHONE ENCOUNTER
Left message on patient's voicemail to call back and speak with care team to confirm she picked up both medications sent to her pharmacy     [facility]   [address] [place] [address]        SUKHJINDER BrittonN RN  [facility]

## 2023-12-13 NOTE — TELEPHONE ENCOUNTER
Writer responded via [employer].  SUKHJINDER MathewN, RN-BC  [facility]

## 2023-12-13 NOTE — TELEPHONE ENCOUNTER
RN - can you please call pt to verify if she picked up medicine for pneumonia. I sent two my chart messages on Friday but they were not read. Thanks!  PARI

## 2023-12-21 ENCOUNTER — TELEPHONE (OUTPATIENT)
Dept: FAMILY MEDICINE | Facility: CLINIC | Age: 63
End: 2023-12-21

## 2023-12-21 NOTE — TELEPHONE ENCOUNTER
Patient Quality Outreach    Patient is due for the following:   Colon Cancer Screening  Breast Cancer Screening - Mammogram  Physical Annual Wellness Visit      Topic Date Due    COVID-19 Vaccine (3 - 2023-24 season) 09/01/2023    Pneumococcal Vaccine (1 of 1 - PCV) 08/24/2023       Type of outreach:    Sent Lavaboom message.      Questions for provider review:    None           Tatianna Vital MA  Chart routed to Care Team.

## 2024-02-05 ENCOUNTER — HOSPITAL ENCOUNTER (OUTPATIENT)
Dept: MAMMOGRAPHY | Facility: CLINIC | Age: 64
Discharge: HOME | End: 2024-02-05
Attending: FAMILY MEDICINE | Admitting: FAMILY MEDICINE
Payer: MEDICARE

## 2024-02-05 DIAGNOSIS — Z12.31 VISIT FOR SCREENING MAMMOGRAM: ICD-10-CM

## 2024-02-05 PROCEDURE — 77063 BREAST TOMOSYNTHESIS BI: CPT

## 2024-03-05 SDOH — HEALTH STABILITY: PHYSICAL HEALTH: ON AVERAGE, HOW MANY DAYS PER WEEK DO YOU ENGAGE IN MODERATE TO STRENUOUS EXERCISE (LIKE A BRISK WALK)?: 4 DAYS

## 2024-03-05 SDOH — HEALTH STABILITY: PHYSICAL HEALTH: ON AVERAGE, HOW MANY MINUTES DO YOU ENGAGE IN EXERCISE AT THIS LEVEL?: 50 MIN

## 2024-03-05 ASSESSMENT — SOCIAL DETERMINANTS OF HEALTH (SDOH): HOW OFTEN DO YOU GET TOGETHER WITH FRIENDS OR RELATIVES?: ONCE A WEEK

## 2024-03-05 NOTE — COMMUNITY RESOURCES LIST (ENGLISH)
03/05/2024   [facility]  N/A  For questions about this resource list or additional care needs, please contact your primary care clinic or care manager.  Phone: [phone]   Email: N/A   Address: [address] [place] [address]   Hours: N/A        Hotlines and Helplines       Hotline - Housing crisis  1  [facility] Housing Resource Line Distance: 12.17 miles      Phone/Virtual   [address] [place] [address]  Language: English  Hours: Mon - Sun Open 24 Hours   Phone: (888) 146-9712 Website: https://www.[facility] [place]./2689/Basic-Needs     2  [facility] Distance: 12.81 miles      Phone/Virtual   [address] [place] [address]  Language: English  Hours: Mon - Sun Open 24 Hours   Phone: (490) 347-2074 Email: communications@[email or web address]-[place].org Website: https://[facility]-[place].org/oursaviourshousing/          Housing       Coordinated Entry access point  3  [facility]  Office - [facility] Distance: 4.07 miles      Phone/Virtual   [address] [place] [address]  Language: English  Hours: Mon - Fri 8:30 AM - 12:00 PM , Mon - Fri 1:00 PM - 4:00 PM  Fees: Free   Phone: (386) 517-8942 Ext.2 Email: narendra@[facility].org Website: https://www.[facility] Hospital[facility]dingusa.org/usn/     4  [facility] Distance: 12.92 miles      In-Person, Phone/Virtual   [address] [place] [address]  Language: English  Hours: Mon - Fri 8:30 AM - 4:30 PM  Fees: Free   Phone: (293) 815-5388 Email: info@[facility].org Website: https://www.[facility].org/locations/[facility]/     Drop-in center or day shelter  5  [facility] Distance: 11.86 miles      In-Person   [address] [place] [address]  Language: English, Hmong, Andorran, New Zealander  Hours: Mon - Thu 8:30 AM - 4:30 PM , Sat - Sun 9:00 AM - 12:00 PM  Fees: Free   Phone: (140) 537-3551 Email: info@[employer].org Website:  https://sharingCarteret Health Carecaringhands.org/     6  [facility] [place] [facility] [place] [facility] Distance: 12.43 miles      In-Person   [address] [place] [address]  Language: English, Citizen of [place], Mauritanian  Hours: Mon - Sat 7:00 AM - 3:00 PM  Fees: Free, Self Pay   Phone: (542) 985-4559 Email: info@[employer] Website: https://www.[employer]/locations/opportunity-center/     Housing search assistance  7  [facility] ([facility] - [facility] Rental Housing Distance: 4.06 miles      In-Person, Phone/Virtual   [address] [place] [address]  Language: English  Hours: Mon - Fri 8:00 AM - 4:30 PM  Fees: Free   Phone: (893) 110-6345 Email: accap@accap.org Website: http://www.[employer]ap.[employer]arkia     8  Neighborhood [facility] [employer] Distance: 8.45 miles      Phone/Virtual   [address] [place] [address]  Language: English, Mauritanian  Hours: Mon - Fri 9:00 AM - 5:00 PM  Fees: Free   Phone: (628) 293-4356 Email: services@[employer] Website: https://www.[employer]     Shelter for families  9  [facility] Distance: 8.18 miles      In-Person   [address] [place] [address]  Language: English  Hours: Mon - Fri 3:00 PM - 9:00 AM , Sat - Sun Open 24 Hours  Fees: Free   Phone: (342) 465-4616 Ext.1 Website: https://www.[facility].org/2020/07/03/emergency-family-shelter/     Shelter for individuals  10  [facility] Distance: 12.09 miles      In-Person   [address] [place] [address]  Language: English  Hours: Mon - Fri 4:00 PM - 9:00 AM  Fees: Free   Phone: (583) 381-6814 Email: lorie@[facility].org Website: https://centralusa.[facility].org/northern/[facility]/     11  [facility] Distance: 12.81 miles      In-Person   [address] [place] [address]  Language: English  Hours: Mon - Sun Open 24 Hours  Fees: Free   Phone:  (299) 541-2982 Email: communications@[facility]-[place].org Website: https://[facility]-[place].org/oursaviourshousing/          Important Numbers & Websites       Emergency Services   911  [facility] Services   311  Poison Control   (902) 262-2513  Suicide Prevention Lifeline   (312) 799-8440 (TALK)  Child Abuse Hotline   (632) 217-9174 (4-A-Child)  Sexual Assault Hotline   (532) 979-5529 (HOPE)  National Runaway Safeline   (398) 990-7970 (RUNAWAY)  All-Options Talkline   (695) 931-2333  Substance Abuse Referral   (995) 484-1733 (HELP)

## 2024-03-08 ENCOUNTER — OFFICE VISIT (OUTPATIENT)
Dept: FAMILY MEDICINE | Facility: CLINIC | Age: 64
End: 2024-03-08
Payer: MEDICARE

## 2024-03-08 VITALS
RESPIRATION RATE: 18 BRPM | WEIGHT: 134.8 LBS | OXYGEN SATURATION: 99 % | SYSTOLIC BLOOD PRESSURE: 112 MMHG | DIASTOLIC BLOOD PRESSURE: 70 MMHG | TEMPERATURE: 97.8 F | HEIGHT: 64 IN | HEART RATE: 70 BPM | BODY MASS INDEX: 23.01 KG/M2

## 2024-03-08 DIAGNOSIS — G47.00 INSOMNIA, UNSPECIFIED TYPE: ICD-10-CM

## 2024-03-08 DIAGNOSIS — Z12.11 SCREEN FOR COLON CANCER: ICD-10-CM

## 2024-03-08 DIAGNOSIS — R73.09 ABNORMAL GLUCOSE: ICD-10-CM

## 2024-03-08 DIAGNOSIS — Z87.19 HISTORY OF ESOPHAGEAL STRICTURE: ICD-10-CM

## 2024-03-08 DIAGNOSIS — Z82.3 FAMILY HISTORY OF STROKE: ICD-10-CM

## 2024-03-08 DIAGNOSIS — Z82.49 FAMILY HISTORY OF CAROTID ARTERY STENOSIS: ICD-10-CM

## 2024-03-08 DIAGNOSIS — R00.2 PALPITATIONS: ICD-10-CM

## 2024-03-08 DIAGNOSIS — Z00.00 WELCOME TO MEDICARE PREVENTIVE VISIT: Primary | ICD-10-CM

## 2024-03-08 DIAGNOSIS — S15.092D: ICD-10-CM

## 2024-03-08 DIAGNOSIS — Z13.6 CARDIOVASCULAR SCREENING; LDL GOAL LESS THAN 160: ICD-10-CM

## 2024-03-08 LAB
ANION GAP SERPL CALCULATED.3IONS-SCNC: 10 MMOL/L (ref 7–15)
BUN SERPL-MCNC: 12.9 MG/DL (ref 8–23)
CALCIUM SERPL-MCNC: 9.5 MG/DL (ref 8.8–10.2)
CHLORIDE SERPL-SCNC: 102 MMOL/L (ref 98–107)
CHOLEST SERPL-MCNC: 231 MG/DL
CREAT SERPL-MCNC: 0.77 MG/DL (ref 0.51–0.95)
DEPRECATED HCO3 PLAS-SCNC: 28 MMOL/L (ref 22–29)
EGFRCR SERPLBLD CKD-EPI 2021: 85 ML/MIN/1.73M2
ERYTHROCYTE [DISTWIDTH] IN BLOOD BY AUTOMATED COUNT: 13 % (ref 10–15)
FASTING STATUS PATIENT QL REPORTED: YES
GLUCOSE SERPL-MCNC: 98 MG/DL (ref 70–99)
HBA1C MFR BLD: 4.9 % (ref 0–5.6)
HCT VFR BLD AUTO: 42.1 % (ref 35–47)
HDLC SERPL-MCNC: 92 MG/DL
HGB BLD-MCNC: 13.4 G/DL (ref 11.7–15.7)
LDLC SERPL CALC-MCNC: 126 MG/DL
MCH RBC QN AUTO: 30.5 PG (ref 26.5–33)
MCHC RBC AUTO-ENTMCNC: 31.8 G/DL (ref 31.5–36.5)
MCV RBC AUTO: 96 FL (ref 78–100)
NONHDLC SERPL-MCNC: 139 MG/DL
PLATELET # BLD AUTO: 233 10E3/UL (ref 150–450)
POTASSIUM SERPL-SCNC: 4.2 MMOL/L (ref 3.4–5.3)
RBC # BLD AUTO: 4.39 10E6/UL (ref 3.8–5.2)
SODIUM SERPL-SCNC: 140 MMOL/L (ref 135–145)
TRIGL SERPL-MCNC: 63 MG/DL
TSH SERPL DL<=0.005 MIU/L-ACNC: 1.19 UIU/ML (ref 0.3–4.2)
WBC # BLD AUTO: 6 10E3/UL (ref 4–11)

## 2024-03-08 PROCEDURE — 80048 BASIC METABOLIC PNL TOTAL CA: CPT | Performed by: FAMILY MEDICINE

## 2024-03-08 PROCEDURE — 84443 ASSAY THYROID STIM HORMONE: CPT | Performed by: FAMILY MEDICINE

## 2024-03-08 PROCEDURE — 36415 COLL VENOUS BLD VENIPUNCTURE: CPT | Performed by: FAMILY MEDICINE

## 2024-03-08 PROCEDURE — 99213 OFFICE O/P EST LOW 20 MIN: CPT | Mod: 25 | Performed by: FAMILY MEDICINE

## 2024-03-08 PROCEDURE — G0402 INITIAL PREVENTIVE EXAM: HCPCS | Performed by: FAMILY MEDICINE

## 2024-03-08 PROCEDURE — 80061 LIPID PANEL: CPT | Performed by: FAMILY MEDICINE

## 2024-03-08 PROCEDURE — 83036 HEMOGLOBIN GLYCOSYLATED A1C: CPT | Performed by: FAMILY MEDICINE

## 2024-03-08 PROCEDURE — 85027 COMPLETE CBC AUTOMATED: CPT | Performed by: FAMILY MEDICINE

## 2024-03-08 RX ORDER — RESPIRATORY SYNCYTIAL VIRUS VACCINE 120MCG/0.5
0.5 KIT INTRAMUSCULAR ONCE
Qty: 1 EACH | Refills: 0 | Status: CANCELLED | OUTPATIENT
Start: 2024-03-08 | End: 2024-03-08

## 2024-03-08 NOTE — PROGRESS NOTES
Preventive Care Visit  [facility] PATSY Briggs MD, Family Medicine  Mar 8, 2024    Assessment & Plan     (Z00.00) Welcome to Medicare preventive visit  (primary encounter diagnosis)  Comment:  Reviewed the need for periodic healthcare exams and screenings.   Plan: REVIEW OF HEALTH MAINTENANCE PROTOCOL ORDERS        Advised yearly physicals.     (R00.2) PALPITATIONS - rare  Comment: rare, no change in pattern. No signs of anemia, thyroid disorder, electrolyte abnormality.   Plan: TSH with free T4 reflex, CBC with platelets        Monitor. No specific treatment needed at this time.     (S15.092D) Other specified injury of left carotid artery, subsequent encounter  Comment: Somewhat unusual presentation, patient has history of trauma to the left side of her neck years ago.  She now experiences a pulsatile sensation along the left side of her neck when bending forward.  Plan: US Carotid Bilateral        Will check for carotid artery stenosis, if negative, advised to symptoms seem benign and to monitor.    (G47.00) Insomnia, unspecified type  Comment: persistent. Will refer to our sleep center. Doubt underlying depression.   Plan: Adult Sleep Eval & Management          Referral            (Z82.3) Family history of stroke  Comment: See below.      (Z82.49) Family history of carotid artery stenosis  Comment: Mother with a history of stroke secondary to atherosclerotic vessel disease of the carotid arteries.  Plan: Reviewed the need to manage blood pressure.    (R73.09) Abnormal glucose  Comment: no signs of insulin resistance.   Lab Results   Component Value Date    A1C 4.9 03/08/2024    A1C 4.8 10/06/2021      Plan: Basic metabolic panel  (Ca, Cl, CO2, Creat,         Gluc, K, Na, BUN), Hemoglobin A1c            (Z87.19) History of esophageal stricture  Comment: Patient notes 2 episodes of food being stuck in her esophagus.  Possible stricture, will refer for EGD.  Plan: Adult GI   Referral - Procedure Only        Await endoscopy results.      (Z13.6) CARDIOVASCULAR SCREENING; LDL GOAL LESS THAN 160  Comment: Elevated but acceptable panel, very good HDL at 92.  No documented secondary complications.  Plan: Lipid panel reflex to direct LDL Fasting        For now, no strong indication for statin therapy.  Carotid Doppler results pending.    (Z12.11) Screen for colon cancer  Plan: Colonoscopy Screening  Referral          Results for orders placed or performed in visit on 03/08/24   Lipid panel reflex to direct LDL Fasting     Status: Abnormal   Result Value Ref Range    Cholesterol 231 (H) <200 mg/dL    Triglycerides 63 <150 mg/dL    Direct Measure HDL 92 >=50 mg/dL    LDL Cholesterol Calculated 126 (H) <=100 mg/dL    Non HDL Cholesterol 139 (H) <130 mg/dL    Patient Fasting > 8hrs? Yes     Narrative    Cholesterol  Desirable:  <200 mg/dL    Triglycerides  Normal:  Less than 150 mg/dL  Borderline High:  150-199 mg/dL  High:  200-499 mg/dL  Very High:  Greater than or equal to 500 mg/dL    Direct Measure HDL  Female:  Greater than or equal to 50 mg/dL   Male:  Greater than or equal to 40 mg/dL    LDL Cholesterol  Desirable:  <100mg/dL  Above Desirable:  100-129 mg/dL   Borderline High:  130-159 mg/dL   High:  160-189 mg/dL   Very High:  >= 190 mg/dL    Non HDL Cholesterol  Desirable:  130 mg/dL  Above Desirable:  130-159 mg/dL  Borderline High:  160-189 mg/dL  High:  190-219 mg/dL  Very High:  Greater than or equal to 220 mg/dL   Basic metabolic panel  (Ca, Cl, CO2, Creat, Gluc, K, Na, BUN)     Status: Normal   Result Value Ref Range    Sodium 140 135 - 145 mmol/L    Potassium 4.2 3.4 - 5.3 mmol/L    Chloride 102 98 - 107 mmol/L    Carbon Dioxide (CO2) 28 22 - 29 mmol/L    Anion Gap 10 7 - 15 mmol/L    Urea Nitrogen 12.9 8.0 - 23.0 mg/dL    Creatinine 0.77 0.51 - 0.95 mg/dL    GFR Estimate 85 >60 mL/min/1.73m2    Calcium 9.5 8.8 - 10.2 mg/dL    Glucose 98 70 - 99 mg/dL    Hemoglobin A1c     Status: Normal   Result Value Ref Range    Hemoglobin A1C 4.9 0.0 - 5.6 %   TSH with free T4 reflex     Status: Normal   Result Value Ref Range    TSH 1.19 0.30 - 4.20 uIU/mL   CBC with platelets     Status: Normal   Result Value Ref Range    WBC Count 6.0 4.0 - 11.0 10e3/uL    RBC Count 4.39 3.80 - 5.20 10e6/uL    Hemoglobin 13.4 11.7 - 15.7 g/dL    Hematocrit 42.1 35.0 - 47.0 %    MCV 96 78 - 100 fL    MCH 30.5 26.5 - 33.0 pg    MCHC 31.8 31.5 - 36.5 g/dL    RDW 13.0 10.0 - 15.0 %    Platelet Count 233 150 - 450 10e3/uL        Patient Instructions   Stop by pharmacy for shots. Pneumonia, RSV.     Keep up the exercise.     Blood tests today.     See our sleep clinic regarding insomnia. [employer] will call you to coordinate your care as prescribed by your provider. If you don't hear from a representative within 2 business days, please call [phone].     For colonoscopy and a EGD, scope of your stomach. They should be able to coordinate them at the same time. [employer] will call you to coordinate your care as prescribed by the provider. If you don t hear from a representative within 2 business days, please call [phone].     Call about setting up an ultrasound of your carotid ultrasound. [phone].         Patient has been advised of split billing requirements and indicates understanding: Yes      Counseling  Appropriate preventive services were discussed with this patient, including applicable screening as appropriate for fall prevention, nutrition, physical activity, Tobacco-use cessation, weight loss and cognition.  Checklist reviewing preventive services available has been given to the patient.  Reviewed patient's diet, addressing concerns and/or questions.   She is at risk for psychosocial distress and has been provided with information to reduce risk.   The patient was provided with written information regarding signs of hearing loss.         Ling Solis  is a 63 year old, presenting for the following:  Physical (fasting)        3/8/2024     7:16 AM   Additional Questions   Roomed by Lyndsay   Accompanied by mya         3/8/2024     7:16 AM   Patient Reported Additional Medications   Patient reports taking the following new medications none        Health Care Directive  Patient does not have a Health Care Directive or Living Will: Discussed advance care planning with patient; information given to patient to review.    HPI    Also discussed the following:      Palpitations  Other specified injury of left carotid artery, subsequent encounter  Insomnia, unspecified type  Family history of stroke  Family history of carotid artery stenosis  Abnormal glucose  History of esophageal stricture  Acute nonintractable headache, unspecified headache type  CARDIOVASCULAR SCREENING; LDL GOAL LESS THAN 160  Screen for colon cancer           3/5/2024   General Health   How would you rate your overall physical health? Excellent   Feel stress (tense, anxious, or unable to sleep) Only a little   (!) STRESS CONCERN      3/5/2024   Nutrition   Diet: Regular (no restrictions)         3/5/2024   Exercise   Days per week of moderate/strenous exercise 4 days   Average minutes spent exercising at this level 50 min         3/5/2024   Social Factors   Frequency of gathering with friends or relatives Once a week   Worry food won't last until get money to buy more No   Food not last or not have enough money for food? No   Do you have housing?  No   Are you worried about losing your housing? No   Lack of transportation? No   Unable to get utilities (heat,electricity)? No   Want help with housing or utility concern? No   (!) HOUSING CONCERN PRESENT      3/5/2024   Fall Risk   Fallen 2 or more times in the past year? No   Trouble with walking or balance? No          3/5/2024   Activities of Daily Living- Home Safety   Needs help with the following daily activites None of the above   Safety concerns in  the home None of the above         3/5/2024   Dental   Dentist two times every year? Yes         3/5/2024   Hearing Screening   Hearing concerns? (!) IT'S HARD TO FOLLOW A CONVERSATION IN A NOISY RESTAURANT OR CROWDED ROOM.         3/5/2024   Driving Risk Screening   Patient/family members have concerns about driving No         3/5/2024   General Alertness/Fatigue Screening   Have you been more tired than usual lately? No         3/5/2024   Urinary Incontinence Screening   Bothered by leaking urine in past 6 months No         3/5/2024   TB Screening   Were you born outside of [place]?  No         Today's PHQ-2 Score:       3/8/2024     7:06 AM   PHQ-2 ( 1999 Pfizer)   Q1: Little interest or pleasure in doing things 0   Q2: Feeling down, depressed or hopeless 0   PHQ-2 Score 0   Q1: Little interest or pleasure in doing things Not at all   Q2: Feeling down, depressed or hopeless Not at all   PHQ-2 Score 0           3/5/2024   Substance Use   Alcohol more than 3/day or more than 7/wk No   Do you have a current opioid prescription? No   How severe/bad is pain from 1 to 10? 1/10   Do you use any other substances recreationally? No     Social History     Tobacco Use    Smoking status: Never     Passive exposure: Never    Smokeless tobacco: Never   Vaping Use    Vaping Use: Never used   Substance Use Topics    Alcohol use: Yes     Alcohol/week: 1.7 standard drinks of alcohol     Comment: 2-3 times a month    Drug use: No             3/5/2024   Breast Cancer Screening   Family history of breast, colon, or ovarian cancer? No / Unknown         2/5/2024   LAST FHS-7 RESULTS   1st degree relative breast or ovarian cancer No   Any relative bilateral breast cancer No   Any male have breast cancer No   Any ONE woman have BOTH breast AND ovarian cancer No   Any woman with breast cancer before 50yrs No   2 or more relatives with breast AND/OR ovarian cancer No   2 or more relatives with breast AND/OR bowel cancer No        Mammogram  Screening - Mammogram every 1-2 years updated in Health Maintenance based on mutual decision making          3/5/2024   One time HIV Screening   Previous HIV test? No     History of abnormal Pap smear: NO - age 63 - see link Cervical Cytology Screening Guidelines        Latest Ref Rng & Units 5/13/2019     2:31 PM 5/13/2019     2:05 PM 2/12/2015    12:00 AM   PAP / HPV   PAP (Historical)   NIL  NIL    HPV 16 DNA NEG^Negative Negative      HPV 18 DNA NEG^Negative Negative      Other HR HPV NEG^Negative Negative        ASCVD Risk   The 10-year ASCVD risk score (Karol CHAVARRIA, et al., 2019) is: 3.6%    Values used to calculate the score:      Age: 63 years      Sex: Female      Is Non- : No      Diabetic: No      Tobacco smoker: No      Systolic Blood Pressure: 112 mmHg      Is BP treated: No      HDL Cholesterol: 98 mg/dL      Total Cholesterol: 231 mg/dL            Reviewed and updated as needed this visit by Provider                    Labs reviewed in EPIC  Current providers sharing in care for this patient include:  Patient Care Team:  Mayelin Briggs MD as PCP - General (Family Practice)  Ray Og MD as Assigned Surgical Provider  Jolanta Morgan PA-C as Physician Assistant (Dermatology)  Homer Duffy MD as Assigned PCP    The following health maintenance items are reviewed in Epic and correct as of today:  Health Maintenance   Topic Date Due    HIV SCREENING  Never done    RSV VACCINE (Pregnancy & 60+) (1 - 1-dose 60+ series) Never done    COLORECTAL CANCER SCREENING  09/14/2019    ANNUAL REVIEW OF HM ORDERS  10/06/2022    MEDICARE ANNUAL WELLNESS VISIT  08/24/2023    Pneumococcal Vaccine: 65+ Years (1 of 1 - PCV) Never done    COVID-19 Vaccine (3 - 2023-24 season) 09/01/2023    GLUCOSE  10/06/2024    DTAP/TDAP/TD IMMUNIZATION (2 - Td or Tdap) 02/12/2025    FALL RISK ASSESSMENT  03/08/2025    MAMMO SCREENING  02/05/2026    LIPID  10/06/2026    ADVANCE  "CARE PLANNING  10/07/2026    DEXA  04/24/2032    HEPATITIS C SCREENING  Completed    PHQ-2 (once per calendar year)  Completed    INFLUENZA VACCINE  Completed    ZOSTER IMMUNIZATION  Completed    IPV IMMUNIZATION  Aged Out    HPV IMMUNIZATION  Aged Out    MENINGITIS IMMUNIZATION  Aged Out    RSV MONOCLONAL ANTIBODY  Aged Out    PAP  Discontinued         Review of Systems  CONSTITUTIONAL: NEGATIVE for fever, chills, change in weight  ENT/MOUTH: NEGATIVE for ear, mouth and throat problems  RESP: NEGATIVE for significant cough or SOB  CV: NEGATIVE for chest pain, palpitations or peripheral edema  :   NEURO:   PSYCHIATRIC:      Objective    Exam  /70   Pulse 70   Temp 97.8  F (36.6  C) (Temporal)   Resp 18   Ht 1.63 m (5' 4.17\")   Wt 61.1 kg (134 lb 12.8 oz)   LMP 11/01/2007 (LMP Unknown)   SpO2 99%   BMI 23.01 kg/m     Estimated body mass index is 23.01 kg/m  as calculated from the following:    Height as of this encounter: 1.63 m (5' 4.17\").    Weight as of this encounter: 61.1 kg (134 lb 12.8 oz).    Physical Exam  GENERAL: Healthy, alert and no distress  EYES: Eyes grossly normal to inspection, conjunctivae and sclerae normal  RESP: Lungs clear to auscultation - no rales, rhonchi or wheezes  CV: Regular rate and rhythm, normal S1 S2, no murmur.  No carotid bruits heard.  MS: No gross musculoskeletal defects noted, no edema  NEURO: Normal strength and tone, mentation intact and speech normal  PSYCH: Mentation appears normal, affect normal/bright         3/8/2024   Mini Cog   Clock Draw Score 2 Normal   3 Item Recall 3 objects recalled   Mini Cog Total Score 5         Vision Screen  Patient wears corrective lenses (select all that apply): Worn during vision screen  Vision Screen Results: Pass    Signed Electronically by: Mayelin Briggs MD    "

## 2024-03-08 NOTE — PATIENT INSTRUCTIONS
Stop by pharmacy for shots. Pneumonia, RSV.     Keep up the exercise.     Blood tests today.     See our sleep clinic regarding insomnia.  [employer] will call you to coordinate your care as prescribed by your provider. If you don't hear from a representative within 2 business days, please call [phone].     For colonoscopy and a EGD, scope of your stomach. They should be able to coordinate them at the same time.  [employer] will call you to coordinate your care as prescribed by the provider. If you don t hear from a representative within 2 business days, please call [phone].     Call about setting up an ultrasound of your carotid ultrasound. [phone].

## 2024-03-14 ENCOUNTER — OFFICE VISIT (OUTPATIENT)
Dept: SLEEP MEDICINE | Facility: CLINIC | Age: 64
End: 2024-03-14
Payer: MEDICARE

## 2024-03-14 VITALS
SYSTOLIC BLOOD PRESSURE: 126 MMHG | WEIGHT: 137.5 LBS | HEIGHT: 64 IN | OXYGEN SATURATION: 100 % | BODY MASS INDEX: 23.47 KG/M2 | HEART RATE: 65 BPM | DIASTOLIC BLOOD PRESSURE: 80 MMHG

## 2024-03-14 DIAGNOSIS — R53.82 CHRONIC FATIGUE: ICD-10-CM

## 2024-03-14 DIAGNOSIS — G47.00 PERSISTENT INSOMNIA: ICD-10-CM

## 2024-03-14 DIAGNOSIS — Z86.69 HISTORY OF OBSTRUCTIVE SLEEP APNEA: Primary | ICD-10-CM

## 2024-03-14 DIAGNOSIS — R06.83 SNORING: ICD-10-CM

## 2024-03-14 PROCEDURE — 99204 OFFICE O/P NEW MOD 45 MIN: CPT | Performed by: INTERNAL MEDICINE

## 2024-03-14 ASSESSMENT — SLEEP AND FATIGUE QUESTIONNAIRES
HOW LIKELY ARE YOU TO NOD OFF OR FALL ASLEEP WHILE WATCHING TV: SLIGHT CHANCE OF DOZING
HOW LIKELY ARE YOU TO NOD OFF OR FALL ASLEEP WHILE SITTING AND TALKING TO SOMEONE: WOULD NEVER DOZE
HOW LIKELY ARE YOU TO NOD OFF OR FALL ASLEEP WHILE SITTING INACTIVE IN A PUBLIC PLACE: WOULD NEVER DOZE
HOW LIKELY ARE YOU TO NOD OFF OR FALL ASLEEP WHILE LYING DOWN TO REST IN THE AFTERNOON WHEN CIRCUMSTANCES PERMIT: SLIGHT CHANCE OF DOZING
HOW LIKELY ARE YOU TO NOD OFF OR FALL ASLEEP WHILE SITTING QUIETLY AFTER LUNCH WITHOUT ALCOHOL: SLIGHT CHANCE OF DOZING
HOW LIKELY ARE YOU TO NOD OFF OR FALL ASLEEP IN A CAR, WHILE STOPPED FOR A FEW MINUTES IN TRAFFIC: WOULD NEVER DOZE
HOW LIKELY ARE YOU TO NOD OFF OR FALL ASLEEP WHEN YOU ARE A PASSENGER IN A CAR FOR AN HOUR WITHOUT A BREAK: SLIGHT CHANCE OF DOZING
HOW LIKELY ARE YOU TO NOD OFF OR FALL ASLEEP WHILE SITTING AND READING: SLIGHT CHANCE OF DOZING

## 2024-03-14 NOTE — NURSING NOTE
Sleep study and return visit has been schedule. AVS and PSG instruction handouts given to patient.     Daniela Ivey MA

## 2024-03-14 NOTE — PATIENT INSTRUCTIONS
Patient education: What is a sleep study?     What is a sleep study? -- A sleep study is a test that measures how well you sleep and checks for sleep problems. For some sleep studies, you stay overnight in a sleep lab at a hospital or sleep center.     What happens during a sleep study? -- Before you go to sleep, a technician attaches small, sticky patches called  electrodes  to your head, chest, and legs. He or she will also place a small tube beneath your nose and might wrap 1 or 2 belts around your chest.   Each of these items has wires that connect to monitors. The monitors record your movement, brain activity, breathing, and other body functions while you sleep.  If you have a history of trouble falling asleep, your doctor might prescribe a medicine to help you fall asleep in the lab. If you have never taken the medicine before, your doctor might ask you take it on a night before your sleep study to see how it affects you.   Why might my doctor order a sleep study? -- Your doctor will order a sleep study if he or she thinks you have sleep apnea or a different condition that makes you:   ?Have sudden jerking leg movements while you sleep, called  periodic limb movements.    ?Feel very sleepy during the day and fall asleep all of a sudden, called  narcolepsy.    ?Have trouble falling asleep or staying asleep over a long period of time, called  chronic insomnia.    ?Do odd things while you sleep, such as walking.  How should I prepare for a sleep study? -- On the day of your sleep study, you should:   ?Avoid alcohol   ?Avoid drinking coffee, tea, sodas, and other drinks that have caffeine in the afternoon and evening   ?Take all of your regular medicines     The cost of care estimate line is [phone]. They are able to give the patient an estimate of the charges and also an estimate of their insurance coverage/patient responsibility.   After your sleep study is performed, please call us at [phone] or  [phone]  to schedule for a follow up to review the results of the sleep study.    Please bring one tab of low dose melatonin 3 mg or less to the night of the study.    Melatonin intake is completely voluntary.    You may take own melatonin after arrival to sleep center. Do not drive or operate machinery after intake of melatonin.     Please make every effort you can to sleep on your back with one pillow behind your head.    Please also call your insurance company next week to see if your sleep study is approved and find out your co-pay.

## 2024-03-14 NOTE — H&P (VIEW-ONLY)
Additional 15 minutes on the date of service was spent performing the following:    -Preparing to see the patient  -Obtaining and/or reviewing separately obtained history   -Ordering medications, tests, or procedures   -Documenting clinical information in the electronic or other health record     Assessment and Plan:    In summary Carlos Mayo is a 63 year old year old female here for sleep disturbance.  1.  History of MARY/Chronic Fatigue/Snoring/Persistent Insomnia   Carlos Mayo has high risk for obstructive sleep apnea based on the history of sleep apnea, chronic fatigue, snoring and a crowded airway. I educated the patient on the underlying pathophysiology of obstructive sleep apnea. We reviewed the risks associated with sleep apnea, including increased cardiovascular risk and overall death. We talked about treatments briefly. I recommend getting a full night-night nocturnal polysomnography. The patient should return to the clinic to discuss results and treatment option in a patient-centered approach.    History of present illness:    She is a 63 year old female who comes to the clinic with a chief complaint of sleep maintenance insomnia has been going for more than 10 years.  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  As a result he still suffers from chronic fatigue and snoring during sleep.  She also complains of difficulty staying asleep.     Ideal Sleep-Wake Cycle(devoid of societal pressure):      TIME IN BED:    1) Work/School Days:    Do you work or go to school? No   What time do you usually get into bed? 9:45PM   About how long does it take you to fall asleep? a few minutes   How often do you have trouble falling asleep? only once in a while   How often do you wake up during the night? at least 3-4 times   Do you work days/evenings/nights/rotating shifts?    What wakes you up at night?    How often do you have trouble falling back to sleep? it varies,  maybe one night every 10 days   About how long does it take to fall back to sleep? sometimes right away, sometimes an hour or more   What do you usually do if you have trouble getting back to sleep? keep trying to fall asleep, sometimes i'll try reading   What time do you usually get out of bed to start your day? 6:00 am   Do you use an alarm? No   2) Weekends/Non-work Days/All Other Days    What time do you usually get into bed? 9:45PM   About how long does it take you to fall asleep? a couple of minutes   What time do you usually get out of bed to start your day? 6:00AM   Do you use an alarm? No   SLEEP NEED    On average, about how much sleep do you think you get? 4-6 hours total   About how much sleep do you think you need? 8 hrs   SLEEP POSITION    Which sleep positions do you prefer? Side   Do you do any of the following activities in bed? Read   How often do you take a nap on purpose? 0   About how long are your naps?    Do you feel better after naps?    How often do you doze off unintentionally? one   Have you ever had a driving accident or near-miss due to sleepiness/drowsiness? No     Stop Bang Questionnaire    Question 3/14/2024 11:47 AM CDT - Filed by Patient   Do you SNORE loudly (louder than talking or loud enough to be heard through closed doors)? No   Do you often feel TIRED, fatigued, or sleepy during daytime? Yes   Has anyone OBSERVED you stop breathing during your sleep? No   Do you have or are you being treated for high blood PRESSURE? No   BMI more than 35kg/m2? No   AGE over 50 years old? Yes   NECK circumference > 16 inches (40cm)? No   GENDER: Male? No   STOP-BANG Total Score (range: 0 - 8) 3 (High risk of MARY) Critical      Stop Bang Questionnaire    Question 3/14/2024 11:47 AM CDT - Filed by Patient   Do you SNORE loudly (louder than talking or loud enough to be heard through closed doors)? No   Do you often feel TIRED, fatigued, or sleepy during daytime? Yes   Has anyone OBSERVED you stop  breathing during your sleep? No   Do you have or are you being treated for high blood PRESSURE? No   BMI more than 35kg/m2? No   AGE over 50 years old? Yes   NECK circumference > 16 inches (40cm)? No   GENDER: Male? No   STOP-BANG Total Score (range: 0 - 8) 3 (High risk of MARY) Critical        KISHORE:  KISHORE Total Score: 12  Total score - [place]: 5 (3/14/2024 11:46 AM)    Patient told to return in one week after the sleep study is interpreted.    Patient Active Problem List   Diagnosis    PALPITATIONS - rare    Cervical stenosis (uterine cervix)    CARDIOVASCULAR SCREENING; LDL GOAL LESS THAN 160    24 hr info given    Advanced directives, counseling/discussion       Past Medical History  Past Medical History:   Diagnosis Date    Basal cell carcinoma     Esophageal reflux     Resolved    Palpitations     Holter monitor cleared per patient.     Unspecified acute reaction to stress         Past Surgical History  Past Surgical History:   Procedure Laterality Date    COLONOSCOPY  09/14/2009    Procedure: Colonoscopy Providers: Grey Paulino MD, Isis Dorantes RN    OPEN REDUCTION INTERNAL FIXATION WRIST Right 9/27/2016    Procedure: OPEN REDUCTION INTERNAL FIXATION WRIST;  Surgeon: Sammie Peterson MD;  Location: [facility] OR    REMOVE HARDWARE WRIST Right 9/14/2017    Procedure: REMOVE HARDWARE WRIST;  Right distal radius removal of hardware;  Surgeon: Sammie Peterson MD;  Location:  OR    SURGICAL HISTORY OF -   1980    Extraction of [place] teeth        Meds  Current Outpatient Medications   Medication Sig Dispense Refill    Ascorbic Acid (VITAMIN C PO) Take 500 mg by mouth daily      Cholecalciferol (VITAMIN D) 2000 UNITS CAPS Take 1 chew tab by mouth daily      vitamin B complex with vitamin C (STRESS TAB) tablet Take 1 tablet by mouth daily      albuterol (PROAIR HFA/PROVENTIL HFA/VENTOLIN HFA) 108 (90 Base) MCG/ACT inhaler Albuterol inhaler 2 puffs to be used 5 to 10 minutes before exercise or every 4-6 hours  as needed for wheezing or shortness of breath (Patient not taking: Reported on 3/14/2024) 18 g 0    fluocinonide (LIDEX) 0.05 % external cream Apply topically 2 times daily (Patient not taking: Reported on 12/8/2023) 120 g 6        Allergies  Patient has no known allergies.     Social History  Social History     Socioeconomic History    Marital status:      Spouse name: Not on file    Number of children: Not on file    Years of education: Not on file    Highest education level: Not on file   Occupational History    Not on file   Tobacco Use    Smoking status: Never     Passive exposure: Never    Smokeless tobacco: Never   Vaping Use    Vaping Use: Never used   Substance and Sexual Activity    Alcohol use: Yes     Alcohol/week: 1.7 standard drinks of alcohol     Comment: 2-3 times a month    Drug use: No    Sexual activity: Not Currently     Partners: Male     Birth control/protection: None     Comment: spouse vas   Other Topics Concern    Parent/sibling w/ CABG, MI or angioplasty before 65F 55M? No   Social History Narrative    Not on file     Social Determinants of Health     Financial Resource Strain: Low Risk  (3/5/2024)    Financial Resource Strain     Within the past 12 months, have you or your family members you live with been unable to get utilities (heat, electricity) when it was really needed?: No   Food Insecurity: Low Risk  (3/5/2024)    Food Insecurity     Within the past 12 months, did you worry that your food would run out before you got money to buy more?: No     Within the past 12 months, did the food you bought just not last and you didn t have money to get more?: No   Transportation Needs: Low Risk  (3/5/2024)    Transportation Needs     Within the past 12 months, has lack of transportation kept you from medical appointments, getting your medicines, non-medical meetings or appointments, work, or from getting things that you need?: No   Physical Activity: Sufficiently Active (3/5/2024)     Exercise Vital Sign     Days of Exercise per Week: 4 days     Minutes of Exercise per Session: 50 min   Stress: No Stress Concern Present (3/5/2024)    [employer] [place] [employer] - Occupational Stress Questionnaire     Feeling of Stress : Only a little   Social Connections: Unknown (3/5/2024)    Social Connection and Isolation Panel [NHANES]     Frequency of Communication with Friends and Family: Not on file     Frequency of Social Gatherings with Friends and Family: Once a week     Attends Alevism Services: Not on file     Active Member of Clubs or Organizations: Not on file     Attends Club or Organization Meetings: Not on file     Marital Status: Not on file   Interpersonal Safety: Low Risk  (3/8/2024)    Interpersonal Safety     Do you feel physically and emotionally safe where you currently live?: Yes     Within the past 12 months, have you been hit, slapped, kicked or otherwise physically hurt by someone?: No     Within the past 12 months, have you been humiliated or emotionally abused in other ways by your partner or ex-partner?: No   Housing Stability: High Risk (3/5/2024)    Housing Stability     Do you have housing? : No     Are you worried about losing your housing?: No        Family History  Family History   Problem Relation Age of Onset    Hypertension Mother     Depression/Anxiety Mother     Hypertension Father     Snoring Father     Cerebrovascular Disease Maternal Grandfather     Anesthesia Reaction No family hx of      Review of Systems:  Constitutional: Negative except as noted in HPI.   Eyes: Negative except as noted in HPI.   ENT: Negative except as noted in HPI.   Cardiovascular: Negative except as noted in HPI.   Respiratory: Negative except as noted in HPI.   Gastrointestinal: Negative except as noted in HPI.   Genitourinary: Negative except as noted in HPI.   Musculoskeletal: Negative except as noted in HPI.   Integumentary: Negative except as noted in HPI.   Neurological:  "Negative except as noted in HPI.   Psychiatric: Negative except as noted in HPI.   Endocrine: Negative except as noted in HPI.   Hematologic/Lymphatic: Negative except as noted in HPI.      Physical Exam:  /80   Pulse 65   Ht 1.626 m (5' 4\")   Wt 62.4 kg (137 lb 8 oz)   LMP 11/01/2007 (LMP Unknown)   SpO2 100%   BMI 23.60 kg/m    BMI:Body mass index is 23.6 kg/m .   GEN: NAD, appropriate for age  Head: Normocephalic.  EYES: PERRLA, EOMI  ENT: Oropharynx is clear, Blandon class 3+ airway. Uvula is intact  Nasal mucosa is moist without erythema  Neck : Thyroid is within normal limits.   CV: Regular rate and rhythm, S1 & S2 positive.  LUNGS: Bilateral breathsounds heard.   ABDOMEN: Positive bowel sounds in all quadrants, soft, no rebound or guarding  MUSCULOSKELETAL: No leg swelling  SKIN: warm, dry, no rashes  Neurological: Alert, Gait is normal. Strength 5/5 in all extremities.  Psych: normal mood, normal affect     Labs/Studies:     No results found for: \"PH\", \"PHARTERIAL\", \"PO2\", \"[id number]\", \"SAT\", \"PCO2\", \"HCO3\", \"BASEEXCESS\", \"MIREILLE\", \"BEB\"  Lab Results   Component Value Date    TSH 1.19 03/08/2024    TSH 0.97 10/12/2016     Lab Results   Component Value Date    GLC 98 03/08/2024     (H) 10/06/2021     Lab Results   Component Value Date    HGB 13.4 03/08/2024    HGB 13.2 10/06/2021     Lab Results   Component Value Date    BUN 12.9 03/08/2024    BUN 14 10/06/2021    CR 0.77 03/08/2024    CR 0.82 10/06/2021     Lab Results   Component Value Date    AST 42 04/12/2007    ALT 29 04/12/2007    ALKPHOS 54 04/12/2007    BILITOTAL 0.3 04/12/2007    BILICONJ 0.0 04/12/2007     No results found for: \"UAMP\", \"UBARB\", \"BENZODIAZEUR\", \"UCANN\", \"UCOC\", \"OPIT\", \"UPCP\"      Patient verbalized understanding of these issues, agrees with the plan and all questions were answered today. Patient was given an opportuntity to voice any other symptoms or concerns not listed above. Patient did not have any other " symptoms or concerns.         Abhijit Horvath DO,   Board Certified in Internal Medicine and Sleep Medicine    (Note created with Dragon voice recognition and unintended spelling errors and word substitutions may occur)

## 2024-03-14 NOTE — PROGRESS NOTES
Additional 15 minutes on the date of service was spent performing the following:    -Preparing to see the patient  -Obtaining and/or reviewing separately obtained history   -Ordering medications, tests, or procedures   -Documenting clinical information in the electronic or other health record     Assessment and Plan:    In summary Carlos Mayo is a 63 year old year old female here for sleep disturbance.  1.  History of MARY/Chronic Fatigue/Snoring/Persistent Insomnia   Carlos Mayo has high risk for obstructive sleep apnea based on the history of sleep apnea, chronic fatigue, snoring and a crowded airway. I educated the patient on the underlying pathophysiology of obstructive sleep apnea. We reviewed the risks associated with sleep apnea, including increased cardiovascular risk and overall death. We talked about treatments briefly. I recommend getting a full night-night nocturnal polysomnography. The patient should return to the clinic to discuss results and treatment option in a patient-centered approach.    History of present illness:    She is a 63 year old female who comes to the clinic with a chief complaint of sleep maintenance insomnia has been going for more than 10 years.  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  As a result he still suffers from chronic fatigue and snoring during sleep.  She also complains of difficulty staying asleep.     Ideal Sleep-Wake Cycle(devoid of societal pressure):      TIME IN BED:    1) Work/School Days:    Do you work or go to school? No   What time do you usually get into bed? 9:45PM   About how long does it take you to fall asleep? a few minutes   How often do you have trouble falling asleep? only once in a while   How often do you wake up during the night? at least 3-4 times   Do you work days/evenings/nights/rotating shifts?    What wakes you up at night?    How often do you have trouble falling back to sleep? it varies,  maybe one night every 10 days   About how long does it take to fall back to sleep? sometimes right away, sometimes an hour or more   What do you usually do if you have trouble getting back to sleep? keep trying to fall asleep, sometimes i'll try reading   What time do you usually get out of bed to start your day? 6:00 am   Do you use an alarm? No   2) Weekends/Non-work Days/All Other Days    What time do you usually get into bed? 9:45PM   About how long does it take you to fall asleep? a couple of minutes   What time do you usually get out of bed to start your day? 6:00AM   Do you use an alarm? No   SLEEP NEED    On average, about how much sleep do you think you get? 4-6 hours total   About how much sleep do you think you need? 8 hrs   SLEEP POSITION    Which sleep positions do you prefer? Side   Do you do any of the following activities in bed? Read   How often do you take a nap on purpose? 0   About how long are your naps?    Do you feel better after naps?    How often do you doze off unintentionally? one   Have you ever had a driving accident or near-miss due to sleepiness/drowsiness? No     Stop Bang Questionnaire    Question 3/14/2024 11:47 AM CDT - Filed by Patient   Do you SNORE loudly (louder than talking or loud enough to be heard through closed doors)? No   Do you often feel TIRED, fatigued, or sleepy during daytime? Yes   Has anyone OBSERVED you stop breathing during your sleep? No   Do you have or are you being treated for high blood PRESSURE? No   BMI more than 35kg/m2? No   AGE over 50 years old? Yes   NECK circumference > 16 inches (40cm)? No   GENDER: Male? No   STOP-BANG Total Score (range: 0 - 8) 3 (High risk of MARY) Critical      Stop Bang Questionnaire    Question 3/14/2024 11:47 AM CDT - Filed by Patient   Do you SNORE loudly (louder than talking or loud enough to be heard through closed doors)? No   Do you often feel TIRED, fatigued, or sleepy during daytime? Yes   Has anyone OBSERVED you stop  breathing during your sleep? No   Do you have or are you being treated for high blood PRESSURE? No   BMI more than 35kg/m2? No   AGE over 50 years old? Yes   NECK circumference > 16 inches (40cm)? No   GENDER: Male? No   STOP-BANG Total Score (range: 0 - 8) 3 (High risk of MARY) Critical        KISHORE:  KISHORE Total Score: 12  Total score - [place]: 5 (3/14/2024 11:46 AM)    Patient told to return in one week after the sleep study is interpreted.    Patient Active Problem List   Diagnosis    PALPITATIONS - rare    Cervical stenosis (uterine cervix)    CARDIOVASCULAR SCREENING; LDL GOAL LESS THAN 160    24 hr info given    Advanced directives, counseling/discussion       Past Medical History  Past Medical History:   Diagnosis Date    Basal cell carcinoma     Esophageal reflux     Resolved    Palpitations     Holter monitor cleared per patient.     Unspecified acute reaction to stress         Past Surgical History  Past Surgical History:   Procedure Laterality Date    COLONOSCOPY  09/14/2009    Procedure: Colonoscopy Providers: Grey Paulino MD, Isis Dorantes RN    OPEN REDUCTION INTERNAL FIXATION WRIST Right 9/27/2016    Procedure: OPEN REDUCTION INTERNAL FIXATION WRIST;  Surgeon: Sammie Peterson MD;  Location: [facility] OR    REMOVE HARDWARE WRIST Right 9/14/2017    Procedure: REMOVE HARDWARE WRIST;  Right distal radius removal of hardware;  Surgeon: Sammie Peterson MD;  Location:  OR    SURGICAL HISTORY OF -   1980    Extraction of [place] teeth        Meds  Current Outpatient Medications   Medication Sig Dispense Refill    Ascorbic Acid (VITAMIN C PO) Take 500 mg by mouth daily      Cholecalciferol (VITAMIN D) 2000 UNITS CAPS Take 1 chew tab by mouth daily      vitamin B complex with vitamin C (STRESS TAB) tablet Take 1 tablet by mouth daily      albuterol (PROAIR HFA/PROVENTIL HFA/VENTOLIN HFA) 108 (90 Base) MCG/ACT inhaler Albuterol inhaler 2 puffs to be used 5 to 10 minutes before exercise or every 4-6 hours  as needed for wheezing or shortness of breath (Patient not taking: Reported on 3/14/2024) 18 g 0    fluocinonide (LIDEX) 0.05 % external cream Apply topically 2 times daily (Patient not taking: Reported on 12/8/2023) 120 g 6        Allergies  Patient has no known allergies.     Social History  Social History     Socioeconomic History    Marital status:      Spouse name: Not on file    Number of children: Not on file    Years of education: Not on file    Highest education level: Not on file   Occupational History    Not on file   Tobacco Use    Smoking status: Never     Passive exposure: Never    Smokeless tobacco: Never   Vaping Use    Vaping Use: Never used   Substance and Sexual Activity    Alcohol use: Yes     Alcohol/week: 1.7 standard drinks of alcohol     Comment: 2-3 times a month    Drug use: No    Sexual activity: Not Currently     Partners: Male     Birth control/protection: None     Comment: spouse vas   Other Topics Concern    Parent/sibling w/ CABG, MI or angioplasty before 65F 55M? No   Social History Narrative    Not on file     Social Determinants of Health     Financial Resource Strain: Low Risk  (3/5/2024)    Financial Resource Strain     Within the past 12 months, have you or your family members you live with been unable to get utilities (heat, electricity) when it was really needed?: No   Food Insecurity: Low Risk  (3/5/2024)    Food Insecurity     Within the past 12 months, did you worry that your food would run out before you got money to buy more?: No     Within the past 12 months, did the food you bought just not last and you didn t have money to get more?: No   Transportation Needs: Low Risk  (3/5/2024)    Transportation Needs     Within the past 12 months, has lack of transportation kept you from medical appointments, getting your medicines, non-medical meetings or appointments, work, or from getting things that you need?: No   Physical Activity: Sufficiently Active (3/5/2024)     Exercise Vital Sign     Days of Exercise per Week: 4 days     Minutes of Exercise per Session: 50 min   Stress: No Stress Concern Present (3/5/2024)    [employer] [place] [employer] - Occupational Stress Questionnaire     Feeling of Stress : Only a little   Social Connections: Unknown (3/5/2024)    Social Connection and Isolation Panel [NHANES]     Frequency of Communication with Friends and Family: Not on file     Frequency of Social Gatherings with Friends and Family: Once a week     Attends [employer]ist Services: Not on file     Active Member of Clubs or Organizations: Not on file     Attends Club or Organization Meetings: Not on file     Marital Status: Not on file   Interpersonal Safety: Low Risk  (3/8/2024)    Interpersonal Safety     Do you feel physically and emotionally safe where you currently live?: Yes     Within the past 12 months, have you been hit, slapped, kicked or otherwise physically hurt by someone?: No     Within the past 12 months, have you been humiliated or emotionally abused in other ways by your partner or ex-partner?: No   Housing Stability: High Risk (3/5/2024)    Housing Stability     Do you have housing? : No     Are you worried about losing your housing?: No        Family History  Family History   Problem Relation Age of Onset    Hypertension Mother     Depression/Anxiety Mother     Hypertension Father     Snoring Father     Cerebrovascular Disease Maternal Grandfather     Anesthesia Reaction No family hx of      Review of Systems:  Constitutional: Negative except as noted in HPI.   Eyes: Negative except as noted in HPI.   ENT: Negative except as noted in HPI.   Cardiovascular: Negative except as noted in HPI.   Respiratory: Negative except as noted in HPI.   Gastrointestinal: Negative except as noted in HPI.   Genitourinary: Negative except as noted in HPI.   Musculoskeletal: Negative except as noted in HPI.   Integumentary: Negative except as noted in HPI.   Neurological:  "Negative except as noted in HPI.   Psychiatric: Negative except as noted in HPI.   Endocrine: Negative except as noted in HPI.   Hematologic/Lymphatic: Negative except as noted in HPI.      Physical Exam:  /80   Pulse 65   Ht 1.626 m (5' 4\")   Wt 62.4 kg (137 lb 8 oz)   LMP 11/01/2007 (LMP Unknown)   SpO2 100%   BMI 23.60 kg/m    BMI:Body mass index is 23.6 kg/m .   GEN: NAD, appropriate for age  Head: Normocephalic.  EYES: PERRLA, EOMI  ENT: Oropharynx is clear, Blandon class 3+ airway. Uvula is intact  Nasal mucosa is moist without erythema  Neck : Thyroid is within normal limits.   CV: Regular rate and rhythm, S1 & S2 positive.  LUNGS: Bilateral breathsounds heard.   ABDOMEN: Positive bowel sounds in all quadrants, soft, no rebound or guarding  MUSCULOSKELETAL: No leg swelling  SKIN: warm, dry, no rashes  Neurological: Alert, Gait is normal. Strength 5/5 in all extremities.  Psych: normal mood, normal affect     Labs/Studies:     No results found for: \"PH\", \"PHARTERIAL\", \"PO2\", \"FE6LBINXBCE\", \"SAT\", \"PCO2\", \"HCO3\", \"BASEEXCESS\", \"MIREILLE\", \"BEB\"  Lab Results   Component Value Date    TSH 1.19 03/08/2024    TSH 0.97 10/12/2016     Lab Results   Component Value Date    GLC 98 03/08/2024     (H) 10/06/2021     Lab Results   Component Value Date    HGB 13.4 03/08/2024    HGB 13.2 10/06/2021     Lab Results   Component Value Date    BUN 12.9 03/08/2024    BUN 14 10/06/2021    CR 0.77 03/08/2024    CR 0.82 10/06/2021     Lab Results   Component Value Date    AST 42 04/12/2007    ALT 29 04/12/2007    ALKPHOS 54 04/12/2007    BILITOTAL 0.3 04/12/2007    BILICONJ 0.0 04/12/2007     No results found for: \"UAMP\", \"UBARB\", \"BENZODIAZEUR\", \"UCANN\", \"UCOC\", \"OPIT\", \"UPCP\"      Patient verbalized understanding of these issues, agrees with the plan and all questions were answered today. Patient was given an opportuntity to voice any other symptoms or concerns not listed above. Patient did not have any other " symptoms or concerns.         Abhijit Horvath DO,   Board Certified in Internal Medicine and Sleep Medicine    (Note created with Dragon voice recognition and unintended spelling errors and word substitutions may occur)

## 2024-03-14 NOTE — NURSING NOTE
"Chief Complaint   Patient presents with    Consult    Insomnia       Initial /80   Pulse 65   Ht 1.626 m (5' 4\")   Wt 62.4 kg (137 lb 8 oz)   LMP 11/01/2007 (LMP Unknown)   SpO2 100%   BMI 23.60 kg/m   Estimated body mass index is 23.6 kg/m  as calculated from the following:    Height as of this encounter: 1.626 m (5' 4\").    Weight as of this encounter: 62.4 kg (137 lb 8 oz).    Medication Reconciliation: complete    Neck circumference:  33 centimeters.    DME:     Daniela Ivey MA       "

## 2024-03-15 ENCOUNTER — ALLIED HEALTH/NURSE VISIT (OUTPATIENT)
Dept: FAMILY MEDICINE | Facility: CLINIC | Age: 64
End: 2024-03-15
Payer: MEDICARE

## 2024-03-15 DIAGNOSIS — Z23 ENCOUNTER FOR IMMUNIZATION: Primary | ICD-10-CM

## 2024-03-15 PROCEDURE — G0009 ADMIN PNEUMOCOCCAL VACCINE: HCPCS

## 2024-03-15 PROCEDURE — 90677 PCV20 VACCINE IM: CPT

## 2024-03-15 PROCEDURE — 99207 PR NO CHARGE NURSE ONLY: CPT

## 2024-03-15 NOTE — PROGRESS NOTES
Prior to immunization administration, verified patients identity using patient s name and date of birth. Please see Immunization Activity for additional information.     Screening Questionnaire for Adult Immunization    Are you sick today?   No   Do you have allergies to medications, food, a vaccine component or latex?   No   Have you ever had a serious reaction after receiving a vaccination?   No   Do you have a long-term health problem with heart, lung, kidney, or metabolic disease (e.g., diabetes), asthma, a blood disorder, no spleen, complement component deficiency, a cochlear implant, or a spinal fluid leak?  Are you on long-term aspirin therapy?   No   Do you have cancer, leukemia, HIV/AIDS, or any other immune system problem?   No   Do you have a parent, brother, or sister with an immune system problem?   No   In the past 3 months, have you taken medications that affect  your immune system, such as prednisone, other steroids, or anticancer drugs; drugs for the treatment of rheumatoid arthritis, Crohn s disease, or psoriasis; or have you had radiation treatments?   No   Have you had a seizure, or a brain or other nervous system problem?   No   During the past year, have you received a transfusion of blood or blood    products, or been given immune (gamma) globulin or antiviral drug?   No   For women: Are you pregnant or is there a chance you could become       pregnant during the next month?   No   Have you received any vaccinations in the past 4 weeks?   No     Immunization questionnaire answers were all negative.    I have reviewed the following standing orders: This patient is due and qualifies for the Pneumococcal vaccine.    Click here for Pneumococcal (Adult) Standing Order    I have reviewed the vaccines inclusion and exclusion criteria;No concerns regarding eligibility.     Patient instructed to remain in clinic for 15 minutes afterwards, and to report any adverse reactions.     Screening performed by  Tatianna Vital MA on 3/15/2024 at 7:52 AM.

## 2024-03-18 ENCOUNTER — ANCILLARY PROCEDURE (OUTPATIENT)
Dept: ULTRASOUND IMAGING | Facility: CLINIC | Age: 64
End: 2024-03-18
Attending: FAMILY MEDICINE
Payer: MEDICARE

## 2024-03-18 DIAGNOSIS — S15.092D: ICD-10-CM

## 2024-03-18 PROCEDURE — 93880 EXTRACRANIAL BILAT STUDY: CPT | Mod: TC | Performed by: RADIOLOGY

## 2024-03-18 ASSESSMENT — SLEEP AND FATIGUE QUESTIONNAIRES
HOW LIKELY ARE YOU TO NOD OFF OR FALL ASLEEP WHILE LYING DOWN TO REST IN THE AFTERNOON WHEN CIRCUMSTANCES PERMIT: SLIGHT CHANCE OF DOZING
HOW LIKELY ARE YOU TO NOD OFF OR FALL ASLEEP WHILE SITTING INACTIVE IN A PUBLIC PLACE: WOULD NEVER DOZE
HOW LIKELY ARE YOU TO NOD OFF OR FALL ASLEEP WHILE WATCHING TV: SLIGHT CHANCE OF DOZING
HOW LIKELY ARE YOU TO NOD OFF OR FALL ASLEEP WHEN YOU ARE A PASSENGER IN A CAR FOR AN HOUR WITHOUT A BREAK: WOULD NEVER DOZE
HOW LIKELY ARE YOU TO NOD OFF OR FALL ASLEEP WHILE SITTING AND TALKING TO SOMEONE: WOULD NEVER DOZE
HOW LIKELY ARE YOU TO NOD OFF OR FALL ASLEEP WHILE SITTING AND READING: SLIGHT CHANCE OF DOZING
HOW LIKELY ARE YOU TO NOD OFF OR FALL ASLEEP WHILE SITTING QUIETLY AFTER LUNCH WITHOUT ALCOHOL: SLIGHT CHANCE OF DOZING
HOW LIKELY ARE YOU TO NOD OFF OR FALL ASLEEP IN A CAR, WHILE STOPPED FOR A FEW MINUTES IN TRAFFIC: WOULD NEVER DOZE

## 2024-03-21 ENCOUNTER — THERAPY VISIT (OUTPATIENT)
Dept: SLEEP MEDICINE | Facility: CLINIC | Age: 64
End: 2024-03-21
Payer: MEDICARE

## 2024-03-21 DIAGNOSIS — R53.82 CHRONIC FATIGUE: ICD-10-CM

## 2024-03-21 DIAGNOSIS — Z86.69 HISTORY OF OBSTRUCTIVE SLEEP APNEA: ICD-10-CM

## 2024-03-21 DIAGNOSIS — R06.83 SNORING: ICD-10-CM

## 2024-03-21 DIAGNOSIS — G47.00 PERSISTENT INSOMNIA: ICD-10-CM

## 2024-03-21 PROCEDURE — 95810 POLYSOM 6/> YRS 4/> PARAM: CPT | Performed by: INTERNAL MEDICINE

## 2024-03-29 ENCOUNTER — ANESTHESIA EVENT (OUTPATIENT)
Dept: GASTROENTEROLOGY | Facility: CLINIC | Age: 64
End: 2024-03-29
Payer: MEDICARE

## 2024-03-29 NOTE — ANESTHESIA PREPROCEDURE EVALUATION
Anesthesia Pre-Procedure Evaluation    Patient: Carlos Mayo   MRN: [id number] : 1960        Procedure : Procedure(s):  Colonoscopy  Esophagoscopy, gastroscopy, duodenoscopy (EGD), combined          Past Medical History:   Diagnosis Date    Basal cell carcinoma     Esophageal reflux     Resolved    Palpitations     Holter monitor cleared per patient.     Unspecified acute reaction to stress       Past Surgical History:   Procedure Laterality Date    COLONOSCOPY  2009    Procedure: Colonoscopy Providers: Grey Paulino MD, Isis Dorantes RN    OPEN REDUCTION INTERNAL FIXATION WRIST Right 2016    Procedure: OPEN REDUCTION INTERNAL FIXATION WRIST;  Surgeon: Sammie Peterson MD;  Location: [facility]    REMOVE HARDWARE WRIST Right 2017    Procedure: REMOVE HARDWARE WRIST;  Right distal radius removal of hardware;  Surgeon: Sammie Peterson MD;  Location: [facility]    SURGICAL HISTORY OF -       Extraction of [place] teeth      No Known Allergies   Social History     Tobacco Use    Smoking status: Never     Passive exposure: Never    Smokeless tobacco: Never   Substance Use Topics    Alcohol use: Yes     Alcohol/week: 1.7 standard drinks of alcohol     Comment: 2-3 times a month      Wt Readings from Last 1 Encounters:   24 62.4 kg (137 lb 8 oz)        Anesthesia Evaluation   Pt has had prior anesthetic. Type: General, Regional and MAC.        ROS/MED HX  ENT/Pulmonary:       Neurologic:       Cardiovascular:     (+) Dyslipidemia - -   -  - -                          Irregular Heartbeat/Palpitations,            METS/Exercise Tolerance:     Hematologic:       Musculoskeletal:   (+)  arthritis,             GI/Hepatic:     (+) GERD,                   Renal/Genitourinary:       Endo:       Psychiatric/Substance Use:       Infectious Disease:       Malignancy:   (+) Malignancy, History of Skin.Skin CA Remission status post Surgery.      Other:          Physical Exam    Airway  airway exam  "normal           Respiratory Devices and Support         Dental       (+) Minor Abnormalities - some fillings, tiny chips      Cardiovascular   cardiovascular exam normal          Pulmonary   pulmonary exam normal                OUTSIDE LABS:  CBC:   Lab Results   Component Value Date    WBC 6.0 03/08/2024    WBC 7.8 10/06/2021    HGB 13.4 03/08/2024    HGB 13.2 10/06/2021    HCT 42.1 03/08/2024    HCT 41.3 10/06/2021     03/08/2024     10/06/2021     BMP:   Lab Results   Component Value Date     03/08/2024     10/06/2021    POTASSIUM 4.2 03/08/2024    POTASSIUM 5.5 (H) 10/06/2021    CHLORIDE 102 03/08/2024    CHLORIDE 103 10/06/2021    CO2 28 03/08/2024    CO2 31 10/06/2021    BUN 12.9 03/08/2024    BUN 14 10/06/2021    CR 0.77 03/08/2024    CR 0.82 10/06/2021    GLC 98 03/08/2024     (H) 10/06/2021     COAGS:   Lab Results   Component Value Date    PTT 24 08/08/2005    INR 1.10 08/08/2005     POC: No results found for: \"BGM\", \"HCG\", \"HCGS\"  HEPATIC:   Lab Results   Component Value Date    ALBUMIN 4.4 04/12/2007    PROTTOTAL 7.5 04/12/2007    ALT 29 04/12/2007    AST 42 04/12/2007    ALKPHOS 54 04/12/2007    BILITOTAL 0.3 04/12/2007     OTHER:   Lab Results   Component Value Date    A1C 4.9 03/08/2024    STEPHANIE 9.5 03/08/2024    MAG 1.8 08/08/2005    LIPASE 83 04/12/2007    TSH 1.19 03/08/2024       Anesthesia Plan    ASA Status:  3    NPO Status:  NPO Appropriate    Anesthesia Type: General.      Maintenance: Balanced.        Consents    Anesthesia Plan(s) and associated risks, benefits, and realistic alternatives discussed. Questions answered and patient/representative(s) expressed understanding.     - Discussed:     - Discussed with:  Patient            Postoperative Care            Comments:               MANOJ Salazar CRNA    I have reviewed the pertinent notes and labs in the chart from the past 30 days and (re)examined the patient.  Any updates or changes from those " notes are reflected in this note.

## 2024-04-08 ENCOUNTER — ANESTHESIA (OUTPATIENT)
Dept: GASTROENTEROLOGY | Facility: CLINIC | Age: 64
End: 2024-04-08
Payer: MEDICARE

## 2024-04-08 ENCOUNTER — HOSPITAL ENCOUNTER (OUTPATIENT)
Facility: CLINIC | Age: 64
Discharge: HOME | End: 2024-04-08
Attending: SURGERY | Admitting: SURGERY
Payer: MEDICARE

## 2024-04-08 VITALS
BODY MASS INDEX: 23.39 KG/M2 | HEIGHT: 64 IN | TEMPERATURE: 97.8 F | RESPIRATION RATE: 15 BRPM | OXYGEN SATURATION: 95 % | SYSTOLIC BLOOD PRESSURE: 106 MMHG | DIASTOLIC BLOOD PRESSURE: 85 MMHG | WEIGHT: 137 LBS | HEART RATE: 78 BPM

## 2024-04-08 LAB
COLONOSCOPY: NORMAL
UPPER GI ENDOSCOPY: NORMAL

## 2024-04-08 PROCEDURE — 45385 COLONOSCOPY W/LESION REMOVAL: CPT | Mod: PT | Performed by: SURGERY

## 2024-04-08 PROCEDURE — 250N000011 HC RX IP 250 OP 636: Performed by: NURSE ANESTHETIST, CERTIFIED REGISTERED

## 2024-04-08 PROCEDURE — 43249 ESOPH EGD DILATION <30 MM: CPT

## 2024-04-08 PROCEDURE — 250N000009 HC RX 250: Performed by: NURSE ANESTHETIST, CERTIFIED REGISTERED

## 2024-04-08 PROCEDURE — 258N000003 HC RX IP 258 OP 636: Performed by: PHYSICIAN ASSISTANT

## 2024-04-08 PROCEDURE — 43239 EGD BIOPSY SINGLE/MULTIPLE: CPT | Mod: XU | Performed by: SURGERY

## 2024-04-08 PROCEDURE — 370N000017 HC ANESTHESIA TECHNICAL FEE, PER MIN: Performed by: SURGERY

## 2024-04-08 PROCEDURE — 88305 TISSUE EXAM BY PATHOLOGIST: CPT | Mod: TC | Performed by: SURGERY

## 2024-04-08 RX ORDER — SODIUM CHLORIDE, SODIUM LACTATE, POTASSIUM CHLORIDE, CALCIUM CHLORIDE 600; 310; 30; 20 MG/100ML; MG/100ML; MG/100ML; MG/100ML
INJECTION, SOLUTION INTRAVENOUS CONTINUOUS
Status: DISCONTINUED | OUTPATIENT
Start: 2024-04-08 | End: 2024-04-08 | Stop reason: HOSPADM

## 2024-04-08 RX ORDER — PROPOFOL 10 MG/ML
INJECTION, EMULSION INTRAVENOUS CONTINUOUS PRN
Status: DISCONTINUED | OUTPATIENT
Start: 2024-04-08 | End: 2024-04-08

## 2024-04-08 RX ORDER — LIDOCAINE HYDROCHLORIDE 20 MG/ML
INJECTION, SOLUTION INFILTRATION; PERINEURAL PRN
Status: DISCONTINUED | OUTPATIENT
Start: 2024-04-08 | End: 2024-04-08

## 2024-04-08 RX ORDER — LIDOCAINE 40 MG/G
CREAM TOPICAL
Status: DISCONTINUED | OUTPATIENT
Start: 2024-04-08 | End: 2024-04-08 | Stop reason: HOSPADM

## 2024-04-08 RX ORDER — GLYCOPYRROLATE 0.2 MG/ML
INJECTION, SOLUTION INTRAMUSCULAR; INTRAVENOUS PRN
Status: DISCONTINUED | OUTPATIENT
Start: 2024-04-08 | End: 2024-04-08

## 2024-04-08 RX ADMIN — SODIUM CHLORIDE, POTASSIUM CHLORIDE, SODIUM LACTATE AND CALCIUM CHLORIDE: 600; 310; 30; 20 INJECTION, SOLUTION INTRAVENOUS at 10:28

## 2024-04-08 RX ADMIN — PROPOFOL 200 MCG/KG/MIN: 10 INJECTION, EMULSION INTRAVENOUS at 10:29

## 2024-04-08 RX ADMIN — LIDOCAINE HYDROCHLORIDE 100 MG: 20 INJECTION, SOLUTION INFILTRATION; PERINEURAL at 10:29

## 2024-04-08 RX ADMIN — TOPICAL ANESTHETIC 2 SPRAY: 200 SPRAY DENTAL; PERIODONTAL at 10:29

## 2024-04-08 RX ADMIN — GLYCOPYRROLATE 0.2 MG: 0.2 INJECTION, SOLUTION INTRAMUSCULAR; INTRAVENOUS at 10:29

## 2024-04-08 ASSESSMENT — ACTIVITIES OF DAILY LIVING (ADL)
ADLS_ACUITY_SCORE: 35
ADLS_ACUITY_SCORE: 35

## 2024-04-08 NOTE — LETTER
"Carlos Mayo  [address] [place] [id number]  April 17, 2024    Dear Carlos,   This letter is to inform you of the results of your pathology report on your upper endoscopy (EGD).    Your pathology report was:  Final Diagnosis   A: Gastroesophageal junction, biopsy:  - Squamocolumnar junction mucosa with inflammatory/reactive changes.  - Squamous intraepithelial eosinophils (15 per high power field).  - Negative for intestinal metaplasia (\"Gutierrez's esophagus\").  - Negative for dysplasia and malignancy.    B: Stomach, antrum, biopsy:  - Gastric antrum mucosa without diagnostic abnormality.  - Negative for Helicobacter pylori on H&E examination.      Showed findings consistent with reflux (heartburn).  I recommend over-to-counter omeprazole and follow-up with our primary care provider for further follow-up and recommendation.     If you have any questions or concerns please do not hesitate to call my office at [phone].      Sincerely,     [facility] DO Cabral F[facility]OS [facility]                    "

## 2024-04-08 NOTE — INTERVAL H&P NOTE
I have reviewed the surgical (or preoperative) H&P that is linked to this encounter, and examined the patient. There are no significant changes    Colon 2009 (tortuous colon, nl); hx of esophageal stricture?; no blood thinner    Clinical Conditions Present on Arrival:  Clinically Significant Risk Factors Present on Admission

## 2024-04-08 NOTE — LETTER
Carlos Mayo  [address] [place] [id number]    April 17, 2024    Dear Carlos,  This letter is written to inform you of the results of your recent colonoscopy.  Your examination showed polyp(s) in your ascending colon. All polyps were removed in their entirety and sent for review by a pathologist. As you will see on the pathology report below, the tissue(s) were tubular adenomatous polyps. Your examination was otherwise without abnormality.    C: Large intestine, ascending, polyp, biopsy/polypectomy:  - Tubular adenoma negative for high-grade dysplasia and malignancy.            Adenomatous polyps are entirely benign (non-cancerous); however, patients who have developed these polyps are at an increased risk for developing additional polyps in the future. If these are not eventually removed, there is a risk of developing colon cancer. We will advise more frequent examinations with you because of the risk associated with this type of polyp.    Given these findings, I recommend that you undergo a repeat colonoscopy in 7 year(s) for surveillance. We will enter you into a recall system so you receive a reminder closer to the time that you are due for repeat examination.     Please remember that this recommendation is made with the understanding that you are not experiencing persistent changes in bowel function, bleeding per rectum, and/or significant abdominal pain. If you experience these symptoms, please contact your primary care provider for a further evaluation.     If you have any questions or concerns about the results of your colonoscopy or the appropriate follow-up, please contact my assistant at [phone].    Sincerely,        [facility] CabralDO [facility]  ___

## 2024-04-08 NOTE — ANESTHESIA CARE TRANSFER NOTE
Patient: Carlos MARTE Billing    Procedure: Procedure(s):  COLONOSCOPY, FLEXIBLE, WITH LESION REMOVAL USING SNARE  ESOPHAGOGASTRODUODENOSCOPY, WITH BIOPSY       Diagnosis: History of esophageal stricture [Z87.19]  Screen for colon cancer [Z12.11]  Diagnosis Additional Information: No value filed.    Anesthesia Type:   General     Note:    Oropharynx: oropharynx clear of all foreign objects and spontaneously breathing  Level of Consciousness: awake  Oxygen Supplementation: room air    Independent Airway: airway patency satisfactory and stable  Dentition: dentition unchanged  Vital Signs Stable: post-procedure vital signs reviewed and stable  Report to RN Given: handoff report given  Patient transferred to: Phase II    Handoff Report: Identifed the Patient, Identified the Reponsible Provider, Reviewed the pertinent medical history, Discussed the surgical course, Reviewed Intra-OP anesthesia mangement and issues during anesthesia, Set expectations for post-procedure period and Allowed opportunity for questions and acknowledgement of understanding      Vitals:  Vitals Value Taken Time   BP     Temp     Pulse     Resp     SpO2         Electronically Signed By: MANOJ Corona CRNA  April 8, 2024  10:57 AM

## 2024-04-09 LAB
PATH REPORT.COMMENTS IMP SPEC: NORMAL
PATH REPORT.COMMENTS IMP SPEC: NORMAL
PATH REPORT.FINAL DX SPEC: NORMAL
PATH REPORT.GROSS SPEC: NORMAL
PATH REPORT.MICROSCOPIC SPEC OTHER STN: NORMAL
PATH REPORT.RELEVANT HX SPEC: NORMAL
PHOTO IMAGE: NORMAL

## 2024-04-09 PROCEDURE — 88305 TISSUE EXAM BY PATHOLOGIST: CPT | Mod: 26 | Performed by: PATHOLOGY

## 2024-04-15 LAB — SLPCOMP: NORMAL

## 2024-04-18 ENCOUNTER — OFFICE VISIT (OUTPATIENT)
Dept: DERMATOLOGY | Facility: CLINIC | Age: 64
End: 2024-04-18
Payer: MEDICARE

## 2024-04-18 DIAGNOSIS — D22.9 MULTIPLE BENIGN NEVI: ICD-10-CM

## 2024-04-18 DIAGNOSIS — Z85.828 HISTORY OF SKIN CANCER: ICD-10-CM

## 2024-04-18 DIAGNOSIS — L81.4 LENTIGO: Primary | ICD-10-CM

## 2024-04-18 DIAGNOSIS — L82.1 SEBORRHEIC KERATOSIS: ICD-10-CM

## 2024-04-18 DIAGNOSIS — D18.01 ANGIOMA OF SKIN: ICD-10-CM

## 2024-04-18 PROCEDURE — 99213 OFFICE O/P EST LOW 20 MIN: CPT | Performed by: PHYSICIAN ASSISTANT

## 2024-04-18 NOTE — PROGRESS NOTES
Carlos Mayo is an extremely pleasant 63 year old year old female patient here today for hx of non-melanoma skin cancer.  She denies any new or changing skin lesions. She does note a few dry areas on back and in left nostril. Patient has no other skin complaints today.  Remainder of the HPI, Meds, PMH, Allergies, FH, and SH was reviewed in chart.      Past Medical History:   Diagnosis Date    Basal cell carcinoma     Esophageal reflux     Resolved    Palpitations     Holter monitor cleared per patient.     Unspecified acute reaction to stress        Past Surgical History:   Procedure Laterality Date    COLONOSCOPY  09/14/2009    Procedure: Colonoscopy Providers: Grey Paulino MD, Isis Dorantes RN    COLONOSCOPY N/A 4/8/2024    Procedure: COLONOSCOPY, FLEXIBLE, WITH LESION REMOVAL USING SNARE;  Surgeon: Fly Cabral MD;  [facility]: [facility]    ESOPHAGOSCOPY, GASTROSCOPY, DUODENOSCOPY (EGD), COMBINED N/A 4/8/2024    Procedure: ESOPHAGOGASTRODUODENOSCOPY, WITH BIOPSY;  Surgeon: Fly Cabral MD;  Location: [facility]    OPEN REDUCTION INTERNAL FIXATION WRIST Right 9/27/2016    Procedure: OPEN REDUCTION INTERNAL FIXATION WRIST;  Surgeon: Sammie Peterson MD;  Location: [facility]    REMOVE HARDWARE WRIST Right 9/14/2017    Procedure: REMOVE HARDWARE WRIST;  Right distal radius removal of hardware;  Surgeon: Sammie Peterson MD;  Location:  OR    SURGICAL HISTORY OF -   1980    Extraction of [place] teeth        Family History   Problem Relation Age of Onset    Hypertension Mother     Depression/Anxiety Mother     Hypertension Father     Snoring Father     Cerebrovascular Disease Maternal Grandfather     Anesthesia Reaction No family hx of        Social History     Socioeconomic History    Marital status:      Spouse name: Not on file    Number of children: Not on file    Years of education: Not on file    Highest education level: Not on file   Occupational History    Not on file   Tobacco Use    Smoking  status: Never     Passive exposure: Never    Smokeless tobacco: Never   Vaping Use    Vaping status: Never Used   Substance and Sexual Activity    Alcohol use: Yes     Alcohol/week: 1.7 standard drinks of alcohol     Comment: 2-3 times a month    Drug use: No    Sexual activity: Not Currently     Partners: Male     Birth control/protection: None     Comment: spouse vas   Other Topics Concern    Parent/sibling w/ CABG, MI or angioplasty before 65F 55M? No   Social History Narrative    Not on file     Social Determinants of Health     Financial Resource Strain: Low Risk  (3/5/2024)    Financial Resource Strain     Within the past 12 months, have you or your family members you live with been unable to get utilities (heat, electricity) when it was really needed?: No   Food Insecurity: Low Risk  (3/5/2024)    Food Insecurity     Within the past 12 months, did you worry that your food would run out before you got money to buy more?: No     Within the past 12 months, did the food you bought just not last and you didn t have money to get more?: No   Transportation Needs: Low Risk  (3/5/2024)    Transportation Needs     Within the past 12 months, has lack of transportation kept you from medical appointments, getting your medicines, non-medical meetings or appointments, work, or from getting things that you need?: No   Physical Activity: Sufficiently Active (3/5/2024)    Exercise Vital Sign     Days of Exercise per Week: 4 days     Minutes of Exercise per Session: 50 min   Stress: No Stress Concern Present (3/5/2024)    [employer] - Occupational Stress Questionnaire     Feeling of Stress : Only a little   Social Connections: Unknown (3/5/2024)    Social Connection and Isolation Panel [NHANES]     Frequency of Communication with Friends and Family: Not on file     Frequency of Social Gatherings with Friends and Family: Once a week     Attends Rastafarian Services: Not on file     Active Member of  Clubs or Organizations: Not on file     Attends Club or Organization Meetings: Not on file     Marital Status: Not on file   Interpersonal Safety: Low Risk  (3/8/2024)    Interpersonal Safety     Do you feel physically and emotionally safe where you currently live?: Yes     Within the past 12 months, have you been hit, slapped, kicked or otherwise physically hurt by someone?: No     Within the past 12 months, have you been humiliated or emotionally abused in other ways by your partner or ex-partner?: No   Housing Stability: High Risk (3/5/2024)    Housing Stability     Do you have housing? : No     Are you worried about losing your housing?: No       Outpatient Encounter Medications as of 4/18/2024   Medication Sig Dispense Refill    albuterol (PROAIR HFA/PROVENTIL HFA/VENTOLIN HFA) 108 (90 Base) MCG/ACT inhaler Albuterol inhaler 2 puffs to be used 5 to 10 minutes before exercise or every 4-6 hours as needed for wheezing or shortness of breath 18 g 0    Ascorbic Acid (VITAMIN C PO) Take 500 mg by mouth daily      Cholecalciferol (VITAMIN D) 2000 UNITS CAPS Take 1 chew tab by mouth daily      fluocinonide (LIDEX) 0.05 % external cream Apply topically 2 times daily (Patient not taking: Reported on 12/8/2023) 120 g 6    vitamin B complex with vitamin C (STRESS TAB) tablet Take 1 tablet by mouth daily       No facility-administered encounter medications on file as of 4/18/2024.             O:   NAD, WDWN, Alert & Oriented, Mood & Affect wnl, Vitals stable   Here today alone    General appearance normal   Vitals stable   Alert, oriented and in no acute distress        Stuck on papules and brown macules on trunk and ext   Red papules on trunk  Brown papules and macules with regular pigment network and borders on torso and extremities   Mild skin irritation on entrance of left nare   The remainder of the full exam was normal; the following areas were examined:  conjunctiva/lids, , neck, peripheral vascular system, abdomen,  lymph nodes, digits/nails, eccrine and apocrine glands, scalp/hair, face, neck, chest, abdomen, buttocks, back, RUE, LUE, RLE, LLE       Eyes: Conjunctivae/lids:Normal     ENT: Lips normal    MSK:Normal    Cardiovascular: peripheral edema none    Pulm: Breathing Normal    Neuro/Psych: Orientation:Alert and Orientedx3 ; Mood/Affect:normal       A/P:  1. Seborrheic keratosis, lentigo, angioma, dermal nevus, hx of non-melanoma skin cancer   Recommend vaseline three times daily, can use otc cortisone for next 1-2 weeks if not improving see ENT  It was a pleasure speaking to Carlos Mayo today.  Previous clinic notes and pertinent laboratory tests were reviewed prior to Carlos Mayo's visit.  Nature of benign skin lesions dicussed with patient.  Signs and Symptoms of skin cancer discussed with patient.  Patient encouraged to perform monthly skin exams.  UV precautions reviewed with patient.  Risks of non-melanoma skin cancer discussed with patient   Return to clinic 12 months

## 2024-04-18 NOTE — PATIENT INSTRUCTIONS
For nose: Apply Vaseline mixed with over the counter cortisone cream 3 times a day. Run a humidifier in the house to moisturize air.  Apply sunscreen every hour in the water and every 2 hours if on land.      Patient Education       Proper skin care from [facility]:    -Eliminate harsh soaps as they strip the natural oils from the skin, often resulting in dry itchy skin ( i.e. Dial, Zest, [place] Spring)  -Use mild soaps such as Cetaphil or Dove Sensitive Skin in the shower. You do not need to use soap on arms, legs, and trunk every time you shower unless visibly soiled.   -Avoid hot or cold showers.  -After showering, lightly dry off and apply moisturizing within 2-3 minutes. This will help trap moisture in the skin.   -Aggressive use of a moisturizer at least 1-2 times a day to the entire body (including -Vanicream, Cetaphil, Aquaphor or Cerave) and moisturize hands after every washing.  -We recommend using moisturizers that come in a tub that needs to be scooped out, not a pump. This has more of an oil base. It will hold moisture in your skin much better than a water base moisturizer. The above recommended are non-pore clogging.      Wear a sunscreen with at least SPF 30 on your face, ears, neck and V of the chest daily. Wear sunscreen on other areas of the body if those areas are exposed to the sun throughout the day. Sunscreens can contain physical and/or chemical blockers. Physical blockers are less likely to clog pores, these include zinc oxide and titanium dioxide. Reapply every two hour and after swimming.     Sunscreen examples: https://www.ewg.org/sunscreen/    UV radiation  UVA radiation remains constant throughout the day and throughout the year. It is a longer wavelength than UVB and therefore penetrates deeper into the skin leading to immediate and delayed tanning, photoaging, and skin cancer. 70-80% of UVA and UVB radiation occurs between the hours of 10am-2pm.  UVB radiation  UVB radiation  causes the most harmful effects and is more significant during the summer months. However, snow and ice can reflect UVB radiation leading to skin damage during the winter months as well. UVB radiation is responsible for tanning, burning, inflammation, delayed erythema (pinkness), pigmentation (brown spots), and skin cancer.     I recommend self monthly full body exams and yearly full body exams with a dermatology provider. If you develop a new or changing lesion please follow up for examination. Most skin cancers are pink and scaly or pink and pearly. However, we do see blue/brown/black skin cancers.  Consider the ABCDEs of melanoma when giving yourself your monthly full body exam ( don't forget the groin, buttocks, feet, toes, etc). A-asymmetry, B-borders, C-color, D-diameter, E-elevation or evolving. If you see any of these changes please follow up in clinic. If you cannot see your back I recommend purchasing a hand held mirror to use with a larger wall mirror.       Checking for Skin Cancer  You can find cancer early by checking your skin each month. There are 3 kinds of skin cancer. They are melanoma, basal cell carcinoma, and squamous cell carcinoma. Doing monthly skin checks is the best way to find new marks or skin changes. Follow the instructions below for checking your skin.   The ABCDEs of checking moles for melanoma   Check your moles or growths for signs of melanoma using ABCDE:   Asymmetry: the sides of the mole or growth don t match  Border: the edges are ragged, notched, or blurred  Color: the color within the mole or growth varies  Diameter: the mole or growth is larger than 6 mm (size of a pencil eraser)  Evolving: the size, shape, or color of the mole or growth is changing (evolving is not shown in the images below)    Checking for other types of skin cancer  Basal cell carcinoma or squamous cell carcinoma have symptoms such as:     A spot or mole that looks different from all other marks on your  skin  Changes in how an area feels, such as itching, tenderness, or pain  Changes in the skin's surface, such as oozing, bleeding, or scaliness  A sore that does not heal  New swelling or redness beyond the border of a mole    Who s at risk?  Anyone can get skin cancer. But you are at greater risk if you have:   Fair skin, light-colored hair, or light-colored eyes  Many moles or abnormal moles on your skin  A history of sunburns from sunlight or tanning beds  A family history of skin cancer  A history of exposure to radiation or chemicals  A weakened immune system  If you have had skin cancer in the past, you are at risk for recurring skin cancer.   How to check your skin  Do your monthly skin checkups in front of a full-length mirror. Check all parts of your body, including your:   Head (ears, face, neck, and scalp)  Torso (front, back, and sides)  Arms (tops, undersides, upper, and lower armpits)  Hands (palms, backs, and fingers, including under the nails)  Buttocks and genitals  Legs (front, back, and sides)  Feet (tops, soles, toes, including under the nails, and between toes)  If you have a lot of moles, take digital photos of them each month. Make sure to take photos both up close and from a distance. These can help you see if any moles change over time.   Most skin changes are not cancer. But if you see any changes in your skin, call your doctor right away. Only he or she can diagnose a problem. If you have skin cancer, seeing your doctor can be the first step toward getting the treatment that could save your life.   [employer] last reviewed this educational content on 4/1/2019 2000-2020 [employer]. [address] [place] [address], [place] [address]. All rights reserved. This information is not intended as a substitute for professional medical care. Always follow your healthcare professional's instructions.       When should I call my doctor?  If you are worsening or not improving, please, contact us  or seek urgent care as noted below.     Who should I call with questions (adults)?  [facility] (adult and pediatric): [phone]  [facility] (adult): [phone]  [facility] ([place], [place], [place] and [place]) [phone]  For urgent needs outside of business hours call the [facility] at [phone] and ask for the dermatology resident on call to be paged  If this is a medical emergency and you are unable to reach an ER, Call 911      If you need a prescription refill, please contact your pharmacy. Refills are approved or denied by our Physicians during normal business hours, Monday through Fridays  Per office policy, refills will not be granted if you have not been seen within the past year (or sooner depending on your child's condition)

## 2024-04-18 NOTE — LETTER
4/18/2024         RE: Carlos Mayo  [address]  [facility] [address]        Dear Colleague,    Thank you for referring your patient, Carlos Mayo, to the [facility]. Please see a copy of my visit note below.    Carlos Mayo is an extremely pleasant 63 year old year old female patient here today for hx of non-melanoma skin cancer.  She denies any new or changing skin lesions. She does note a few dry areas on back and in left nostril. Patient has no other skin complaints today.  Remainder of the HPI, Meds, PMH, Allergies, FH, and SH was reviewed in chart.      Past Medical History:   Diagnosis Date     Basal cell carcinoma      Esophageal reflux     Resolved     Palpitations     Holter monitor cleared per patient.      Unspecified acute reaction to stress        Past Surgical History:   Procedure Laterality Date     COLONOSCOPY  09/14/2009    Procedure: Colonoscopy Providers: Grey Paulino MD, Isis Dorantes RN     COLONOSCOPY N/A 4/8/2024    Procedure: COLONOSCOPY, FLEXIBLE, WITH LESION REMOVAL USING SNARE;  Surgeon: Fly Cabral MD;  Location: [facility]     ESOPHAGOSCOPY, GASTROSCOPY, DUODENOSCOPY (EGD), COMBINED N/A 4/8/2024    Procedure: ESOPHAGOGASTRODUODENOSCOPY, WITH BIOPSY;  Surgeon: Fly Cabral MD;  Location: [facility]     OPEN REDUCTION INTERNAL FIXATION WRIST Right 9/27/2016    Procedure: OPEN REDUCTION INTERNAL FIXATION WRIST;  Surgeon: Sammie Peterson MD;  Location: [facility]     REMOVE HARDWARE WRIST Right 9/14/2017    Procedure: REMOVE HARDWARE WRIST;  Right distal radius removal of hardware;  Surgeon: Sammie Peterson MD;  Location: [facility] OR     SURGICAL HISTORY OF -   1980    Extraction of [place] teeth        Family History   Problem Relation Age of Onset     Hypertension Mother      Depression/Anxiety Mother      Hypertension Father      Snoring Father      Cerebrovascular Disease Maternal Grandfather      Anesthesia Reaction No family hx of         Social History     Socioeconomic History     Marital status:      Spouse name: Not on file     Number of children: Not on file     Years of education: Not on file     Highest education level: Not on file   Occupational History     Not on file   Tobacco Use     Smoking status: Never     Passive exposure: Never     Smokeless tobacco: Never   Vaping Use     Vaping status: Never Used   Substance and Sexual Activity     Alcohol use: Yes     Alcohol/week: 1.7 standard drinks of alcohol     Comment: 2-3 times a month     Drug use: No     Sexual activity: Not Currently     Partners: Male     Birth control/protection: None     Comment: spouse vas   Other Topics Concern     Parent/sibling w/ CABG, MI or angioplasty before 65F 55M? No   Social History Narrative     Not on file     Social Determinants of Health     Financial Resource Strain: Low Risk  (3/5/2024)    Financial Resource Strain      Within the past 12 months, have you or your family members you live with been unable to get utilities (heat, electricity) when it was really needed?: No   Food Insecurity: Low Risk  (3/5/2024)    Food Insecurity      Within the past 12 months, did you worry that your food would run out before you got money to buy more?: No      Within the past 12 months, did the food you bought just not last and you didn t have money to get more?: No   Transportation Needs: Low Risk  (3/5/2024)    Transportation Needs      Within the past 12 months, has lack of transportation kept you from medical appointments, getting your medicines, non-medical meetings or appointments, work, or from getting things that you need?: No   Physical Activity: Sufficiently Active (3/5/2024)    Exercise Vital Sign      Days of Exercise per Week: 4 days      Minutes of Exercise per Session: 50 min   Stress: No Stress Concern Present (3/5/2024)    [employer] [place] [employer] - Occupational Stress Questionnaire      Feeling of Stress : Only a little    Social Connections: Unknown (3/5/2024)    Social Connection and Isolation Panel [NHANES]      Frequency of Communication with Friends and Family: Not on file      Frequency of Social Gatherings with Friends and Family: Once a week      Attends Spiritism Services: Not on file      Active Member of Clubs or Organizations: Not on file      Attends Club or Organization Meetings: Not on file      Marital Status: Not on file   Interpersonal Safety: Low Risk  (3/8/2024)    Interpersonal Safety      Do you feel physically and emotionally safe where you currently live?: Yes      Within the past 12 months, have you been hit, slapped, kicked or otherwise physically hurt by someone?: No      Within the past 12 months, have you been humiliated or emotionally abused in other ways by your partner or ex-partner?: No   Housing Stability: High Risk (3/5/2024)    Housing Stability      Do you have housing? : No      Are you worried about losing your housing?: No       Outpatient Encounter Medications as of 4/18/2024   Medication Sig Dispense Refill     albuterol (PROAIR HFA/PROVENTIL HFA/VENTOLIN HFA) 108 (90 Base) MCG/ACT inhaler Albuterol inhaler 2 puffs to be used 5 to 10 minutes before exercise or every 4-6 hours as needed for wheezing or shortness of breath 18 g 0     Ascorbic Acid (VITAMIN C PO) Take 500 mg by mouth daily       Cholecalciferol (VITAMIN D) 2000 UNITS CAPS Take 1 chew tab by mouth daily       fluocinonide (LIDEX) 0.05 % external cream Apply topically 2 times daily (Patient not taking: Reported on 12/8/2023) 120 g 6     vitamin B complex with vitamin C (STRESS TAB) tablet Take 1 tablet by mouth daily       No facility-administered encounter medications on file as of 4/18/2024.             O:   NAD, WDWN, Alert & Oriented, Mood & Affect wnl, Vitals stable   Here today alone    General appearance normal   Vitals stable   Alert, oriented and in no acute distress        Stuck on papules and brown macules on trunk and  ext   Red papules on trunk  Brown papules and macules with regular pigment network and borders on torso and extremities   Mild skin irritation on entrance of left nare   The remainder of the full exam was normal; the following areas were examined:  conjunctiva/lids, , neck, peripheral vascular system, abdomen, lymph nodes, digits/nails, eccrine and apocrine glands, scalp/hair, face, neck, chest, abdomen, buttocks, back, RUE, LUE, RLE, LLE       Eyes: Conjunctivae/lids:Normal     ENT: Lips normal    MSK:Normal    Cardiovascular: peripheral edema none    Pulm: Breathing Normal    Neuro/Psych: Orientation:Alert and Orientedx3 ; Mood/Affect:normal       A/P:  1. Seborrheic keratosis, lentigo, angioma, dermal nevus, hx of non-melanoma skin cancer   Recommend vaseline three times daily, can use otc cortisone for next 1-2 weeks if not improving see ENT  It was a pleasure speaking to Carlos Mayo today.  Previous clinic notes and pertinent laboratory tests were reviewed prior to Carlos Mayo's visit.  Nature of benign skin lesions dicussed with patient.  Signs and Symptoms of skin cancer discussed with patient.  Patient encouraged to perform monthly skin exams.  UV precautions reviewed with patient.  Risks of non-melanoma skin cancer discussed with patient   Return to clinic 12 months      Again, thank you for allowing me to participate in the care of your patient.        Sincerely,        Jolanta Kirby PA-C

## 2024-04-23 ENCOUNTER — TELEPHONE (OUTPATIENT)
Dept: SLEEP MEDICINE | Facility: CLINIC | Age: 64
End: 2024-04-23

## 2024-05-03 ENCOUNTER — OFFICE VISIT (OUTPATIENT)
Dept: FAMILY MEDICINE | Facility: CLINIC | Age: 64
End: 2024-05-03
Payer: MEDICARE

## 2024-05-03 ENCOUNTER — ANCILLARY PROCEDURE (OUTPATIENT)
Dept: GENERAL RADIOLOGY | Facility: CLINIC | Age: 64
End: 2024-05-03
Attending: FAMILY MEDICINE
Payer: MEDICARE

## 2024-05-03 VITALS
HEIGHT: 64 IN | HEART RATE: 70 BPM | BODY MASS INDEX: 23.22 KG/M2 | SYSTOLIC BLOOD PRESSURE: 108 MMHG | TEMPERATURE: 98 F | WEIGHT: 136 LBS | DIASTOLIC BLOOD PRESSURE: 60 MMHG | RESPIRATION RATE: 16 BRPM | OXYGEN SATURATION: 99 %

## 2024-05-03 DIAGNOSIS — S92.324A CLOSED NONDISPLACED FRACTURE OF SECOND METATARSAL BONE OF RIGHT FOOT, INITIAL ENCOUNTER: Primary | ICD-10-CM

## 2024-05-03 DIAGNOSIS — M85.88 OSTEOPENIA OF LUMBAR SPINE: ICD-10-CM

## 2024-05-03 DIAGNOSIS — M79.671 RIGHT FOOT PAIN: ICD-10-CM

## 2024-05-03 PROCEDURE — 99214 OFFICE O/P EST MOD 30 MIN: CPT | Performed by: FAMILY MEDICINE

## 2024-05-03 PROCEDURE — 73630 X-RAY EXAM OF FOOT: CPT | Mod: TC | Performed by: RADIOLOGY

## 2024-05-03 RX ORDER — RESPIRATORY SYNCYTIAL VIRUS VACCINE 120MCG/0.5
0.5 KIT INTRAMUSCULAR ONCE
Qty: 1 EACH | Refills: 0 | Status: CANCELLED | OUTPATIENT
Start: 2024-05-03 | End: 2024-05-03

## 2024-05-03 NOTE — PROGRESS NOTES
Assessment & Plan     (S92.324A) Closed nondisplaced fracture of second metatarsal bone of right foot, initial encounter  (primary encounter diagnosis)  Comment: Minimally displaced fracture, onset after wearing high heels.  No evidence of neurovascular compromise.  Fracture occurred approximately 3 weeks ago.  She has been tolerating walking reasonably well, overall pain is improving.  Plan: XR Foot Right G/E 3 Views        See patient instructions.    (M85.88) Osteopenia of lumbar spine  Comment: DEXA scan from 2017 showed osteopenia.  Plan: Will readdress at upcoming visit.    Results for orders placed or performed in visit on 05/03/24   XR Foot Right G/E 3 Views     Status: None    Narrative    XR FOOT RIGHT G/E 3 VIEWS   5/3/2024 3:18 PM     HISTORY:  Right foot pain    Comparison: None.      Impression    IMPRESSION:  1. Mildly displaced fracture of the distal shaft of the second  metatarsal. There is a small amount of surrounding bridging callus  indicating that this is subacute and healing. No angulation.  2. Mild osteoarthrosis of the first MTP joint.  3. Small plantar calcaneal enthesophyte.    SHAHRZAD LEZAMA MD         SYSTEM ID:  [id number]        Patient Instructions   You have a fracture bone in your foot.     Wear a comfortable shoe.     Listen to your foot.     Follow up appointment in one month.     Call or My Chart with any questions or concerns.        Ling Solis is a 63 year old, presenting for the following health issues:  Foot Pain      5/3/2024     2:15 PM   Additional Questions   Roomed by Louise AGUIAR   Accompanied by none         5/3/2024     2:15 PM   Patient Reported Additional Medications   Patient reports taking the following new medications No new medications to add     [facility] Hpi General Questionnaire    Question 4/28/2024  7:21 AM CDT - Filed by Patient   I understand that completing this form is intended to provide my doctor and/or care team with helpful information for my  "upcoming clinic visit. It is not to notify my doctor and/or care team of medical matters requiring urgent attention. If I have an urgent medical matter, I should call 911 or my doctor's office. Acknowledge   Please select a reason for your visit: New illness or symptom   What illness or symptom are you coming in for today? Other   How many servings of fruits and vegetables do you eat daily? 0-1   On average, how many sweetened beverages do you drink each day (Examples: soda, juice, sweet tea, etc.  Do NOT count diet or artificially sweetened beverages)? 1   How many minutes a day do you exercise enough to make your heart beat faster? 30 to 60   How many days a week do you exercise enough to make your heart beat faster? 4   How many days per week do you miss taking your medication? 0   What is the reason for your visit today? right foot pain for 2 weeks   When did your symptoms begin? 1-2 weeks ago   What are your symptoms? difficulty walking, pain and swelling on top of foot   How would you describe these symptoms? Moderate   Are your symptoms: Staying the same   Have you had these symptoms before? No   Is there anything that makes you feel worse? walking   Is there anything that makes you feel better? resting             Review of Systems  CONSTITUTIONAL: NEGATIVE for fever, chills, change in weight  ENT/MOUTH: NEGATIVE for ear, mouth and throat problems  RESP: NEGATIVE for significant cough or SOB  CV: NEGATIVE for chest pain, palpitations or peripheral edema  MUSCULOSKELETAL: Right foot pain.      Objective    /60 (BP Location: Right arm, Patient Position: Chair, Cuff Size: Adult Regular)   Pulse 70   Temp 98  F (36.7  C) (Oral)   Resp 16   Ht 1.626 m (5' 4\")   Wt 61.7 kg (136 lb)   LMP 11/01/2007 (LMP Unknown)   SpO2 99%   BMI 23.34 kg/m    Body mass index is 23.34 kg/m .  Physical Exam   GENERAL: Healthy, alert and no distress  EYES: Eyes grossly normal to inspection, conjunctivae and sclerae " normal  RESP: Lungs clear to auscultation - no rales, rhonchi or wheezes  CV: Regular rate and rhythm, normal S1 S2, no murmur  MS: There is mild edema over the dorsal aspect of the right foot.  Over the second metatarsal, midshaft, there is mild tenderness to palpation.  No ecchymoses seen.  Circulation sensation intact.  NEURO: Normal strength and tone, mentation intact and speech normal  PSYCH: Mentation appears normal, affect normal/bright           Signed Electronically by: Mayelin Briggs MD

## 2024-05-03 NOTE — PATIENT INSTRUCTIONS
You have a fracture bone in your foot.     Wear a comfortable shoe.     Listen to your foot.     Follow up appointment in one month.     Call or My Chart with any questions or concerns.

## 2024-06-04 ENCOUNTER — OFFICE VISIT (OUTPATIENT)
Dept: FAMILY MEDICINE | Facility: CLINIC | Age: 64
End: 2024-06-04
Payer: MEDICARE

## 2024-06-04 ENCOUNTER — ANCILLARY PROCEDURE (OUTPATIENT)
Dept: GENERAL RADIOLOGY | Facility: CLINIC | Age: 64
End: 2024-06-04
Attending: FAMILY MEDICINE
Payer: MEDICARE

## 2024-06-04 VITALS
DIASTOLIC BLOOD PRESSURE: 58 MMHG | OXYGEN SATURATION: 100 % | RESPIRATION RATE: 16 BRPM | TEMPERATURE: 98.2 F | HEART RATE: 72 BPM | BODY MASS INDEX: 23.83 KG/M2 | WEIGHT: 139.6 LBS | SYSTOLIC BLOOD PRESSURE: 124 MMHG | HEIGHT: 64 IN

## 2024-06-04 DIAGNOSIS — S92.324A CLOSED NONDISPLACED FRACTURE OF SECOND METATARSAL BONE OF RIGHT FOOT, INITIAL ENCOUNTER: ICD-10-CM

## 2024-06-04 DIAGNOSIS — S92.324A CLOSED NONDISPLACED FRACTURE OF SECOND METATARSAL BONE OF RIGHT FOOT, INITIAL ENCOUNTER: Primary | ICD-10-CM

## 2024-06-04 DIAGNOSIS — M85.871 OTHER SPECIFIED DISORDERS OF BONE DENSITY AND STRUCTURE, RIGHT ANKLE AND FOOT: ICD-10-CM

## 2024-06-04 PROCEDURE — 73630 X-RAY EXAM OF FOOT: CPT | Mod: TC | Performed by: RADIOLOGY

## 2024-06-04 PROCEDURE — 99214 OFFICE O/P EST MOD 30 MIN: CPT | Performed by: FAMILY MEDICINE

## 2024-06-04 ASSESSMENT — PAIN SCALES - GENERAL: PAINLEVEL: NO PAIN (1)

## 2024-06-04 NOTE — PATIENT INSTRUCTIONS
Overall, your foot is healing well.     To schedule a bone density test (DEXA), you may use [facility], or call [phone].    It will be another month for the bone to completely heal.     For activity, just listen to your foot.    145

## 2024-06-04 NOTE — PROGRESS NOTES
Assessment & Plan     (G62.238K) Closed nondisplaced fracture of second metatarsal bone of right foot, initial encounter  (primary encounter diagnosis)  Comment: Clinically improved.  She still notes some discomfort while walking on the treadmill.  With day-to-day activities, she states she is pain-free.  X-ray today shows appropriate callus formation suggestive of normal healing.  Plan: XR Foot Right G/E 3 Views, DX Bone Density        Advised activity as tolerated, use the pain as a guide for activity.  No further specific follow-up needed unless symptoms worsen or there is no improvement within 1 month.    (M05.049) Other specified disorders of bone density and structure, right ankle and foot  Comment: Given that this would be her second fracture, prior wrist fracture, will refer for bone density screening.  Plan: DX Bone Density        Await test results.    X-ray per my interpretation: Appropriate callus formation noted from prior x-ray, minimal displacement, good alignment.    Patient Instructions   Overall, your foot is healing well.     To schedule a bone density test (DEXA), you may use [facility], or call [phone].    It will be another month for the bone to completely heal.     For activity, just listen to your foot.        Ling Solis is a 63 year old, presenting for the following health issues:  Musculoskeletal Problem      6/4/2024    10:07 AM   Additional Questions   Roomed by Riya   Accompanied by Self         6/4/2024    10:07 AM   Patient Reported Additional Medications   Patient reports taking the following new medications N/A     Musculoskeletal Problem    History of Present Illness       Reason for visit:  F/U on broken bone in right foot    She eats 2-3 servings of fruits and vegetables daily.She consumes 0 sweetened beverage(s) daily.She exercises with enough effort to increase her heart rate 30 to 60 minutes per day.  She exercises with enough effort to increase her heart rate  "4 days per week.   She is taking medications regularly.       Follow up right broken foot. She is doing much better          Review of Systems  Constitutional, HEENT, cardiovascular, pulmonary, gi and gu systems are negative, except as otherwise noted.      Objective    /58   Pulse 72   Temp 98.2  F (36.8  C) (Tympanic)   Resp 16   Ht 1.626 m (5' 4.02\")   Wt 63.3 kg (139 lb 9.6 oz)   LMP 11/01/2007 (LMP Unknown)   SpO2 100%   BMI 23.95 kg/m    Body mass index is 23.95 kg/m .  Physical Exam   GENERAL: Healthy, alert and no distress  EYES: Eyes grossly normal to inspection, conjunctivae and sclerae normal  RESP: Lungs clear to auscultation - no rales, rhonchi or wheezes  CV: Regular rate and rhythm, normal S1 S2, no murmur  MS: Right foot shows no obvious deformity, prior ecchymosis has resolved.  There is mild pain to deep palpation mid to forefoot without crepitance.  Circulation and sensation appear to be intact.  NEURO: Normal strength and tone, mentation intact and speech normal  PSYCH: Mentation appears normal, affect normal/bright         Signed Electronically by: Mayelin Briggs MD    "

## 2024-07-03 ENCOUNTER — ANCILLARY PROCEDURE (OUTPATIENT)
Dept: BONE DENSITY | Facility: CLINIC | Age: 64
End: 2024-07-03
Attending: FAMILY MEDICINE
Payer: MEDICARE

## 2024-07-03 DIAGNOSIS — S92.324A CLOSED NONDISPLACED FRACTURE OF SECOND METATARSAL BONE OF RIGHT FOOT, INITIAL ENCOUNTER: ICD-10-CM

## 2024-07-03 DIAGNOSIS — M85.871 OTHER SPECIFIED DISORDERS OF BONE DENSITY AND STRUCTURE, RIGHT ANKLE AND FOOT: ICD-10-CM

## 2024-07-03 PROCEDURE — 77080 DXA BONE DENSITY AXIAL: CPT | Performed by: RADIOLOGY

## 2024-07-15 ASSESSMENT — SLEEP AND FATIGUE QUESTIONNAIRES
HOW LIKELY ARE YOU TO NOD OFF OR FALL ASLEEP IN A CAR, WHILE STOPPED FOR A FEW MINUTES IN TRAFFIC: WOULD NEVER DOZE
HOW LIKELY ARE YOU TO NOD OFF OR FALL ASLEEP WHEN YOU ARE A PASSENGER IN A CAR FOR AN HOUR WITHOUT A BREAK: SLIGHT CHANCE OF DOZING
HOW LIKELY ARE YOU TO NOD OFF OR FALL ASLEEP WHILE SITTING AND READING: SLIGHT CHANCE OF DOZING
HOW LIKELY ARE YOU TO NOD OFF OR FALL ASLEEP WHILE LYING DOWN TO REST IN THE AFTERNOON WHEN CIRCUMSTANCES PERMIT: SLIGHT CHANCE OF DOZING
HOW LIKELY ARE YOU TO NOD OFF OR FALL ASLEEP WHILE WATCHING TV: SLIGHT CHANCE OF DOZING
HOW LIKELY ARE YOU TO NOD OFF OR FALL ASLEEP WHILE SITTING AND TALKING TO SOMEONE: WOULD NEVER DOZE
HOW LIKELY ARE YOU TO NOD OFF OR FALL ASLEEP WHILE SITTING INACTIVE IN A PUBLIC PLACE: WOULD NEVER DOZE
HOW LIKELY ARE YOU TO NOD OFF OR FALL ASLEEP WHILE SITTING QUIETLY AFTER LUNCH WITHOUT ALCOHOL: SLIGHT CHANCE OF DOZING

## 2024-07-18 ENCOUNTER — VIRTUAL VISIT (OUTPATIENT)
Dept: SLEEP MEDICINE | Facility: CLINIC | Age: 64
End: 2024-07-18
Payer: MEDICARE

## 2024-07-18 VITALS — WEIGHT: 140 LBS | HEIGHT: 64 IN | BODY MASS INDEX: 23.9 KG/M2

## 2024-07-18 DIAGNOSIS — G47.00 PERSISTENT INSOMNIA: Primary | ICD-10-CM

## 2024-07-18 PROCEDURE — 99213 OFFICE O/P EST LOW 20 MIN: CPT | Mod: 95 | Performed by: INTERNAL MEDICINE

## 2024-07-18 ASSESSMENT — PAIN SCALES - GENERAL: PAINLEVEL: NO PAIN (0)

## 2024-07-18 NOTE — NURSING NOTE
Patient states has not had a flu shot this year.Current patient location:  [facility]    Is the patient currently in the state of [place]? YES    Visit mode:VIDEO    If the visit is dropped, the patient can be reconnected by: VIDEO VISIT: Text to cell phone:   Telephone Information:   Mobile [phone]       Will anyone else be joining the visit? NO  (If patient encounters technical issues they should call [phone]    How would you like to obtain your AVS? MyChart    Are changes needed to the allergy or medication list? No    Are refills needed on medications prescribed by this physician? NO    Reason for visit: GERMAN ANTONF

## 2024-07-18 NOTE — PROGRESS NOTES
Virtual Visit Details    Type of service:  Video Visit   Video Start Time: 9:19 AM  Video End Time:9:36 AM  Originating Location (pt. Location): Home  Distant Location (provider location):  Off-site  Platform used for Video Visit: AmWell    Additional 10 minutes on the date of service was spent performing the following:    -Preparing to see the patient  -Ordering medications, tests, or procedures   -Documenting clinical information in the electronic or other health record     Thank you for the opportunity to participate in the care of Carlos Mayo.     She is a 63 year old  female patient who comes to the sleep medicine clinic for review of sleep study results. The patient had a sleep study performed on 03/21/2024 (AHI=1). Patient was found to have borderline periodic limb movement in sleep. Upon further questioning, she denies any urges to move when sedentary. She also states that while she has difficulty initiating sleep, she finds that reading in bed helps to some degree    Assessment and Plan:  In summary Carlos Mayo is a 63 year old year old female here for review of sleep study results.  1. Persistent insomnia  I reviewed the results of the sleep study in detail with the patient. I recommend that she implement proper sleep hygiene stressing the importance of stimulus control. I will give her a handout on the topic.    KISHORE:  KISHORE Total Score: 10  Total score - Solon: 5 (7/15/2024  7:12 PM)    Patient Active Problem List   Diagnosis    PALPITATIONS - rare    Cervical stenosis (uterine cervix)    CARDIOVASCULAR SCREENING; LDL GOAL LESS THAN 160    24 hr info given       Current Outpatient Medications   Medication Sig Dispense Refill    albuterol (PROAIR HFA/PROVENTIL HFA/VENTOLIN HFA) 108 (90 Base) MCG/ACT inhaler Albuterol inhaler 2 puffs to be used 5 to 10 minutes before exercise or every 4-6 hours as needed for wheezing or shortness of breath (Patient not taking: Reported on 5/3/2024) 18 g 0     Ascorbic Acid (VITAMIN C PO) Take 500 mg by mouth daily      Cholecalciferol (VITAMIN D) 2000 UNITS CAPS Take 1 chew tab by mouth daily      fluocinonide (LIDEX) 0.05 % external cream Apply topically 2 times daily (Patient not taking: Reported on 12/8/2023) 120 g 6    vitamin B complex with vitamin C (STRESS TAB) tablet Take 1 tablet by mouth daily         No Known Allergies    Visual Exam:  GEN: NAD  Psych: normal mood, normal affect     Labs/Studies:  - We reviewed the results of the overnight study as described on the HPI.       Patient verbalized understanding of these issues, agrees with the plan and all questions were answered today. Patient was given an opportuntity to voice any other symptoms or concerns not listed above. Patient did not have any other symptoms or concerns.      Abhijit Horvath DO  Board Certified in Internal Medicine and Sleep Medicine      (Note created with Dragon voice recognition and unintended spelling errors and word substitutions may occur)

## 2024-07-18 NOTE — PATIENT INSTRUCTIONS
"SLEEP HYGIENE    Sleep only as much as you need to feel rested and then get out of bed   Keep a regular sleep schedule   Avoid forcing sleep   Exercise regularly for at least 20 minutes, preferably 4 to 5 hours before bedtime   Avoid caffeinated beverages after lunch   Avoid alcohol near bedtime: no \"night cap\"   Avoid smoking, especially in the evening   Do not go to bed hungry   Adjust bedroom environment   Avoid prolonged use of light-emitting screens before bedtime    Deal with your worries before bedtime     Stimulus control    Stimulus control therapy is based on the idea that some people with poor sleep habits have learned to associate the bed with staying awake rather than sleeping.  ?You should spend no more than 15 minutes lying in bed trying to fall asleep.  ?If you cannot fall asleep within 15 minutes, bring a chair and put it next to your bed and sit in that chair. Try to close your eyes in the dark and focus on your breathing. Activities such as eating, balancing your checkbook, doing housework, or studying for a test, which \"reward\" you for staying awake, should be avoided.  ?When you start to feel sleepy, you can return to bed. If you cannot fall asleep in another 20 minutes, repeat the process.  .        "

## 2024-10-10 ENCOUNTER — TELEPHONE (OUTPATIENT)
Dept: FAMILY MEDICINE | Facility: CLINIC | Age: 64
End: 2024-10-10

## 2024-10-10 ENCOUNTER — IMMUNIZATION (OUTPATIENT)
Dept: FAMILY MEDICINE | Facility: CLINIC | Age: 64
End: 2024-10-10
Payer: MEDICARE

## 2024-10-10 DIAGNOSIS — Z23 NEED FOR PROPHYLACTIC VACCINATION AND INOCULATION AGAINST INFLUENZA: Primary | ICD-10-CM

## 2024-10-10 PROCEDURE — 90662 IIV NO PRSV INCREASED AG IM: CPT

## 2024-10-10 PROCEDURE — G0008 ADMIN INFLUENZA VIRUS VAC: HCPCS

## 2024-10-10 NOTE — TELEPHONE ENCOUNTER
Patient had a questions for PCP while receiving her Flu Shot on the ancillary schedule.     Pt wanting to know if she should get a 3rd Shingrix vaccine? Pt had completed series in 2019 but had heard rumors of people getting a 3rd vaccine, she wants to know if this is recommended for her.     Jeanna Alba MA on 10/10/2024 at 9:35 AM

## 2024-10-11 NOTE — CONFIDENTIAL NOTE
She has had 2 of the updated shingles shots, Shingrix.  As of now, no additional shingles shots are needed.  This may change in the future.  She does not need a third shingles shot at this time.  Please update patient.

## 2024-10-14 NOTE — TELEPHONE ENCOUNTER
Mayelin Briggs MD [id number]/11/2024     Edit     Delete     Copy        She has had 2 of the updated shingles shots, Shingrix.  As of now, no additional shingles shots are needed.  This may change in the future.  She does not need a third shingles shot at this time.  Please update patient.

## 2024-10-14 NOTE — TELEPHONE ENCOUNTER
Attempted to call patient at home/mobile number, no answer, left message on voicemail; patient was instructed to return call to [facility] at [phone].    Vera VALDEZ RN  [facility] Triage

## 2024-10-15 NOTE — TELEPHONE ENCOUNTER
Called [phone] (home)   Did they answer the phone: No, left a message on voicemail to return call to the [facility] at [phone], and to ask for any available triage nurse.  (RN did not leave specific details on voicemail for confidential reasons)    Luzma CARRERA RN  Triage Nurse  [facility]

## 2024-10-15 NOTE — TELEPHONE ENCOUNTER
Patient calling back, I relayed provider's message regarding the shingles vaccine question.    Patient verbalized understanding of and agreement with plan.    Vera VALDEZ RN  [facility] Triage

## 2025-02-06 ENCOUNTER — PATIENT OUTREACH (OUTPATIENT)
Dept: CARE COORDINATION | Facility: CLINIC | Age: 65
End: 2025-02-06
Payer: MEDICARE

## 2025-02-20 ENCOUNTER — PATIENT OUTREACH (OUTPATIENT)
Dept: CARE COORDINATION | Facility: CLINIC | Age: 65
End: 2025-02-20
Payer: MEDICARE

## 2025-04-12 ENCOUNTER — HEALTH MAINTENANCE LETTER (OUTPATIENT)
Age: 65
End: 2025-04-12

## 2025-04-22 ENCOUNTER — TELEPHONE (OUTPATIENT)
Dept: DERMATOLOGY | Facility: CLINIC | Age: 65
End: 2025-04-22

## 2025-04-22 ENCOUNTER — OFFICE VISIT (OUTPATIENT)
Dept: DERMATOLOGY | Facility: CLINIC | Age: 65
End: 2025-04-22
Payer: MEDICARE

## 2025-04-22 DIAGNOSIS — C44.311 BASAL CELL CARCINOMA (BCC) OF RIGHT SIDE OF NOSE: Primary | ICD-10-CM

## 2025-04-22 DIAGNOSIS — Z85.828 HISTORY OF SKIN CANCER: ICD-10-CM

## 2025-04-22 DIAGNOSIS — L82.1 SEBORRHEIC KERATOSIS: ICD-10-CM

## 2025-04-22 DIAGNOSIS — D48.5 NEOPLASM OF UNCERTAIN BEHAVIOR OF SKIN: ICD-10-CM

## 2025-04-22 DIAGNOSIS — D22.9 MULTIPLE BENIGN NEVI: ICD-10-CM

## 2025-04-22 DIAGNOSIS — L81.4 LENTIGO: ICD-10-CM

## 2025-04-22 DIAGNOSIS — D18.01 ANGIOMA OF SKIN: Primary | ICD-10-CM

## 2025-04-22 PROCEDURE — 11102 TANGNTL BX SKIN SINGLE LES: CPT | Performed by: PHYSICIAN ASSISTANT

## 2025-04-22 PROCEDURE — 1126F AMNT PAIN NOTED NONE PRSNT: CPT | Performed by: PHYSICIAN ASSISTANT

## 2025-04-22 PROCEDURE — 99213 OFFICE O/P EST LOW 20 MIN: CPT | Mod: 25 | Performed by: PHYSICIAN ASSISTANT

## 2025-04-22 PROCEDURE — 88331 PATH CONSLTJ SURG 1 BLK 1SPC: CPT | Performed by: DERMATOLOGY

## 2025-04-22 ASSESSMENT — PAIN SCALES - GENERAL: PAINLEVEL_OUTOF10: NO PAIN (0)

## 2025-04-22 NOTE — TELEPHONE ENCOUNTER
Spoke with patient, results given and mohs scheduled. Letter sent via [employer].    Ayala Valencia MA

## 2025-04-22 NOTE — PATIENT INSTRUCTIONS
Wound Care Instructions     FOR SUPERFICIAL WOUNDS     [facility] [phone]      AFTER 24 HOURS YOU SHOULD REMOVE THE BANDAGE AND BEGIN DAILY DRESSING CHANGES AS FOLLOWS:     1) Remove Dressing.     2) Clean and dry the area with tap water using a Q-tip or sterile gauze pad.     3) Apply Vaseline, Aquaphor, Polysporin ointment or Bacitracin ointment over entire wound.  Do NOT use Neosporin ointment.     4) Cover the wound with a band-aid, or a sterile non-stick gauze pad and micropore paper tape      REPEAT THESE INSTRUCTIONS AT LEAST ONCE A DAY UNTIL THE WOUND HAS COMPLETELY HEALED.    It is an old wives tale that a wound heals better when it is exposed to air and allowed to dry out. The wound will heal faster with a better cosmetic result if it is kept moist with ointment and covered with a bandage.    **Do not let the wound dry out.**      Supplies Needed:      *Cotton tipped applicators (Q-tips)    *Polysporin Ointment or Bacitracin Ointment (NOT NEOSPORIN)    *Band-aids or non-stick gauze pads and micropore paper tape.      PATIENT INFORMATION:    During the healing process you will notice a number of changes. All wounds develop a small halo of redness surrounding the wound.  This means healing is occurring. Severe itching with extensive redness usually indicates sensitivity to the ointment or bandage tape used to dress the wound.  You should call our office if this develops.      Swelling  and/or discoloration around your surgical site is common, particularly when performed around the eye.    All wounds normally drain.  The larger the wound the more drainage there will be.  After 7-10 days, you will notice the wound beginning to shrink and new skin will begin to grow.  The wound is healed when you can see skin has formed over the entire area.  A healed wound has a healthy, shiny look to the surface and is red to dark pink in color to normalize.  Wounds may  take approximately 4-6 weeks to heal.  Larger wounds may take 6-8 weeks.  After the wound is healed you may discontinue dressing changes.    You may experience a sensation of tightness as your wound heals. This is normal and will gradually subside.    Your healed wound may be sensitive to temperature changes. This sensitivity improves with time, but if you re having a lot of discomfort, try to avoid temperature extremes.    Patients frequently experience itching after their wound appears to have healed because of the continue healing under the skin.  Plain Vaseline will help relieve the itching.    POSSIBLE COMPLICATIONS    BLEEDING:    Leave the bandage in place.  Use tightly rolled up gauze or a cloth to apply direct pressure over the bandage for 30  minutes.  Reapply pressure for an additional 30 minutes if necessary  Use additional gauze and tape to maintain pressure once the bleeding has stopped.     Proper skin care from [facility]:    -Eliminate harsh soaps as they strip the natural oils from the skin, often resulting in dry itchy skin ( i.e. Dial, Zest, Rebecca Spring)  -Use mild soaps such as Cetaphil or Dove Sensitive Skin in the shower. You do not need to use soap on arms, legs, and trunk every time you shower unless visibly soiled.   -Avoid hot or cold showers.  -After showering, lightly dry off and apply moisturizing within 2-3 minutes. This will help trap moisture in the skin.   -Aggressive use of a moisturizer at least 1-2 times a day to the entire body (including -Vanicream, Cetaphil, Aquaphor or Cerave) and moisturize hands after every washing.  -We recommend using moisturizers that come in a tub that needs to be scooped out, not a pump. This has more of an oil base. It will hold moisture in your skin much better than a water base moisturizer. The above recommended are non-pore clogging.      Wear a sunscreen with at least SPF 30 on your face, ears, neck and V of the chest daily. Wear  sunscreen on other areas of the body if those areas are exposed to the sun throughout the day. Sunscreens can contain physical and/or chemical blockers. Physical blockers are less likely to clog pores, these include zinc oxide and titanium dioxide. Reapply every two hour and after swimming.     Sunscreen examples: https://www.ewg.org/sunscreen/    UV radiation  UVA radiation remains constant throughout the day and throughout the year. It is a longer wavelength than UVB and therefore penetrates deeper into the skin leading to immediate and delayed tanning, photoaging, and skin cancer. 70-80% of UVA and UVB radiation occurs between the hours of 10am-2pm.  UVB radiation  UVB radiation causes the most harmful effects and is more significant during the summer months. However, snow and ice can reflect UVB radiation leading to skin damage during the winter months as well. UVB radiation is responsible for tanning, burning, inflammation, delayed erythema (pinkness), pigmentation (brown spots), and skin cancer.     I recommend self monthly full body exams and yearly full body exams with a dermatology provider. If you develop a new or changing lesion please follow up for examination. Most skin cancers are pink and scaly or pink and pearly. However, we do see blue/brown/black skin cancers.  Consider the ABCDEs of melanoma when giving yourself your monthly full body exam ( don't forget the groin, buttocks, feet, toes, etc). A-asymmetry, B-borders, C-color, D-diameter, E-elevation or evolving. If you see any of these changes please follow up in clinic. If you cannot see your back I recommend purchasing a hand held mirror to use with a larger wall mirror.       Checking for Skin Cancer  You can find cancer early by checking your skin each month. There are 3 kinds of skin cancer. They are melanoma, basal cell carcinoma, and squamous cell carcinoma. Doing monthly skin checks is the best way to find new marks or skin changes. Follow  the instructions below for checking your skin.   The ABCDEs of checking moles for melanoma   Check your moles or growths for signs of melanoma using ABCDE:   Asymmetry: the sides of the mole or growth don t match  Border: the edges are ragged, notched, or blurred  Color: the color within the mole or growth varies  Diameter: the mole or growth is larger than 6 mm (size of a pencil eraser)  Evolving: the size, shape, or color of the mole or growth is changing (evolving is not shown in the images below)    Checking for other types of skin cancer  Basal cell carcinoma or squamous cell carcinoma have symptoms such as:     A spot or mole that looks different from all other marks on your skin  Changes in how an area feels, such as itching, tenderness, or pain  Changes in the skin's surface, such as oozing, bleeding, or scaliness  A sore that does not heal  New swelling or redness beyond the border of a mole    Who s at risk?  Anyone can get skin cancer. But you are at greater risk if you have:   Fair skin, light-colored hair, or light-colored eyes  Many moles or abnormal moles on your skin  A history of sunburns from sunlight or tanning beds  A family history of skin cancer  A history of exposure to radiation or chemicals  A weakened immune system  If you have had skin cancer in the past, you are at risk for recurring skin cancer.   How to check your skin  Do your monthly skin checkups in front of a full-length mirror. Check all parts of your body, including your:   Head (ears, face, neck, and scalp)  Torso (front, back, and sides)  Arms (tops, undersides, upper, and lower armpits)  Hands (palms, backs, and fingers, including under the nails)  Buttocks and genitals  Legs (front, back, and sides)  Feet (tops, soles, toes, including under the nails, and between toes)  If you have a lot of moles, take digital photos of them each month. Make sure to take photos both up close and from a distance. These can help you see if any  moles change over time.   Most skin changes are not cancer. But if you see any changes in your skin, call your doctor right away. Only he or she can diagnose a problem. If you have skin cancer, seeing your doctor can be the first step toward getting the treatment that could save your life.   Ann last reviewed this educational content on 4/1/2019 2000-2020 [employer]. [address] [place] [address], [place] [address]. All rights reserved. This information is not intended as a substitute for professional medical care. Always follow your healthcare professional's instructions.       When should I call my doctor?  If you are worsening or not improving, please, contact us or seek urgent care as noted below.     Who should I call with questions (adults)?    [facility] [phone]  For urgent needs outside of business hours call the [facility] at [phone] and ask for the dermatology resident on call to be paged  If this is a medical emergency and you are unable to reach an ER, Call [phone]      If you need a prescription refill, please contact your pharmacy. Refills are approved or denied by our Physicians during normal business hours, Monday through Friday.  Per office policy, refills will not be granted if you have not been seen within the past year (or sooner depending on the condition).

## 2025-04-22 NOTE — TELEPHONE ENCOUNTER
----- Message from Ray Og sent at 4/22/2025 10:58 AM CDT -----  [facility] basal cell carcinoma schedule excision

## 2025-04-22 NOTE — LETTER
4/22/2025      Carlos Mayo  [address] [id number]      Dear Colleague,    Thank you for referring your patient, Carlos Mayo, to the [facility]. Please see a copy of my visit note below.    Carlos Mayo is a pleasant 64 year old year old female patient here today for hx of non-melanoma skin cancer.  She notes spot on nose, present for months. Not healing. She does note a few dry areas on back and in left nostril. Patient has no other skin complaints today.  Remainder of the HPI, Meds, PMH, Allergies, FH, and SH was reviewed in chart.    PMH: history of BCC  Past Medical History:   Diagnosis Date     Basal cell carcinoma      Esophageal reflux     Resolved     Palpitations     Holter monitor cleared per patient.      Unspecified acute reaction to stress        Past Surgical History:   Procedure Laterality Date     COLONOSCOPY  09/14/2009    Procedure: Colonoscopy Providers: Grey Paulino MD, Isis Dorantes RN     COLONOSCOPY N/A 4/8/2024    Procedure: COLONOSCOPY, FLEXIBLE, WITH LESION REMOVAL USING SNARE;  Surgeon: Fly Cabral MD;  Location: [facility]     ESOPHAGOSCOPY, GASTROSCOPY, DUODENOSCOPY (EGD), COMBINED N/A 4/8/2024    Procedure: ESOPHAGOGASTRODUODENOSCOPY, WITH BIOPSY;  Surgeon: Fly Cabral MD;  Location: [facility]     OPEN REDUCTION INTERNAL FIXATION WRIST Right 9/27/2016    Procedure: OPEN REDUCTION INTERNAL FIXATION WRIST;  Surgeon: Sammie Peterson MD;  Location: [facility]     REMOVE HARDWARE WRIST Right 9/14/2017    Procedure: REMOVE HARDWARE WRIST;  Right distal radius removal of hardware;  Surgeon: Sammie Peterson MD;  Location: [facility] OR     SURGICAL HISTORY OF -   1980    Extraction of [place] teeth        Family History   Problem Relation Age of Onset     Hypertension Mother      Depression/Anxiety Mother      Hypertension Father      Snoring Father      Cerebrovascular Disease Maternal Grandfather      Anesthesia Reaction No family hx of         Social History     Socioeconomic History     Marital status:      Spouse name: Not on file     Number of children: Not on file     Years of education: Not on file     Highest education level: Not on file   Occupational History     Not on file   Tobacco Use     Smoking status: Never     Passive exposure: Never     Smokeless tobacco: Never   Vaping Use     Vaping status: Never Used   Substance and Sexual Activity     Alcohol use: Yes     Alcohol/week: 1.7 standard drinks of alcohol     Comment: 2-3 times a month     Drug use: No     Sexual activity: Not Currently     Partners: Male     Birth control/protection: None     Comment: spouse vas   Other Topics Concern     Parent/sibling w/ CABG, MI or angioplasty before 65F 55M? No   Social History Narrative     Not on file     Social Drivers of Health     Financial Resource Strain: Low Risk  (3/5/2024)    Financial Resource Strain      Within the past 12 months, have you or your family members you live with been unable to get utilities (heat, electricity) when it was really needed?: No   Food Insecurity: Low Risk  (3/5/2024)    Food Insecurity      Within the past 12 months, did you worry that your food would run out before you got money to buy more?: No      Within the past 12 months, did the food you bought just not last and you didn t have money to get more?: No   Transportation Needs: Low Risk  (3/5/2024)    Transportation Needs      Within the past 12 months, has lack of transportation kept you from medical appointments, getting your medicines, non-medical meetings or appointments, work, or from getting things that you need?: No   Physical Activity: Sufficiently Active (3/5/2024)    Exercise Vital Sign      Days of Exercise per Week: 4 days      Minutes of Exercise per Session: 50 min   Stress: No Stress Concern Present (3/5/2024)    [employer] [place] [employer] - Occupational Stress Questionnaire      Feeling of Stress : Only a little    Social Connections: Unknown (3/5/2024)    Social Connection and Isolation Panel [NHANES]      Frequency of Communication with Friends and Family: Not on file      Frequency of Social Gatherings with Friends and Family: Once a week      Attends Sikhism Services: Not on file      Active Member of Clubs or Organizations: Not on file      Attends Club or Organization Meetings: Not on file      Marital Status: Not on file   Interpersonal Safety: Low Risk  (3/8/2024)    Interpersonal Safety      Do you feel physically and emotionally safe where you currently live?: Yes      Within the past 12 months, have you been hit, slapped, kicked or otherwise physically hurt by someone?: No      Within the past 12 months, have you been humiliated or emotionally abused in other ways by your partner or ex-partner?: No   Housing Stability: High Risk (3/5/2024)    Housing Stability      Do you have housing? : No      Are you worried about losing your housing?: No       Outpatient Encounter Medications as of 4/22/2025   Medication Sig Dispense Refill     albuterol (PROAIR HFA/PROVENTIL HFA/VENTOLIN HFA) 108 (90 Base) MCG/ACT inhaler Albuterol inhaler 2 puffs to be used 5 to 10 minutes before exercise or every 4-6 hours as needed for wheezing or shortness of breath (Patient not taking: Reported on 5/3/2024) 18 g 0     Ascorbic Acid (VITAMIN C PO) Take 500 mg by mouth daily       Cholecalciferol (VITAMIN D) 2000 UNITS CAPS Take 1 chew tab by mouth daily       fluocinonide (LIDEX) 0.05 % external cream Apply topically 2 times daily (Patient not taking: Reported on 12/8/2023) 120 g 6     vitamin B complex with vitamin C (STRESS TAB) tablet Take 1 tablet by mouth daily       No facility-administered encounter medications on file as of 4/22/2025.             O:   NAD, WDWN, Alert & Oriented, Mood & Affect wnl, Vitals stable   Here today alone   Vitals stable   Alert, oriented and in no acute distress     0.5 cm pink thin papule on right  nasal sidewall   Stuck on papules and brown macules on trunk and ext   Red papules on trunk  Brown papules and macules with regular pigment network and borders on torso and extremities     The remainder of skin exam is normal     Eyes: Conjunctivae/lids:Normal     ENT: Lips normal    MSK:Normal    Cardiovascular: peripheral edema none    Pulm: Breathing Normal    Neuro/Psych: Orientation:Alert and Orientedx3 ; Mood/Affect:normal       A/P:  1. R/O BCC on right nasal sidewall  TANGENTIAL BIOPSY IN HOUSE:  After consent, anesthesia with LEC and prep, tangential excision performed.  No complications and routine wound care.     2. Seborrheic keratosis, lentigo, angioma, dermal nevus, hx of non-melanoma skin cancer   It was a pleasure speaking to Carlos Mayo today.  Previous clinic notes and pertinent laboratory tests were reviewed prior to Carlos Mayo's visit.  Nature of benign skin lesions dicussed with patient.  Signs and Symptoms of skin cancer discussed with patient.  Patient encouraged to perform monthly skin exams.  UV precautions reviewed with patient.  Risks of non-melanoma skin cancer discussed with patient   Return to clinic pending biopsy results.       Again, thank you for allowing me to participate in the care of your patient.        Sincerely,        Jolanta Kirby PA-C    Electronically signed

## 2025-04-22 NOTE — TELEPHONE ENCOUNTER
[facility] Call Center    Phone Message    May a detailed message be left on voicemail: yes     Reason for Call: Other: Irma returned call she is ok with call back at any time     Action Taken: Other: TE to [facility]    Travel Screening: Not Applicable     Date of Service:                                                                      05:15

## 2025-04-22 NOTE — NURSING NOTE
Chief Complaint   Patient presents with    Skin Check     Spot on right medial cheek        There were no vitals filed for this visit.  Wt Readings from Last 1 Encounters:   07/18/24 63.5 kg (140 lb)       Jennifer Johnson LPN .................4/22/2025

## 2025-04-22 NOTE — LETTER
4/22/2025      Carlos Mayo  [address] [place] [id number]      Dear Colleague,    Thank you for referring your patient, Carlos Mayo, to the [facility]. Please see a copy of my visit note below.    R NSW:Orthokeratosis of epidermis with a proliferation of nests of basaloid cells, with peripheral palisading and a haphazard arrangement in the center extending into the dermis, forming nodules.  The tumor cells have hyperchromatic nuclei. Poor cytoplasm and intercellular bridging.      R NSW basal cell carcinoma schedule excision       Again, thank you for allowing me to participate in the care of your patient.        Sincerely,        Ray Og MD    Electronically signed

## 2025-04-22 NOTE — PROGRESS NOTES
R NSW:Orthokeratosis of epidermis with a proliferation of nests of basaloid cells, with peripheral palisading and a haphazard arrangement in the center extending into the dermis, forming nodules.  The tumor cells have hyperchromatic nuclei. Poor cytoplasm and intercellular bridging.      R NSW basal cell carcinoma schedule excision

## 2025-04-22 NOTE — TELEPHONE ENCOUNTER
Patient Contact    Attempt # 1    Was call answered?  No    Message left to return call for pathology results. Clinic number provided.    Ayala Valencia MA    [facility]   [phone]

## 2025-05-05 ENCOUNTER — OFFICE VISIT (OUTPATIENT)
Dept: DERMATOLOGY | Facility: CLINIC | Age: 65
End: 2025-05-05
Payer: MEDICARE

## 2025-05-05 DIAGNOSIS — C44.311 BASAL CELL CARCINOMA (BCC) OF RIGHT SIDE OF NOSE: ICD-10-CM

## 2025-05-05 PROCEDURE — 17311 MOHS 1 STAGE H/N/HF/G: CPT | Performed by: DERMATOLOGY

## 2025-05-05 PROCEDURE — 17312 MOHS ADDL STAGE: CPT | Performed by: DERMATOLOGY

## 2025-05-05 PROCEDURE — 13152 CMPLX RPR E/N/E/L 2.6-7.5 CM: CPT | Performed by: DERMATOLOGY

## 2025-05-05 NOTE — PATIENT INSTRUCTIONS
Sutured Wound Care  [facility] [phone]              No strenuous activity for 48 hours. Resume moderate activity in 48 hours. No heavy exercising until you are seen for follow up in one week.     Take Tylenol as needed for discomfort.                         Do not drink alcoholic beverages for 48 hours.     Keep the pressure bandage in place for 24 hours. If the bandage becomes blood tinged or loose, reinforce it with gauze and tape.        (Refer to the reverse side of this page for management of bleeding).    Remove pressure bandage in 24 hours     Leave the flat bandage in place until your follow up appointment.    Keep the bandage dry. Wash around it carefully.    If the tape becomes soiled or starts to come off, reinforce it with additional paper tape.    Do not smoke for 3 weeks; smoking is detrimental to wound healing.    It is normal to have swelling and bruising around the surgical site. The bruising will fade in approximately 10-14 days. Elevate the area to reduce swelling.    Numbness, itchiness and sensitivity to temperature changes can occur after surgery and may take up to 18 months to normalize.      POSSIBLE COMPLICATIONS    BLEEDING:    Leave the bandage in place.  Use tightly rolled up gauze or a cloth to apply direct pressure over the bandage for 20   minutes.  Reapply pressure for an additional 20 minutes if necessary  Call the office or go to the nearest emergency room if pressure fails to stop the bleeding.  Use additional gauze and tape to maintain pressure once the bleeding has stopped.        PAIN:    Post operative pain should slowly get better, never worse.  A severe increase in pain may indicate a problem. Call the office if this occurs.    In case of emergency phone:Dr Og [phone]               Proper skin care from [facility]:    -Eliminate harsh soaps as they strip the natural oils from the skin, often  resulting in dry itchy skin ( i.e. Dial, Zest, Martiniquais Spring)  -Use mild soaps such as Cetaphil or Dove Sensitive Skin in the shower. You do not need to use soap on arms, legs, and trunk every time you shower unless visibly soiled.   -Avoid hot or cold showers.  -After showering, lightly dry off and apply moisturizing within 2-3 minutes. This will help trap moisture in the skin.   -Aggressive use of a moisturizer at least 1-2 times a day to the entire body (including -Vanicream, Cetaphil, Aquaphor or Cerave) and moisturize hands after every washing.  -We recommend using moisturizers that come in a tub that needs to be scooped out, not a pump. This has more of an oil base. It will hold moisture in your skin much better than a water base moisturizer. The above recommended are non-pore clogging.      Wear a sunscreen with at least SPF 30 on your face, ears, neck and V of the chest daily. Wear sunscreen on other areas of the body if those areas are exposed to the sun throughout the day. Sunscreens can contain physical and/or chemical blockers. Physical blockers are less likely to clog pores, these include zinc oxide and titanium dioxide. Reapply every two hour and after swimming.     Sunscreen examples: https://www.ewg.org/sunscreen/    UV radiation  UVA radiation remains constant throughout the day and throughout the year. It is a longer wavelength than UVB and therefore penetrates deeper into the skin leading to immediate and delayed tanning, photoaging, and skin cancer. 70-80% of UVA and UVB radiation occurs between the hours of 10am-2pm.  UVB radiation  UVB radiation causes the most harmful effects and is more significant during the summer months. However, snow and ice can reflect UVB radiation leading to skin damage during the winter months as well. UVB radiation is responsible for tanning, burning, inflammation, delayed erythema (pinkness), pigmentation (brown spots), and skin cancer.     I recommend self monthly  full body exams and yearly full body exams with a dermatology provider. If you develop a new or changing lesion please follow up for examination. Most skin cancers are pink and scaly or pink and pearly. However, we do see blue/brown/black skin cancers.  Consider the ABCDEs of melanoma when giving yourself your monthly full body exam ( don't forget the groin, buttocks, feet, toes, etc). A-asymmetry, B-borders, C-color, D-diameter, E-elevation or evolving. If you see any of these changes please follow up in clinic. If you cannot see your back I recommend purchasing a hand held mirror to use with a larger wall mirror.       Checking for Skin Cancer  You can find cancer early by checking your skin each month. There are 3 kinds of skin cancer. They are melanoma, basal cell carcinoma, and squamous cell carcinoma. Doing monthly skin checks is the best way to find new marks or skin changes. Follow the instructions below for checking your skin.   The ABCDEs of checking moles for melanoma   Check your moles or growths for signs of melanoma using ABCDE:   Asymmetry: the sides of the mole or growth don t match  Border: the edges are ragged, notched, or blurred  Color: the color within the mole or growth varies  Diameter: the mole or growth is larger than 6 mm (size of a pencil eraser)  Evolving: the size, shape, or color of the mole or growth is changing (evolving is not shown in the images below)    Checking for other types of skin cancer  Basal cell carcinoma or squamous cell carcinoma have symptoms such as:     A spot or mole that looks different from all other marks on your skin  Changes in how an area feels, such as itching, tenderness, or pain  Changes in the skin's surface, such as oozing, bleeding, or scaliness  A sore that does not heal  New swelling or redness beyond the border of a mole    Who s at risk?  Anyone can get skin cancer. But you are at greater risk if you have:   Fair skin, light-colored hair, or  light-colored eyes  Many moles or abnormal moles on your skin  A history of sunburns from sunlight or tanning beds  A family history of skin cancer  A history of exposure to radiation or chemicals  A weakened immune system  If you have had skin cancer in the past, you are at risk for recurring skin cancer.   How to check your skin  Do your monthly skin checkups in front of a full-length mirror. Check all parts of your body, including your:   Head (ears, face, neck, and scalp)  Torso (front, back, and sides)  Arms (tops, undersides, upper, and lower armpits)  Hands (palms, backs, and fingers, including under the nails)  Buttocks and genitals  Legs (front, back, and sides)  Feet (tops, soles, toes, including under the nails, and between toes)  If you have a lot of moles, take digital photos of them each month. Make sure to take photos both up close and from a distance. These can help you see if any moles change over time.   Most skin changes are not cancer. But if you see any changes in your skin, call your doctor right away. Only he or she can diagnose a problem. If you have skin cancer, seeing your doctor can be the first step toward getting the treatment that could save your life.   [employer] last reviewed this educational content on 4/1/2019 2000-2020 [employer] [address] [place] [address]. All rights reserved. This information is not intended as a substitute for professional medical care. Always follow your healthcare professional's instructions.       When should I call my doctor?  If you are worsening or not improving, please, contact us or seek urgent care as noted below.     Who should I call with questions (adults)?    [facility] [phone]  For urgent needs outside of business hours call the [facility] at [phone] and ask for the dermatology resident on call to be paged  If this is a medical emergency and you are unable to reach an  ER, Call 911      If you need a prescription refill, please contact your pharmacy. Refills are approved or denied by our Physicians during normal business hours, Monday through Friday.  Per office policy, refills will not be granted if you have not been seen within the past year (or sooner depending on the condition).

## 2025-05-05 NOTE — PROGRESS NOTES
Carlos Mayo is an extremely pleasant 64 year old year old female patient here today for evaluation and managment of basal cell carcinoma on right nsw.  Patient has no other skin complaints today.  Remainder of the HPI, Meds, PMH, Allergies, FH, and SH was reviewed in chart.      Past Medical History:   Diagnosis Date    Basal cell carcinoma     Esophageal reflux     Resolved    Palpitations     Holter monitor cleared per patient.     Unspecified acute reaction to stress        Past Surgical History:   Procedure Laterality Date    COLONOSCOPY  09/14/2009    Procedure: Colonoscopy Providers: Grey Paulino MD, Isis Dorantes RN    COLONOSCOPY N/A 4/8/2024    Procedure: COLONOSCOPY, FLEXIBLE, WITH LESION REMOVAL USING SNARE;  Surgeon: Fly Cabral MD;  Location: [facility]    ESOPHAGOSCOPY, GASTROSCOPY, DUODENOSCOPY (EGD), COMBINED N/A 4/8/2024    Procedure: ESOPHAGOGASTRODUODENOSCOPY, WITH BIOPSY;  Surgeon: Fly Cabral MD;  Location: [facility]    OPEN REDUCTION INTERNAL FIXATION WRIST Right 9/27/2016    Procedure: OPEN REDUCTION INTERNAL FIXATION WRIST;  Surgeon: Sammie Peterson MD;  Location: [facility]    REMOVE HARDWARE WRIST Right 9/14/2017    Procedure: REMOVE HARDWARE WRIST;  Right distal radius removal of hardware;  Surgeon: Sammie Peterson MD;  Location: [facility]    SURGICAL HISTORY OF -   1980    Extraction of [place] teeth        Family History   Problem Relation Age of Onset    Hypertension Mother     Depression/Anxiety Mother     Hypertension Father     Snoring Father     Cerebrovascular Disease Maternal Grandfather     Anesthesia Reaction No family hx of        Social History     Socioeconomic History    Marital status:      Spouse name: Not on file    Number of children: Not on file    Years of education: Not on file    Highest education level: Not on file   Occupational History    Not on file   Tobacco Use    Smoking status: Never     Passive exposure: Never    Smokeless tobacco: Never    Vaping Use    Vaping status: Never Used   Substance and Sexual Activity    Alcohol use: Yes     Alcohol/week: 1.7 standard drinks of alcohol     Comment: 2-3 times a month    Drug use: No    Sexual activity: Not Currently     Partners: Male     Birth control/protection: None     Comment: spouse vas   Other Topics Concern    Parent/sibling w/ CABG, MI or angioplasty before 65F 55M? No   Social History Narrative    Not on file     Social Drivers of Health     Financial Resource Strain: Low Risk  (3/5/2024)    Financial Resource Strain     Within the past 12 months, have you or your family members you live with been unable to get utilities (heat, electricity) when it was really needed?: No   Food Insecurity: Low Risk  (3/5/2024)    Food Insecurity     Within the past 12 months, did you worry that your food would run out before you got money to buy more?: No     Within the past 12 months, did the food you bought just not last and you didn t have money to get more?: No   Transportation Needs: Low Risk  (3/5/2024)    Transportation Needs     Within the past 12 months, has lack of transportation kept you from medical appointments, getting your medicines, non-medical meetings or appointments, work, or from getting things that you need?: No   Physical Activity: Sufficiently Active (3/5/2024)    Exercise Vital Sign     Days of Exercise per Week: 4 days     Minutes of Exercise per Session: 50 min   Stress: No Stress Concern Present (3/5/2024)    [employer] - Occupational Stress Questionnaire     Feeling of Stress : Only a little   Social Connections: Unknown (3/5/2024)    Social Connection and Isolation Panel [NHANES]     Frequency of Communication with Friends and Family: Not on file     Frequency of Social Gatherings with Friends and Family: Once a week     Attends Spiritism Services: Not on file     Active Member of Clubs or Organizations: Not on file     Attends Club or Organization  Meetings: Not on file     Marital Status: Not on file   Interpersonal Safety: Low Risk  (3/8/2024)    Interpersonal Safety     Do you feel physically and emotionally safe where you currently live?: Yes     Within the past 12 months, have you been hit, slapped, kicked or otherwise physically hurt by someone?: No     Within the past 12 months, have you been humiliated or emotionally abused in other ways by your partner or ex-partner?: No   Housing Stability: High Risk (3/5/2024)    Housing Stability     Do you have housing? : No     Are you worried about losing your housing?: No       Outpatient Encounter Medications as of 5/5/2025   Medication Sig Dispense Refill    albuterol (PROAIR HFA/PROVENTIL HFA/VENTOLIN HFA) 108 (90 Base) MCG/ACT inhaler Albuterol inhaler 2 puffs to be used 5 to 10 minutes before exercise or every 4-6 hours as needed for wheezing or shortness of breath (Patient not taking: Reported on 5/3/2024) 18 g 0    Ascorbic Acid (VITAMIN C PO) Take 500 mg by mouth daily      Cholecalciferol (VITAMIN D) 2000 UNITS CAPS Take 1 chew tab by mouth daily      fluocinonide (LIDEX) 0.05 % external cream Apply topically 2 times daily (Patient not taking: Reported on 12/8/2023) 120 g 6    vitamin B complex with vitamin C (STRESS TAB) tablet Take 1 tablet by mouth daily       No facility-administered encounter medications on file as of 5/5/2025.             O:   NAD, WDWN, Alert & Oriented, Mood & Affect wnl, Vitals stable   General appearance normal   Vitals stable   Alert, oriented and in no acute distress   R NSW 5mm scaly papule       Eyes: Conjunctivae/lids:Normal     ENT: Lips, mucosa: normal    MSK:Normal    Cardiovascular: peripheral edema none    Pulm: Breathing Normal    Neuro/Psych: Orientation:Alert and Orientedx3 ; Mood/Affect:normal       A/P:  R NSW basal cell carcinoma   MOHS:   Location    The rationale for Mohs surgery was discussed with the patient and consent was obtained.  The risks and  benefits as well as alternatives to therapy were discussed, in detail.  Specifically, the risks of infection, scarring, bleeding, prolonged wound healing, incomplete removal, allergy to anesthesia, nerve injury and recurrence were addressed.  Indication for Mohs was Location. Prior to the procedure, the treatment site was clearly identified and, if available, confirmed with previous photos and confirmed by the patient   All components of the Universal Protocol/PAUSE rule were completed.  The Mohs surgeon operated in two distinct and integrated capacities as the surgeon and pathologist.      The area was prepped with Betasept.  A rim of normal appearing skin was marked circumferentially around the lesion.  The area was infiltrated with local anesthesia.  The tumor was first debulked to remove all clinically apparent tumor.  An incision following the standard Mohs approach was done and the specimen was oriented,mapped and placed in 2 block(s).  Each specimen was then chromacoded and processed in the Mohs laboratory using standard Mohs technique and submitted for frozen section histology.  Frozen section analysis showed  residual tumor but CLEAR MARGINS.    1st stage:Orthokeratosis of epidermis with a proliferation of nests of basaloid cells, with peripheral palisading and a haphazard arrangement in the center extending into the dermis, forming nodules.  The tumor cells have hyperchromatic nuclei. Poor cytoplasm and intercellular bridging.      The tumor was excised using standard Mohs technique in 2 stages(s).  CLEAR MARGINS OBTAINED and Final defect size was 1.2cm.     We discussed the options for wound management in full with the patient including risks/benefits/ possible outcomes.      REPAIR COMPLEX: Because of the tightness of the surrounding skin and Because of the size and full thickness nature of the defect, Because of the tightness of the surrounding skin, To maintain form and function, In order to avoid  distortion, and Because of the proximity to the nasal tip and ala, a complex closure was planned. After LE anesthesia and prep, Burow's triangles were excised in the relaxed skin tension lines. The wound edges were widely undermined greater than width of the defect on both sides by dissection in the subcutaneous plane until adequate tissue mobility was obtained. Hemostasis was obtained. The wound edges were closed in a layered fashion using Vicryl and Fast Absorbing Plain Gut sutures. Postoperative length was 4 cm.   EBL minimal; complications none; wound care routine.  The patient was discharged in good condition and will return in one week for wound evaluation.  It was a pleasure speaking to Carlos Mayo today.  Previous clinic notes and pertinent laboratory tests were reviewed prior to Carlos Mayo's visit.  Signs and Symptoms of skin cancer discussed with patient.  Patient encouraged to perform monthly skin exams.  UV precautions reviewed with patient.  Return to clinic 6 months

## 2025-05-05 NOTE — LETTER
5/5/2025      Carlos Mayo  [address] [facility] [address]      Dear Colleague,    Thank you for referring your patient, Carlos Mayo, to the [facility]. Please see a copy of my visit note below.    Surgical Office Location :   [facility]  [address] [place] [address]      Carlos Mayo is an extremely pleasant 64 year old year old female patient here today for evaluation and managment of basal cell carcinoma on right nsw.  Patient has no other skin complaints today.  Remainder of the HPI, Meds, PMH, Allergies, FH, and SH was reviewed in chart.      Past Medical History:   Diagnosis Date     Basal cell carcinoma      Esophageal reflux     Resolved     Palpitations     Holter monitor cleared per patient.      Unspecified acute reaction to stress        Past Surgical History:   Procedure Laterality Date     COLONOSCOPY  09/14/2009    Procedure: Colonoscopy Providers: Grey Paulino MD, Isis Dorantes RN     COLONOSCOPY N/A 4/8/2024    Procedure: COLONOSCOPY, FLEXIBLE, WITH LESION REMOVAL USING SNARE;  Surgeon: Fly Cabral MD;  Location: [facility]     ESOPHAGOSCOPY, GASTROSCOPY, DUODENOSCOPY (EGD), COMBINED N/A 4/8/2024    Procedure: ESOPHAGOGASTRODUODENOSCOPY, WITH BIOPSY;  Surgeon: Fly Cabral MD;  Location: [facility]     OPEN REDUCTION INTERNAL FIXATION WRIST Right 9/27/2016    Procedure: OPEN REDUCTION INTERNAL FIXATION WRIST;  Surgeon: Sammie Peterson MD;  Location: [facility]     REMOVE HARDWARE WRIST Right 9/14/2017    Procedure: REMOVE HARDWARE WRIST;  Right distal radius removal of hardware;  Surgeon: Sammie Peterson MD;  Location:  OR     SURGICAL HISTORY OF -   1980    Extraction of [place] teeth        Family History   Problem Relation Age of Onset     Hypertension Mother      Depression/Anxiety Mother      Hypertension Father      Snoring Father      Cerebrovascular Disease Maternal Grandfather      Anesthesia Reaction No  family hx of        Social History     Socioeconomic History     Marital status:      Spouse name: Not on file     Number of children: Not on file     Years of education: Not on file     Highest education level: Not on file   Occupational History     Not on file   Tobacco Use     Smoking status: Never     Passive exposure: Never     Smokeless tobacco: Never   Vaping Use     Vaping status: Never Used   Substance and Sexual Activity     Alcohol use: Yes     Alcohol/week: 1.7 standard drinks of alcohol     Comment: 2-3 times a month     Drug use: No     Sexual activity: Not Currently     Partners: Male     Birth control/protection: None     Comment: spouse vas   Other Topics Concern     Parent/sibling w/ CABG, MI or angioplasty before 65F 55M? No   Social History Narrative     Not on file     Social Drivers of Health     Financial Resource Strain: Low Risk  (3/5/2024)    Financial Resource Strain      Within the past 12 months, have you or your family members you live with been unable to get utilities (heat, electricity) when it was really needed?: No   Food Insecurity: Low Risk  (3/5/2024)    Food Insecurity      Within the past 12 months, did you worry that your food would run out before you got money to buy more?: No      Within the past 12 months, did the food you bought just not last and you didn t have money to get more?: No   Transportation Needs: Low Risk  (3/5/2024)    Transportation Needs      Within the past 12 months, has lack of transportation kept you from medical appointments, getting your medicines, non-medical meetings or appointments, work, or from getting things that you need?: No   Physical Activity: Sufficiently Active (3/5/2024)    Exercise Vital Sign      Days of Exercise per Week: 4 days      Minutes of Exercise per Session: 50 min   Stress: No Stress Concern Present (3/5/2024)    [employer] [place] - Occupational Stress Questionnaire      Feeling of Stress : Only a  09-May-2017 04:48 little   Social Connections: Unknown (3/5/2024)    Social Connection and Isolation Panel [NHANES]      Frequency of Communication with Friends and Family: Not on file      Frequency of Social Gatherings with Friends and Family: Once a week      Attends Buddhism Services: Not on file      Active Member of Clubs or Organizations: Not on file      Attends Club or Organization Meetings: Not on file      Marital Status: Not on file   Interpersonal Safety: Low Risk  (3/8/2024)    Interpersonal Safety      Do you feel physically and emotionally safe where you currently live?: Yes      Within the past 12 months, have you been hit, slapped, kicked or otherwise physically hurt by someone?: No      Within the past 12 months, have you been humiliated or emotionally abused in other ways by your partner or ex-partner?: No   Housing Stability: High Risk (3/5/2024)    Housing Stability      Do you have housing? : No      Are you worried about losing your housing?: No       Outpatient Encounter Medications as of 5/5/2025   Medication Sig Dispense Refill     albuterol (PROAIR HFA/PROVENTIL HFA/VENTOLIN HFA) 108 (90 Base) MCG/ACT inhaler Albuterol inhaler 2 puffs to be used 5 to 10 minutes before exercise or every 4-6 hours as needed for wheezing or shortness of breath (Patient not taking: Reported on 5/3/2024) 18 g 0     Ascorbic Acid (VITAMIN C PO) Take 500 mg by mouth daily       Cholecalciferol (VITAMIN D) 2000 UNITS CAPS Take 1 chew tab by mouth daily       fluocinonide (LIDEX) 0.05 % external cream Apply topically 2 times daily (Patient not taking: Reported on 12/8/2023) 120 g 6     vitamin B complex with vitamin C (STRESS TAB) tablet Take 1 tablet by mouth daily       No facility-administered encounter medications on file as of 5/5/2025.             O:   NAD, WDWN, Alert & Oriented, Mood & Affect wnl, Vitals stable   General appearance normal   Vitals stable   Alert, oriented and in no acute distress   R NSW 5mm scaly papule        Eyes: Conjunctivae/lids:Normal     ENT: Lips, mucosa: normal    MSK:Normal    Cardiovascular: peripheral edema none    Pulm: Breathing Normal    Neuro/Psych: Orientation:Alert and Orientedx3 ; Mood/Affect:normal       A/P:  R NSW basal cell carcinoma   MOHS:   Location    The rationale for Mohs surgery was discussed with the patient and consent was obtained.  The risks and benefits as well as alternatives to therapy were discussed, in detail.  Specifically, the risks of infection, scarring, bleeding, prolonged wound healing, incomplete removal, allergy to anesthesia, nerve injury and recurrence were addressed.  Indication for Mohs was Location. Prior to the procedure, the treatment site was clearly identified and, if available, confirmed with previous photos and confirmed by the patient   All components of the Universal Protocol/PAUSE rule were completed.  The Mohs surgeon operated in two distinct and integrated capacities as the surgeon and pathologist.      The area was prepped with Betasept.  A rim of normal appearing skin was marked circumferentially around the lesion.  The area was infiltrated with local anesthesia.  The tumor was first debulked to remove all clinically apparent tumor.  An incision following the standard Mohs approach was done and the specimen was oriented,mapped and placed in 2 block(s).  Each specimen was then chromacoded and processed in the Mohs laboratory using standard Mohs technique and submitted for frozen section histology.  Frozen section analysis showed  residual tumor but CLEAR MARGINS.    1st stage:Orthokeratosis of epidermis with a proliferation of nests of basaloid cells, with peripheral palisading and a haphazard arrangement in the center extending into the dermis, forming nodules.  The tumor cells have hyperchromatic nuclei. Poor cytoplasm and intercellular bridging.      The tumor was excised using standard Mohs technique in 2 stages(s).  CLEAR MARGINS OBTAINED and Final  defect size was 1.2cm.     We discussed the options for wound management in full with the patient including risks/benefits/ possible outcomes.      REPAIR COMPLEX: Because of the tightness of the surrounding skin and Because of the size and full thickness nature of the defect, Because of the tightness of the surrounding skin, To maintain form and function, In order to avoid distortion, and Because of the proximity to the nasal tip and ala, a complex closure was planned. After LE anesthesia and prep, Burow's triangles were excised in the relaxed skin tension lines. The wound edges were widely undermined greater than width of the defect on both sides by dissection in the subcutaneous plane until adequate tissue mobility was obtained. Hemostasis was obtained. The wound edges were closed in a layered fashion using Vicryl and Fast Absorbing Plain Gut sutures. Postoperative length was 4 cm.   EBL minimal; complications none; wound care routine.  The patient was discharged in good condition and will return in one week for wound evaluation.  It was a pleasure speaking to Carlos Mayo today.  Previous clinic notes and pertinent laboratory tests were reviewed prior to Carlos Mayo's visit.  Signs and Symptoms of skin cancer discussed with patient.  Patient encouraged to perform monthly skin exams.  UV precautions reviewed with patient.  Return to clinic 6 months        Again, thank you for allowing me to participate in the care of your patient.        Sincerely,        Ray Og MD    Electronically signed

## 2025-05-12 ENCOUNTER — ALLIED HEALTH/NURSE VISIT (OUTPATIENT)
Dept: DERMATOLOGY | Facility: CLINIC | Age: 65
End: 2025-05-12
Payer: MEDICARE

## 2025-05-12 DIAGNOSIS — Z48.01 ENCOUNTER FOR CHANGE OR REMOVAL OF SURGICAL WOUND DRESSING: Primary | ICD-10-CM

## 2025-05-12 PROCEDURE — 99207 PR NO CHARGE NURSE ONLY: CPT

## 2025-05-12 NOTE — PATIENT INSTRUCTIONS
WOUND CARE INSTRUCTIONS  for  ONE WEEK AFTER SURGERY    Leave flat bandage on your skin for one week after today s bandage change.  In one week when you remove the bandage, you may resume your regular skin care routine, including washing with mild soap and water, applying moisturizer, make-up and sunscreen.    If there are any open or bleeding areas at the incision/graft site you should begin to cover the area with a bandage daily as follows:    Clean and dry the area with plain tap water using a Q-tip or sterile gauze pad.  Apply Polysporin or Bacitracin ointment to the open area.  Cover the wound with a band-aid or a sterile non-stick gauze pad and micropore paper tape.         SIGNS OF INFECTION  - If you notice any of these signs of infection, call your doctor right away: expanding redness around the wound.  - Yellow or greenish-colored pus or cloudy wound drainage.    - Red streaking spreading from the wound.  - Increased swelling, tenderness, or pain around the wound.   - Fever.    Please remember that yellow and clear drainage from a wound can be normal and related to normal wound healing.  Isolated drainage from a wound without a combination of the above features does not indicate infection.       *Once the bandages are removed, the scar will be red and firm (especially in the lip/chin area). This is normal and will fade in time. It might take 6-12 months for this to happen.     *Massaging the area will help the scar soften and fade quicker. Begin to massage the area one month after the bandages have been removed. To massage apply pressure directly and firmly over the scar with the fingertips and move in a circular motion. Massage the area for a few minutes several times a day. Continue to massage the site for several months.    *Approximately 6-8 weeks after surgery it is not uncommon to see the formation of  tender pimple-like  bump along the scar. This is normal. As the scar continues to mature and  the stitches underneath the skin begin to dissolve, this might occur. Do not pick or squeeze, this will resolve on it s own. Should one break open producing a small amount of drainage, apply Polysporin or Bacitracin ointment a few times a day until the wound is completely healed.    *Numbness in the surgical area is expected. It might take 12-18 months for the feeling to return to normal. During this time sensations of itchiness, tingling and occasional sharp pains might be noted. These feelings are normal and will subside once the nerves have completely healed.     IN CASE OF EMERGENCY: Dr Og [phone]   If you were seen in [place] call: [phone]  If you were seen in [place] call: [phone]     Proper skin care from [facility]:    -Eliminate harsh soaps as they strip the natural oils from the skin, often resulting in dry itchy skin ( i.e. Dial, Zest, Rebecca Spring)  -Use mild soaps such as Cetaphil or Dove Sensitive Skin in the shower. You do not need to use soap on arms, legs, and trunk every time you shower unless visibly soiled.   -Avoid hot or cold showers.  -After showering, lightly dry off and apply moisturizing within 2-3 minutes. This will help trap moisture in the skin.   -Aggressive use of a moisturizer at least 1-2 times a day to the entire body (including -Vanicream, Cetaphil, Aquaphor or Cerave) and moisturize hands after every washing.  -We recommend using moisturizers that come in a tub that needs to be scooped out, not a pump. This has more of an oil base. It will hold moisture in your skin much better than a water base moisturizer. The above recommended are non-pore clogging.      Wear a sunscreen with at least SPF 30 on your face, ears, neck and V of the chest daily. Wear sunscreen on other areas of the body if those areas are exposed to the sun throughout the day. Sunscreens can contain physical and/or chemical blockers. Physical blockers are less likely to clog pores,  these include zinc oxide and titanium dioxide. Reapply every two hour and after swimming.     Sunscreen examples: https://www.ewg.org/sunscreen/    UV radiation  UVA radiation remains constant throughout the day and throughout the year. It is a longer wavelength than UVB and therefore penetrates deeper into the skin leading to immediate and delayed tanning, photoaging, and skin cancer. 70-80% of UVA and UVB radiation occurs between the hours of 10am-2pm.  UVB radiation  UVB radiation causes the most harmful effects and is more significant during the summer months. However, snow and ice can reflect UVB radiation leading to skin damage during the winter months as well. UVB radiation is responsible for tanning, burning, inflammation, delayed erythema (pinkness), pigmentation (brown spots), and skin cancer.     I recommend self monthly full body exams and yearly full body exams with a dermatology provider. If you develop a new or changing lesion please follow up for examination. Most skin cancers are pink and scaly or pink and pearly. However, we do see blue/brown/black skin cancers.  Consider the ABCDEs of melanoma when giving yourself your monthly full body exam ( don't forget the groin, buttocks, feet, toes, etc). A-asymmetry, B-borders, C-color, D-diameter, E-elevation or evolving. If you see any of these changes please follow up in clinic. If you cannot see your back I recommend purchasing a hand held mirror to use with a larger wall mirror.       Checking for Skin Cancer  You can find cancer early by checking your skin each month. There are 3 kinds of skin cancer. They are melanoma, basal cell carcinoma, and squamous cell carcinoma. Doing monthly skin checks is the best way to find new marks or skin changes. Follow the instructions below for checking your skin.   The ABCDEs of checking moles for melanoma   Check your moles or growths for signs of melanoma using ABCDE:   Asymmetry: the sides of the mole or growth  don t match  Border: the edges are ragged, notched, or blurred  Color: the color within the mole or growth varies  Diameter: the mole or growth is larger than 6 mm (size of a pencil eraser)  Evolving: the size, shape, or color of the mole or growth is changing (evolving is not shown in the images below)    Checking for other types of skin cancer  Basal cell carcinoma or squamous cell carcinoma have symptoms such as:     A spot or mole that looks different from all other marks on your skin  Changes in how an area feels, such as itching, tenderness, or pain  Changes in the skin's surface, such as oozing, bleeding, or scaliness  A sore that does not heal  New swelling or redness beyond the border of a mole    Who s at risk?  Anyone can get skin cancer. But you are at greater risk if you have:   Fair skin, light-colored hair, or light-colored eyes  Many moles or abnormal moles on your skin  A history of sunburns from sunlight or tanning beds  A family history of skin cancer  A history of exposure to radiation or chemicals  A weakened immune system  If you have had skin cancer in the past, you are at risk for recurring skin cancer.   How to check your skin  Do your monthly skin checkups in front of a full-length mirror. Check all parts of your body, including your:   Head (ears, face, neck, and scalp)  Torso (front, back, and sides)  Arms (tops, undersides, upper, and lower armpits)  Hands (palms, backs, and fingers, including under the nails)  Buttocks and genitals  Legs (front, back, and sides)  Feet (tops, soles, toes, including under the nails, and between toes)  If you have a lot of moles, take digital photos of them each month. Make sure to take photos both up close and from a distance. These can help you see if any moles change over time.   Most skin changes are not cancer. But if you see any changes in your skin, call your doctor right away. Only he or she can diagnose a problem. If you have skin cancer, seeing  your doctor can be the first step toward getting the treatment that could save your life.   [employer] last reviewed this educational content on 4/1/2019 2000-2020 [employer]. [address] [place] [address], [place] [address]. All rights reserved. This information is not intended as a substitute for professional medical care. Always follow your healthcare professional's instructions.       When should I call my doctor?  If you are worsening or not improving, please, contact us or seek urgent care as noted below.     Who should I call with questions (adults)?    [facility] [phone]  For urgent needs outside of business hours call the [facility] at [phone] and ask for the dermatology resident on call to be paged  If this is a medical emergency and you are unable to reach an ER, Call [phone]      If you need a prescription refill, please contact your pharmacy. Refills are approved or denied by our Physicians during normal business hours, Monday through Friday.  Per office policy, refills will not be granted if you have not been seen within the past year (or sooner depending on the condition).

## 2025-05-12 NOTE — PROGRESS NOTES
Carlos Mayo comes into clinic today at the request of Dr. Og Ordering Provider for Wound Check Action taken: See Below.    This service provided today was under the supervising provider of the day Dr. Og, who was available if needed.    Pt returned to clinic for post surgery 1 week follow up bandage change. Pt has no complaints, denies pain. Bandage removed from R nasal sidewall, area cleansed with normal saline. Site is healing and wound edges approximating well. Reapplied new steri strips and paper tape.    Advised to watch for signs/sx of infection; spreading redness, drainage, odor, fever. Call or report promptly to clinic. Pt given written instructions and informed to rtc as needed. Patient verbalized understanding.     Jennifer Johnson LPN   5/12/25

## 2025-08-12 ENCOUNTER — OFFICE VISIT (OUTPATIENT)
Dept: DERMATOLOGY | Facility: CLINIC | Age: 65
End: 2025-08-12
Payer: MEDICARE

## 2025-08-12 DIAGNOSIS — Z85.828 HISTORY OF SKIN CANCER: Primary | ICD-10-CM

## 2025-08-12 PROCEDURE — 99212 OFFICE O/P EST SF 10 MIN: CPT | Performed by: DERMATOLOGY

## 2025-09-04 ENCOUNTER — PATIENT OUTREACH (OUTPATIENT)
Dept: CARE COORDINATION | Facility: CLINIC | Age: 65
End: 2025-09-04
Payer: MEDICARE

## (undated) DEVICE — SU ETHIBOND 3-0 RB-1 30" X872H

## (undated) DEVICE — ENDO SNARE EXACTO COLD 9MM LOOP 2.4MMX230CM 00711115

## (undated) DEVICE — SOL WATER IRRIG 1000ML BOTTLE 07139-09

## (undated) DEVICE — SU MONOCRYL 4-0 PS-2 18" UND Y496G

## (undated) DEVICE — PREP CHLORAPREP 26ML TINTED ORANGE  260815

## (undated) DEVICE — DRAPE STERI TOWEL SM 1000

## (undated) DEVICE — DRSG GAUZE 4X4" 3033

## (undated) DEVICE — CAST PADDING 3" STERILE 9043S

## (undated) DEVICE — GLOVE PROTEXIS BLUE W/NEU-THERA 7.0  2D73EB70

## (undated) DEVICE — ENDO FORCEP ENDOJAW BIOPSY 2.8MMX230CM FB-220U

## (undated) DEVICE — DRSG STERI STRIP 1/2X4" R1547

## (undated) DEVICE — PACK HAND WRIST SOP15HWFSP

## (undated) DEVICE — BNDG ELASTIC 2"X5YDS UNSTERILE 6611-20

## (undated) DEVICE — GLOVE PROTEXIS W/NEU-THERA 6.5  2D73TE65

## (undated) DEVICE — SOL ADH LIQUID BENZOIN SWAB 0.6ML C1544